# Patient Record
Sex: FEMALE | Race: WHITE | NOT HISPANIC OR LATINO | Employment: FULL TIME | URBAN - METROPOLITAN AREA
[De-identification: names, ages, dates, MRNs, and addresses within clinical notes are randomized per-mention and may not be internally consistent; named-entity substitution may affect disease eponyms.]

---

## 2017-02-22 ENCOUNTER — APPOINTMENT (OUTPATIENT)
Dept: LAB | Age: 57
End: 2017-02-22
Payer: COMMERCIAL

## 2017-02-22 ENCOUNTER — TRANSCRIBE ORDERS (OUTPATIENT)
Dept: ADMINISTRATIVE | Age: 57
End: 2017-02-22

## 2017-02-22 DIAGNOSIS — Z79.899 ENCOUNTER FOR LONG-TERM (CURRENT) USE OF OTHER MEDICATIONS: Primary | ICD-10-CM

## 2017-02-22 DIAGNOSIS — E55.9 VITAMIN D DEFICIENCY: ICD-10-CM

## 2017-02-22 DIAGNOSIS — Z79.899 ENCOUNTER FOR LONG-TERM (CURRENT) USE OF OTHER MEDICATIONS: ICD-10-CM

## 2017-02-22 DIAGNOSIS — L71.8 OTHER ROSACEA: ICD-10-CM

## 2017-02-22 DIAGNOSIS — R53.83 OTHER FATIGUE: ICD-10-CM

## 2017-02-22 LAB
ALBUMIN SERPL BCP-MCNC: 3.9 G/DL (ref 3.5–5)
ALP SERPL-CCNC: 82 U/L (ref 46–116)
ALT SERPL W P-5'-P-CCNC: 23 U/L (ref 12–78)
ANION GAP SERPL CALCULATED.3IONS-SCNC: 6 MMOL/L (ref 4–13)
AST SERPL W P-5'-P-CCNC: 10 U/L (ref 5–45)
BASOPHILS # BLD AUTO: 0.01 THOUSANDS/ΜL (ref 0–0.1)
BASOPHILS NFR BLD AUTO: 0 % (ref 0–1)
BILIRUB SERPL-MCNC: 0.38 MG/DL (ref 0.2–1)
BUN SERPL-MCNC: 12 MG/DL (ref 5–25)
CALCIUM SERPL-MCNC: 9.4 MG/DL (ref 8.3–10.1)
CHLORIDE SERPL-SCNC: 103 MMOL/L (ref 100–108)
CO2 SERPL-SCNC: 30 MMOL/L (ref 21–32)
CREAT SERPL-MCNC: 0.84 MG/DL (ref 0.6–1.3)
EOSINOPHIL # BLD AUTO: 0.08 THOUSAND/ΜL (ref 0–0.61)
EOSINOPHIL NFR BLD AUTO: 1 % (ref 0–6)
ERYTHROCYTE [DISTWIDTH] IN BLOOD BY AUTOMATED COUNT: 13.5 % (ref 11.6–15.1)
GFR SERPL CREATININE-BSD FRML MDRD: >60 ML/MIN/1.73SQ M
GLUCOSE SERPL-MCNC: 51 MG/DL (ref 65–140)
HCT VFR BLD AUTO: 41.3 % (ref 34.8–46.1)
HGB BLD-MCNC: 13.8 G/DL (ref 11.5–15.4)
LYMPHOCYTES # BLD AUTO: 3 THOUSANDS/ΜL (ref 0.6–4.47)
LYMPHOCYTES NFR BLD AUTO: 48 % (ref 14–44)
MCH RBC QN AUTO: 30.3 PG (ref 26.8–34.3)
MCHC RBC AUTO-ENTMCNC: 33.4 G/DL (ref 31.4–37.4)
MCV RBC AUTO: 91 FL (ref 82–98)
MONOCYTES # BLD AUTO: 0.58 THOUSAND/ΜL (ref 0.17–1.22)
MONOCYTES NFR BLD AUTO: 9 % (ref 4–12)
NEUTROPHILS # BLD AUTO: 2.68 THOUSANDS/ΜL (ref 1.85–7.62)
NEUTS SEG NFR BLD AUTO: 42 % (ref 43–75)
NRBC BLD AUTO-RTO: 0 /100 WBCS
PLATELET # BLD AUTO: 277 THOUSANDS/UL (ref 149–390)
PMV BLD AUTO: 11.6 FL (ref 8.9–12.7)
POTASSIUM SERPL-SCNC: 4 MMOL/L (ref 3.5–5.3)
PROT SERPL-MCNC: 8 G/DL (ref 6.4–8.2)
RBC # BLD AUTO: 4.55 MILLION/UL (ref 3.81–5.12)
SODIUM SERPL-SCNC: 139 MMOL/L (ref 136–145)
WBC # BLD AUTO: 6.36 THOUSAND/UL (ref 4.31–10.16)

## 2017-02-22 PROCEDURE — 36415 COLL VENOUS BLD VENIPUNCTURE: CPT

## 2017-02-22 PROCEDURE — 80053 COMPREHEN METABOLIC PANEL: CPT

## 2017-02-22 PROCEDURE — 85025 COMPLETE CBC W/AUTO DIFF WBC: CPT

## 2017-03-07 ENCOUNTER — ALLSCRIPTS OFFICE VISIT (OUTPATIENT)
Dept: OTHER | Facility: OTHER | Age: 57
End: 2017-03-07

## 2017-03-08 ENCOUNTER — TRANSCRIBE ORDERS (OUTPATIENT)
Dept: ADMINISTRATIVE | Age: 57
End: 2017-03-08

## 2017-03-08 ENCOUNTER — APPOINTMENT (OUTPATIENT)
Dept: LAB | Age: 57
End: 2017-03-08
Payer: COMMERCIAL

## 2017-03-08 DIAGNOSIS — R73.9 HYPERGLYCEMIA: ICD-10-CM

## 2017-03-08 LAB
BASOPHILS # BLD AUTO: 0.02 THOUSANDS/ΜL (ref 0–0.1)
BASOPHILS NFR BLD AUTO: 0 % (ref 0–1)
EOSINOPHIL # BLD AUTO: 0.09 THOUSAND/ΜL (ref 0–0.61)
EOSINOPHIL NFR BLD AUTO: 2 % (ref 0–6)
ERYTHROCYTE [DISTWIDTH] IN BLOOD BY AUTOMATED COUNT: 13.5 % (ref 11.6–15.1)
GLUCOSE P FAST SERPL-MCNC: 77 MG/DL (ref 65–99)
HCT VFR BLD AUTO: 40.5 % (ref 34.8–46.1)
HGB BLD-MCNC: 13.4 G/DL (ref 11.5–15.4)
INSULIN SERPL-ACNC: 9.5 MU/L (ref 3–25)
LYMPHOCYTES # BLD AUTO: 2.22 THOUSANDS/ΜL (ref 0.6–4.47)
LYMPHOCYTES NFR BLD AUTO: 37 % (ref 14–44)
MCH RBC QN AUTO: 30 PG (ref 26.8–34.3)
MCHC RBC AUTO-ENTMCNC: 33.1 G/DL (ref 31.4–37.4)
MCV RBC AUTO: 91 FL (ref 82–98)
MONOCYTES # BLD AUTO: 0.81 THOUSAND/ΜL (ref 0.17–1.22)
MONOCYTES NFR BLD AUTO: 13 % (ref 4–12)
NEUTROPHILS # BLD AUTO: 2.93 THOUSANDS/ΜL (ref 1.85–7.62)
NEUTS SEG NFR BLD AUTO: 48 % (ref 43–75)
NRBC BLD AUTO-RTO: 0 /100 WBCS
PLATELET # BLD AUTO: 237 THOUSANDS/UL (ref 149–390)
PMV BLD AUTO: 11.7 FL (ref 8.9–12.7)
RBC # BLD AUTO: 4.47 MILLION/UL (ref 3.81–5.12)
WBC # BLD AUTO: 6.07 THOUSAND/UL (ref 4.31–10.16)

## 2017-03-08 PROCEDURE — 85025 COMPLETE CBC W/AUTO DIFF WBC: CPT

## 2017-03-08 PROCEDURE — 82947 ASSAY GLUCOSE BLOOD QUANT: CPT

## 2017-03-08 PROCEDURE — 36415 COLL VENOUS BLD VENIPUNCTURE: CPT

## 2017-03-08 PROCEDURE — 83525 ASSAY OF INSULIN: CPT

## 2017-03-16 ENCOUNTER — HOSPITAL ENCOUNTER (OUTPATIENT)
Dept: NON INVASIVE DIAGNOSTICS | Facility: CLINIC | Age: 57
Discharge: HOME | End: 2017-03-16
Payer: COMMERCIAL

## 2017-03-16 DIAGNOSIS — R07.9 CHEST PAIN: ICD-10-CM

## 2017-03-16 LAB
CHEST PAIN STATEMENT: NORMAL
MAX DIASTOLIC BP: 68 MMHG
MAX HEART RATE: 164 BPM
MAX PREDICTED HEART RATE: 162 BPM
MAX. SYSTOLIC BP: 152 MMHG
PROTOCOL NAME: NORMAL
REASON FOR TERMINATION: NORMAL
TARGET HR FORMULA: NORMAL
TEST INDICATION: NORMAL
TIME IN EXERCISE PHASE: 570 S

## 2017-03-16 PROCEDURE — 93017 CV STRESS TEST TRACING ONLY: CPT

## 2017-05-22 ENCOUNTER — ALLSCRIPTS OFFICE VISIT (OUTPATIENT)
Dept: OTHER | Facility: OTHER | Age: 57
End: 2017-05-22

## 2017-05-23 ENCOUNTER — GENERIC CONVERSION - ENCOUNTER (OUTPATIENT)
Dept: OTHER | Facility: OTHER | Age: 57
End: 2017-05-23

## 2017-05-30 ENCOUNTER — GENERIC CONVERSION - ENCOUNTER (OUTPATIENT)
Dept: OTHER | Facility: OTHER | Age: 57
End: 2017-05-30

## 2017-06-23 ENCOUNTER — GENERIC CONVERSION - ENCOUNTER (OUTPATIENT)
Dept: OTHER | Facility: OTHER | Age: 57
End: 2017-06-23

## 2017-06-30 ENCOUNTER — GENERIC CONVERSION - ENCOUNTER (OUTPATIENT)
Dept: OTHER | Facility: OTHER | Age: 57
End: 2017-06-30

## 2017-06-30 ENCOUNTER — APPOINTMENT (OUTPATIENT)
Dept: LAB | Age: 57
End: 2017-06-30
Payer: COMMERCIAL

## 2017-06-30 ENCOUNTER — TRANSCRIBE ORDERS (OUTPATIENT)
Dept: ADMINISTRATIVE | Age: 57
End: 2017-06-30

## 2017-06-30 DIAGNOSIS — Z00.8 HEALTH EXAMINATION IN POPULATION SURVEY: ICD-10-CM

## 2017-06-30 DIAGNOSIS — Z00.8 HEALTH EXAMINATION IN POPULATION SURVEY: Primary | ICD-10-CM

## 2017-06-30 LAB
CHOLEST SERPL-MCNC: 161 MG/DL (ref 50–200)
EST. AVERAGE GLUCOSE BLD GHB EST-MCNC: 108 MG/DL
HBA1C MFR BLD: 5.4 % (ref 4.2–6.3)
HDLC SERPL-MCNC: 65 MG/DL (ref 40–60)
LDLC SERPL CALC-MCNC: 83 MG/DL (ref 0–100)
TRIGL SERPL-MCNC: 64 MG/DL

## 2017-06-30 PROCEDURE — 36415 COLL VENOUS BLD VENIPUNCTURE: CPT

## 2017-06-30 PROCEDURE — 83036 HEMOGLOBIN GLYCOSYLATED A1C: CPT

## 2017-06-30 PROCEDURE — 80061 LIPID PANEL: CPT

## 2017-07-14 RX ORDER — CHOLECALCIFEROL (VITAMIN D3) 125 MCG
CAPSULE ORAL DAILY
COMMUNITY
End: 2018-04-24 | Stop reason: SDUPTHER

## 2017-07-14 RX ORDER — SPIRONOLACTONE 50 MG/1
TABLET, FILM COATED ORAL
COMMUNITY
End: 2018-10-31 | Stop reason: SDUPTHER

## 2017-07-16 ENCOUNTER — ANESTHESIA EVENT (OUTPATIENT)
Dept: GASTROENTEROLOGY | Facility: HOSPITAL | Age: 57
End: 2017-07-16
Payer: COMMERCIAL

## 2017-07-17 ENCOUNTER — HOSPITAL ENCOUNTER (OUTPATIENT)
Facility: HOSPITAL | Age: 57
Setting detail: OUTPATIENT SURGERY
Discharge: HOME | End: 2017-07-17
Attending: COLON & RECTAL SURGERY | Admitting: COLON & RECTAL SURGERY
Payer: COMMERCIAL

## 2017-07-17 ENCOUNTER — GENERIC CONVERSION - ENCOUNTER (OUTPATIENT)
Dept: OTHER | Facility: OTHER | Age: 57
End: 2017-07-17

## 2017-07-17 ENCOUNTER — ANESTHESIA (OUTPATIENT)
Dept: GASTROENTEROLOGY | Facility: HOSPITAL | Age: 57
End: 2017-07-17
Payer: COMMERCIAL

## 2017-07-17 VITALS
TEMPERATURE: 97.1 F | WEIGHT: 145 LBS | BODY MASS INDEX: 24.75 KG/M2 | RESPIRATION RATE: 16 BRPM | DIASTOLIC BLOOD PRESSURE: 65 MMHG | HEIGHT: 64 IN | SYSTOLIC BLOOD PRESSURE: 93 MMHG | OXYGEN SATURATION: 100 % | HEART RATE: 70 BPM

## 2017-07-17 DIAGNOSIS — Z83.79 FAMILY HISTORY OF OTHER DISEASES OF THE DIGESTIVE SYSTEM: ICD-10-CM

## 2017-07-17 DIAGNOSIS — Z12.11 ENCOUNTER FOR SCREENING FOR MALIGNANT NEOPLASM OF COLON: ICD-10-CM

## 2017-07-17 PROCEDURE — 88305 TISSUE EXAM BY PATHOLOGIST: CPT | Performed by: COLON & RECTAL SURGERY

## 2017-07-17 RX ORDER — PROPOFOL 10 MG/ML
INJECTION, EMULSION INTRAVENOUS CONTINUOUS PRN
Status: DISCONTINUED | OUTPATIENT
Start: 2017-07-17 | End: 2017-07-17 | Stop reason: SURG

## 2017-07-17 RX ORDER — SODIUM CHLORIDE 9 MG/ML
100 INJECTION, SOLUTION INTRAVENOUS CONTINUOUS
Status: DISCONTINUED | OUTPATIENT
Start: 2017-07-17 | End: 2017-07-17 | Stop reason: HOSPADM

## 2017-07-17 RX ORDER — PROPOFOL 10 MG/ML
INJECTION, EMULSION INTRAVENOUS AS NEEDED
Status: DISCONTINUED | OUTPATIENT
Start: 2017-07-17 | End: 2017-07-17 | Stop reason: SURG

## 2017-07-17 RX ADMIN — PROPOFOL 110 MCG/KG/MIN: 10 INJECTION, EMULSION INTRAVENOUS at 07:44

## 2017-07-17 RX ADMIN — SODIUM CHLORIDE 100 ML/HR: 0.9 INJECTION, SOLUTION INTRAVENOUS at 07:30

## 2017-07-17 RX ADMIN — PROPOFOL 90 MG: 10 INJECTION, EMULSION INTRAVENOUS at 07:44

## 2017-07-31 ENCOUNTER — ALLSCRIPTS OFFICE VISIT (OUTPATIENT)
Dept: OTHER | Facility: OTHER | Age: 57
End: 2017-07-31

## 2017-07-31 DIAGNOSIS — Z12.31 ENCOUNTER FOR SCREENING MAMMOGRAM FOR MALIGNANT NEOPLASM OF BREAST: ICD-10-CM

## 2017-07-31 PROCEDURE — G0145 SCR C/V CYTO,THINLAYER,RESCR: HCPCS | Performed by: OBSTETRICS & GYNECOLOGY

## 2017-08-02 ENCOUNTER — TRANSCRIBE ORDERS (OUTPATIENT)
Dept: ADMINISTRATIVE | Age: 57
End: 2017-08-02

## 2017-08-02 DIAGNOSIS — E78.5 OTHER AND UNSPECIFIED HYPERLIPIDEMIA: Primary | ICD-10-CM

## 2017-08-03 ENCOUNTER — LAB REQUISITION (OUTPATIENT)
Dept: LAB | Facility: HOSPITAL | Age: 57
End: 2017-08-03
Payer: COMMERCIAL

## 2017-08-03 DIAGNOSIS — Z01.419 ENCOUNTER FOR GYNECOLOGICAL EXAMINATION WITHOUT ABNORMAL FINDING: ICD-10-CM

## 2017-08-08 LAB
LAB AP GYN PRIMARY INTERPRETATION: NORMAL
Lab: NORMAL

## 2017-09-19 ENCOUNTER — GENERIC CONVERSION - ENCOUNTER (OUTPATIENT)
Dept: OTHER | Facility: OTHER | Age: 57
End: 2017-09-19

## 2017-10-16 ENCOUNTER — TRANSCRIBE ORDERS (OUTPATIENT)
Dept: ADMINISTRATIVE | Age: 57
End: 2017-10-16

## 2017-10-16 DIAGNOSIS — Z12.31 ENCOUNTER FOR SCREENING MAMMOGRAM FOR MALIGNANT NEOPLASM OF BREAST: ICD-10-CM

## 2017-10-16 DIAGNOSIS — E78.5 HYPERLIPIDEMIA: ICD-10-CM

## 2017-10-16 DIAGNOSIS — M85.80 OTHER SPECIFIED DISORDERS OF BONE DENSITY AND STRUCTURE, UNSPECIFIED SITE (CODE): ICD-10-CM

## 2017-10-16 DIAGNOSIS — E55.9 VITAMIN D DEFICIENCY: ICD-10-CM

## 2017-10-16 DIAGNOSIS — R92.2 INCONCLUSIVE MAMMOGRAM: ICD-10-CM

## 2017-10-16 DIAGNOSIS — M89.9 DISORDER OF BONE: ICD-10-CM

## 2017-10-20 ENCOUNTER — APPOINTMENT (OUTPATIENT)
Dept: LAB | Age: 57
End: 2017-10-20
Payer: COMMERCIAL

## 2017-10-20 ENCOUNTER — TRANSCRIBE ORDERS (OUTPATIENT)
Dept: ADMINISTRATIVE | Age: 57
End: 2017-10-20

## 2017-10-20 DIAGNOSIS — E55.9 VITAMIN D DEFICIENCY: ICD-10-CM

## 2017-10-20 DIAGNOSIS — E78.5 HYPERLIPIDEMIA: ICD-10-CM

## 2017-10-20 LAB
25(OH)D3 SERPL-MCNC: 38.3 NG/ML (ref 30–100)
ALBUMIN SERPL BCP-MCNC: 3.8 G/DL (ref 3.5–5)
ALP SERPL-CCNC: 91 U/L (ref 46–116)
ALT SERPL W P-5'-P-CCNC: 19 U/L (ref 12–78)
ANION GAP SERPL CALCULATED.3IONS-SCNC: 3 MMOL/L (ref 4–13)
AST SERPL W P-5'-P-CCNC: 13 U/L (ref 5–45)
BASOPHILS # BLD AUTO: 0.03 THOUSANDS/ΜL (ref 0–0.1)
BASOPHILS NFR BLD AUTO: 1 % (ref 0–1)
BILIRUB SERPL-MCNC: 0.5 MG/DL (ref 0.2–1)
BUN SERPL-MCNC: 12 MG/DL (ref 5–25)
CALCIUM SERPL-MCNC: 9.6 MG/DL (ref 8.3–10.1)
CHLORIDE SERPL-SCNC: 102 MMOL/L (ref 100–108)
CO2 SERPL-SCNC: 29 MMOL/L (ref 21–32)
CREAT SERPL-MCNC: 0.76 MG/DL (ref 0.6–1.3)
EOSINOPHIL # BLD AUTO: 0.12 THOUSAND/ΜL (ref 0–0.61)
EOSINOPHIL NFR BLD AUTO: 2 % (ref 0–6)
ERYTHROCYTE [DISTWIDTH] IN BLOOD BY AUTOMATED COUNT: 12.7 % (ref 11.6–15.1)
GFR SERPL CREATININE-BSD FRML MDRD: 87 ML/MIN/1.73SQ M
GLUCOSE P FAST SERPL-MCNC: 76 MG/DL (ref 65–99)
HCT VFR BLD AUTO: 42.1 % (ref 34.8–46.1)
HDLC SERPL-MCNC: 66 MG/DL (ref 40–60)
HGB BLD-MCNC: 14.3 G/DL (ref 11.5–15.4)
LDLC SERPL DIRECT ASSAY-MCNC: 93 MG/DL (ref 0–100)
LYMPHOCYTES # BLD AUTO: 2.05 THOUSANDS/ΜL (ref 0.6–4.47)
LYMPHOCYTES NFR BLD AUTO: 33 % (ref 14–44)
MCH RBC QN AUTO: 31 PG (ref 26.8–34.3)
MCHC RBC AUTO-ENTMCNC: 34 G/DL (ref 31.4–37.4)
MCV RBC AUTO: 91 FL (ref 82–98)
MONOCYTES # BLD AUTO: 0.82 THOUSAND/ΜL (ref 0.17–1.22)
MONOCYTES NFR BLD AUTO: 13 % (ref 4–12)
NEUTROPHILS # BLD AUTO: 3.09 THOUSANDS/ΜL (ref 1.85–7.62)
NEUTS SEG NFR BLD AUTO: 51 % (ref 43–75)
NRBC BLD AUTO-RTO: 0 /100 WBCS
PLATELET # BLD AUTO: 298 THOUSANDS/UL (ref 149–390)
PMV BLD AUTO: 11.3 FL (ref 8.9–12.7)
POTASSIUM SERPL-SCNC: 3.9 MMOL/L (ref 3.5–5.3)
PROT SERPL-MCNC: 8 G/DL (ref 6.4–8.2)
RBC # BLD AUTO: 4.61 MILLION/UL (ref 3.81–5.12)
SODIUM SERPL-SCNC: 134 MMOL/L (ref 136–145)
TRIGL SERPL-MCNC: 96 MG/DL
WBC # BLD AUTO: 6.13 THOUSAND/UL (ref 4.31–10.16)

## 2017-10-20 PROCEDURE — 80053 COMPREHEN METABOLIC PANEL: CPT

## 2017-10-20 PROCEDURE — 83718 ASSAY OF LIPOPROTEIN: CPT

## 2017-10-20 PROCEDURE — 83721 ASSAY OF BLOOD LIPOPROTEIN: CPT

## 2017-10-20 PROCEDURE — 85025 COMPLETE CBC W/AUTO DIFF WBC: CPT

## 2017-10-20 PROCEDURE — 36415 COLL VENOUS BLD VENIPUNCTURE: CPT

## 2017-10-20 PROCEDURE — 82306 VITAMIN D 25 HYDROXY: CPT

## 2017-10-20 PROCEDURE — 84478 ASSAY OF TRIGLYCERIDES: CPT

## 2017-10-23 ENCOUNTER — GENERIC CONVERSION - ENCOUNTER (OUTPATIENT)
Dept: OTHER | Facility: OTHER | Age: 57
End: 2017-10-23

## 2017-10-23 ENCOUNTER — GENERIC CONVERSION - ENCOUNTER (OUTPATIENT)
Dept: INTERNAL MEDICINE CLINIC | Facility: CLINIC | Age: 57
End: 2017-10-23

## 2018-01-13 VITALS
RESPIRATION RATE: 14 BRPM | WEIGHT: 143 LBS | BODY MASS INDEX: 23.82 KG/M2 | SYSTOLIC BLOOD PRESSURE: 118 MMHG | HEART RATE: 68 BPM | DIASTOLIC BLOOD PRESSURE: 70 MMHG | HEIGHT: 65 IN

## 2018-01-13 VITALS
HEIGHT: 65 IN | DIASTOLIC BLOOD PRESSURE: 76 MMHG | BODY MASS INDEX: 23.41 KG/M2 | SYSTOLIC BLOOD PRESSURE: 120 MMHG | WEIGHT: 140.5 LBS

## 2018-01-14 NOTE — MISCELLANEOUS
Message  Spoke with patient regarding genetic testing  Negative for any mutations  Patient flagged for increased colorectal screening secondary to family history  Suggest colonoscopy screening every 5 years  Patient to discuss with her provider, and to ask at what age her children should start screening  Folder with results to be mailed to patient        Signatures   Electronically signed by : Raghav David, [facility]; Jun 16 2016  2:18PM EST                       (Author)

## 2018-01-15 VITALS
TEMPERATURE: 98.4 F | HEART RATE: 68 BPM | BODY MASS INDEX: 23.89 KG/M2 | WEIGHT: 143.38 LBS | HEIGHT: 65 IN | RESPIRATION RATE: 14 BRPM | SYSTOLIC BLOOD PRESSURE: 116 MMHG | DIASTOLIC BLOOD PRESSURE: 72 MMHG

## 2018-01-22 VITALS — BODY MASS INDEX: 24.18 KG/M2 | WEIGHT: 145.13 LBS | HEIGHT: 65 IN

## 2018-01-22 VITALS — RESPIRATION RATE: 14 BRPM | HEART RATE: 68 BPM

## 2018-01-30 ENCOUNTER — TRANSCRIBE ORDERS (OUTPATIENT)
Dept: ADMINISTRATIVE | Age: 58
End: 2018-01-30

## 2018-01-31 ENCOUNTER — HOSPITAL ENCOUNTER (OUTPATIENT)
Dept: RADIOLOGY | Age: 58
Discharge: HOME | End: 2018-01-31
Payer: COMMERCIAL

## 2018-01-31 DIAGNOSIS — R92.2 INCONCLUSIVE MAMMOGRAM: ICD-10-CM

## 2018-01-31 DIAGNOSIS — Z12.31 ENCOUNTER FOR SCREENING MAMMOGRAM FOR MALIGNANT NEOPLASM OF BREAST: ICD-10-CM

## 2018-01-31 PROCEDURE — 77063 BREAST TOMOSYNTHESIS BI: CPT

## 2018-01-31 PROCEDURE — 77067 SCR MAMMO BI INCL CAD: CPT

## 2018-04-01 DIAGNOSIS — E78.5 HYPERLIPIDEMIA: ICD-10-CM

## 2018-04-01 DIAGNOSIS — R07.9 CHEST PAIN: ICD-10-CM

## 2018-04-12 ENCOUNTER — APPOINTMENT (OUTPATIENT)
Dept: LAB | Facility: MEDICAL CENTER | Age: 58
End: 2018-04-12
Payer: COMMERCIAL

## 2018-04-12 DIAGNOSIS — R07.9 CHEST PAIN: ICD-10-CM

## 2018-04-12 DIAGNOSIS — E78.5 HYPERLIPIDEMIA: ICD-10-CM

## 2018-04-12 LAB
ALBUMIN SERPL BCP-MCNC: 3.9 G/DL (ref 3.5–5)
ALP SERPL-CCNC: 88 U/L (ref 46–116)
ALT SERPL W P-5'-P-CCNC: 20 U/L (ref 12–78)
ANION GAP SERPL CALCULATED.3IONS-SCNC: 5 MMOL/L (ref 4–13)
AST SERPL W P-5'-P-CCNC: 16 U/L (ref 5–45)
BASOPHILS # BLD AUTO: 0.03 THOUSANDS/ΜL (ref 0–0.1)
BASOPHILS NFR BLD AUTO: 1 % (ref 0–1)
BILIRUB SERPL-MCNC: 0.3 MG/DL (ref 0.2–1)
BUN SERPL-MCNC: 16 MG/DL (ref 5–25)
CALCIUM SERPL-MCNC: 9.6 MG/DL
CHLORIDE SERPL-SCNC: 106 MMOL/L (ref 100–108)
CO2 SERPL-SCNC: 27 MMOL/L (ref 21–32)
CREAT SERPL-MCNC: 0.83 MG/DL (ref 0.6–1.3)
EOSINOPHIL # BLD AUTO: 0.13 THOUSAND/ΜL (ref 0–0.61)
EOSINOPHIL NFR BLD AUTO: 2 % (ref 0–6)
ERYTHROCYTE [DISTWIDTH] IN BLOOD BY AUTOMATED COUNT: 12.5 % (ref 11.6–15.1)
GFR SERPL CREATININE-BSD FRML MDRD: 77 ML/MIN/1.73SQ M
GLUCOSE SERPL-MCNC: 62 MG/DL (ref 65–140)
HCT VFR BLD AUTO: 45.1 % (ref 34.8–46.1)
HCV AB SER QL: NORMAL
HGB BLD-MCNC: 14.5 G/DL (ref 11.5–15.4)
LYMPHOCYTES # BLD AUTO: 1.9 THOUSANDS/ΜL (ref 0.6–4.47)
LYMPHOCYTES NFR BLD AUTO: 35 % (ref 14–44)
MCH RBC QN AUTO: 30.3 PG (ref 26.8–34.3)
MCHC RBC AUTO-ENTMCNC: 32.2 G/DL (ref 31.4–37.4)
MCV RBC AUTO: 94 FL (ref 82–98)
MONOCYTES # BLD AUTO: 0.65 THOUSAND/ΜL (ref 0.17–1.22)
MONOCYTES NFR BLD AUTO: 12 % (ref 4–12)
NEUTROPHILS # BLD AUTO: 2.78 THOUSANDS/ΜL (ref 1.85–7.62)
NEUTS SEG NFR BLD AUTO: 50 % (ref 43–75)
NRBC BLD AUTO-RTO: 0 /100 WBCS
PLATELET # BLD AUTO: 267 THOUSANDS/UL (ref 149–390)
PMV BLD AUTO: 11.5 FL (ref 8.9–12.7)
POTASSIUM SERPL-SCNC: 4.5 MMOL/L (ref 3.5–5.3)
PROT SERPL-MCNC: 7.9 G/DL (ref 6.4–8.2)
RBC # BLD AUTO: 4.79 MILLION/UL (ref 3.81–5.12)
SODIUM SERPL-SCNC: 138 MMOL/L (ref 136–145)
WBC # BLD AUTO: 5.5 THOUSAND/UL (ref 4.31–10.16)

## 2018-04-12 PROCEDURE — 85025 COMPLETE CBC W/AUTO DIFF WBC: CPT

## 2018-04-12 PROCEDURE — 36415 COLL VENOUS BLD VENIPUNCTURE: CPT

## 2018-04-12 PROCEDURE — 80053 COMPREHEN METABOLIC PANEL: CPT

## 2018-04-12 PROCEDURE — 86803 HEPATITIS C AB TEST: CPT

## 2018-04-22 PROBLEM — M85.80 OSTEOPENIA: Status: ACTIVE | Noted: 2017-10-23

## 2018-04-22 PROBLEM — H33.20 RETINAL DETACHMENT: Status: ACTIVE | Noted: 2017-10-23

## 2018-04-22 PROBLEM — R73.9 HYPERGLYCEMIA: Status: ACTIVE | Noted: 2017-03-07

## 2018-04-22 PROBLEM — M85.80 OSTEOPENIA: Chronic | Status: ACTIVE | Noted: 2017-10-23

## 2018-04-22 PROBLEM — H33.20 RETINAL DETACHMENT: Chronic | Status: ACTIVE | Noted: 2017-10-23

## 2018-04-22 PROBLEM — K63.5 COLON POLYPS: Status: ACTIVE | Noted: 2017-10-23

## 2018-04-22 PROBLEM — K63.5 COLON POLYPS: Chronic | Status: ACTIVE | Noted: 2017-10-23

## 2018-04-23 RX ORDER — NITROFURANTOIN 25; 75 MG/1; MG/1
1 CAPSULE ORAL EVERY 12 HOURS
COMMUNITY
Start: 2017-10-14 | End: 2018-04-24 | Stop reason: SDUPTHER

## 2018-04-23 RX ORDER — FLUTICASONE PROPIONATE 50 MCG
SPRAY, SUSPENSION (ML) NASAL
COMMUNITY
End: 2018-10-31 | Stop reason: SDUPTHER

## 2018-04-23 RX ORDER — ERGOCALCIFEROL 1.25 MG/1
CAPSULE ORAL
COMMUNITY
Start: 2017-05-09 | End: 2018-10-31 | Stop reason: SDUPTHER

## 2018-04-24 ENCOUNTER — OFFICE VISIT (OUTPATIENT)
Dept: INTERNAL MEDICINE CLINIC | Facility: CLINIC | Age: 58
End: 2018-04-24
Payer: COMMERCIAL

## 2018-04-24 VITALS
DIASTOLIC BLOOD PRESSURE: 72 MMHG | SYSTOLIC BLOOD PRESSURE: 110 MMHG | WEIGHT: 144.6 LBS | HEART RATE: 78 BPM | HEIGHT: 64 IN | BODY MASS INDEX: 24.69 KG/M2 | RESPIRATION RATE: 14 BRPM

## 2018-04-24 DIAGNOSIS — F41.9 ANXIETY: ICD-10-CM

## 2018-04-24 DIAGNOSIS — R07.9 CHEST PAIN, UNSPECIFIED TYPE: ICD-10-CM

## 2018-04-24 DIAGNOSIS — E55.9 VITAMIN D DEFICIENCY: ICD-10-CM

## 2018-04-24 DIAGNOSIS — J45.20 INTERMITTENT ASTHMA WITHOUT COMPLICATION, UNSPECIFIED ASTHMA SEVERITY: Chronic | ICD-10-CM

## 2018-04-24 DIAGNOSIS — E78.5 HYPERLIPIDEMIA, UNSPECIFIED HYPERLIPIDEMIA TYPE: Primary | Chronic | ICD-10-CM

## 2018-04-24 PROCEDURE — 99214 OFFICE O/P EST MOD 30 MIN: CPT | Performed by: INTERNAL MEDICINE

## 2018-04-24 PROCEDURE — 3008F BODY MASS INDEX DOCD: CPT | Performed by: INTERNAL MEDICINE

## 2018-04-24 RX ORDER — DOXYCYCLINE HYCLATE 50 MG/1
CAPSULE ORAL AS NEEDED
COMMUNITY
Start: 2018-04-06 | End: 2018-10-31 | Stop reason: SDUPTHER

## 2018-04-24 RX ORDER — CLINDAMYCIN PHOSPHATE 10 MG/ML
SOLUTION TOPICAL
COMMUNITY
Start: 2018-03-09 | End: 2018-10-31 | Stop reason: SDUPTHER

## 2018-04-24 NOTE — PROGRESS NOTES
Assessment/Plan:  1  Rosacea-treatment as per Dermatology-currently on oral doxycycline and topical clindamycin  2  Family history cancer of the colon with personal history of polyps  Colonoscopy in July 2017 showed polyps  Recommended repeat in 5 years which is in the summer 2022  3  Retinal tear-continues to recover from this  4  Dense breast-family history of breast CA as well  As noted subsequent mammograms are being done with both 2D and 3D studies  5  Prior concerned about possible hypoglycemia  Simultaneous sugar 77 with fasting insulin normal   Previously she had identified sugar 51 without definite associated symptoms-no longer an issue  6  Vitamin-D deficiency-remains on over-the-counter as well as prescription vitamin-D  7   Epistaxis-previously attributed to Claritin-D  Not an issue at present uses saline nasal spray  8  Strong family history of breast cancer-mother premenopausal breast CA  Grandmother and had premenopausal breast CA  No family history of ovarian  Also has family history cancer of the colon  Had been previously recommended the patient she had periodic MRI of the breast as well as genetic testing  She had genetic testing was told results were negative  9  Anxiety improved-monitor  10  Atypical chest pain-may represent costochondritis  Has not recurredoto  11  Dizziness-genic-saw Dr Nuno Lawler spent in the past   At 1 point was being considered for possible Meniere's disease  12  Acne-had seen Dermatology  Remains on spironolactone  Prior androgen levels including testosterone as well as DHEA normal  13  Recurrent UTI-had seen Dr [place]  Cystoscopy showed inflammatory bladder neck polyps with squamous metaplasia and Tricor and I this  IVP the remote past normal   Previously was on Macrodantin suppression    Asymptomatic times several years    All other problems as per note of September 2011      MEDICAL REGIMEN:      Spironolactone 50 milligrams -2 p o  b i d , multivitamin, vitamin D3/2000 units a day, vitamin-D 96816 units monthly    Appointment in several months with prior chemistry profile, cholesterol profile, vitamin-D level  No problem-specific Assessment & Plan notes found for this encounter  Diagnoses and all orders for this visit:    Hyperlipidemia, unspecified hyperlipidemia type    Chest pain, unspecified type    Anxiety    Intermittent asthma without complication, unspecified asthma severity    Vitamin D deficiency    Other orders  -     DAILY MULTIPLE VITAMINS PO; Take by mouth  -     fluticasone (FLONASE) 50 mcg/act nasal spray; into each nostril  -     Discontinue: nitrofurantoin (MACROBID) 100 mg capsule; Take 1 capsule by mouth every 12 (twelve) hours  -     ergocalciferol (VITAMIN D2) 50,000 units; Take by mouth  -     doxycycline hyclate (VIBRAMYCIN) 50 mg capsule;   -     clindamycin (CLEOCIN T) 1 %; Subjective:      Patient ID: Lisa Koo is a 60 y o  female  Reviewed several issues  As noted she is an extremely strong family history of breast cancer-perhaps on identified familial breast cancer syndrome  She also has dense breasts  We reviewed the literature showing higher incidence of breast cancer in patients with dense breasts  She needs 2D as well as 3D studies when she has her mammogram   Most recent mammogram was done in January 2018 was a 2D with 3D study  Exam today shows no evidence of any masses  She had traveled to [place]  She returned and had facial rash  She saw Dermatology who told her this was a rosacea varying  She is currently on doxycycline 50 milligrams daily for month with clindamycin topical b i d  She says her face is much improved  This had not happened to her previously  This patient denies any systemic symptoms  Specifically there has been no evidence of fever, night sweats, significant weight loss or significant decrease in appetite            As noted she had a prior retinal tear   Treatment as per Ophthalmology  She is recovering from this  As a follow-up visit with Ophthalmology scheduled  Laboratory testing done prior to this visit shows normal CBC and chemistry profile  As noted she remains on spironolactone currently on 200 milligrams a day using 50 milligram dosing  She has not had any recurrence the atypical chest pain  She has an active individual             The following portions of the patient's history were reviewed and updated as appropriate: current medications, past family history, past medical history, past social history, past surgical history and problem list     Review of Systems   Constitutional: Negative  Respiratory: Negative  Cardiovascular: Negative  Gastrointestinal: Negative  Endocrine: Negative  Genitourinary: Negative  Musculoskeletal: Negative  Skin: Positive for rash  Neurological: Negative  Hematological: Negative  Psychiatric/Behavioral: Negative  Objective:      /72   Pulse 78   Resp 14   Ht 5' 4" (1 626 m)   Wt 65 6 kg (144 lb 9 6 oz)   BMI 24 82 kg/m²          Physical Exam   Constitutional: She is oriented to person, place, and time  She appears well-developed and well-nourished  No distress  HENT:   Head: Normocephalic and atraumatic  Right Ear: External ear normal    Left Ear: External ear normal    Nose: Nose normal    Mouth/Throat: Oropharynx is clear and moist  No oropharyngeal exudate  Eyes: Conjunctivae and EOM are normal  Pupils are equal, round, and reactive to light  Right eye exhibits no discharge  Left eye exhibits no discharge  No scleral icterus  Neck: Normal range of motion  Neck supple  No JVD present  No tracheal deviation present  No thyromegaly present  Cardiovascular: Normal rate, regular rhythm and intact distal pulses  Exam reveals no gallop and no friction rub  No murmur heard    Pulmonary/Chest: Effort normal and breath sounds normal  No respiratory distress  She has no wheezes  She has no rales  She exhibits no tenderness  Abdominal: Soft  Bowel sounds are normal  She exhibits no distension and no mass  There is no tenderness  There is no rebound and no guarding  Musculoskeletal: Normal range of motion  She exhibits no edema or deformity  Lymphadenopathy:     She has no cervical adenopathy  Neurological: She is alert and oriented to person, place, and time  She has normal reflexes  No cranial nerve deficit  She exhibits normal muscle tone  Coordination normal    Skin: Skin is warm and dry  No rash noted  No erythema  Psychiatric: She has a normal mood and affect  Her behavior is normal  Judgment and thought content normal    Vitals reviewed

## 2018-06-05 ENCOUNTER — OFFICE VISIT (OUTPATIENT)
Dept: URGENT CARE | Age: 58
End: 2018-06-05
Payer: COMMERCIAL

## 2018-06-05 VITALS
OXYGEN SATURATION: 98 % | BODY MASS INDEX: 24.24 KG/M2 | SYSTOLIC BLOOD PRESSURE: 98 MMHG | WEIGHT: 142 LBS | HEART RATE: 79 BPM | RESPIRATION RATE: 18 BRPM | TEMPERATURE: 97.7 F | HEIGHT: 64 IN | DIASTOLIC BLOOD PRESSURE: 56 MMHG

## 2018-06-05 DIAGNOSIS — L23.7 POISON IVY DERMATITIS: Primary | ICD-10-CM

## 2018-06-05 PROCEDURE — 99213 OFFICE O/P EST LOW 20 MIN: CPT | Performed by: FAMILY MEDICINE

## 2018-06-05 PROCEDURE — S9088 SERVICES PROVIDED IN URGENT: HCPCS | Performed by: FAMILY MEDICINE

## 2018-06-05 RX ORDER — METHYLPREDNISOLONE SODIUM SUCCINATE 125 MG/2ML
125 INJECTION, POWDER, LYOPHILIZED, FOR SOLUTION INTRAMUSCULAR; INTRAVENOUS ONCE
Status: COMPLETED | OUTPATIENT
Start: 2018-06-05 | End: 2018-06-05

## 2018-06-05 RX ORDER — PREDNISONE 10 MG/1
TABLET ORAL
Qty: 21 TABLET | Refills: 0 | Status: SHIPPED | COMMUNITY
Start: 2018-06-05 | End: 2018-10-31 | Stop reason: ALTCHOICE

## 2018-06-05 RX ADMIN — METHYLPREDNISOLONE SODIUM SUCCINATE 125 MG: 125 INJECTION, POWDER, LYOPHILIZED, FOR SOLUTION INTRAMUSCULAR; INTRAVENOUS at 08:29

## 2018-06-05 NOTE — PROGRESS NOTES
[facility]        NAME: Hema Silvestre is a 60 y o  female  : 1960    MRN: [id number]  DATE: 2018  TIME: 8:30 AM    Assessment and Plan   Poison ivy dermatitis [L23 7]  1  Poison ivy dermatitis  methylPREDNISolone sodium succinate (Solu-MEDROL) injection 125 mg    predniSONE 10 mg tablet         Patient Instructions       Take prednisone taper as directed with food  Day 1: take 6  Day 2: take 5  Day 3: take 4  Day 4: take 3  Day 5: take 2  Day 6: take 1  Continue taking benadryl and applying topical benadryl for itching  Wash poison ivy oils off of yourself after being exposed    Chief Complaint     Chief Complaint   Patient presents with    Poison Ivy     x friday         History of Present Illness       69-year-old female presents with poison ivy for 4 days  States that the poison ivy is on her neck, arms, legs  She has taken Benadryl, and apply calamine lotion without relief  Review of Systems   Review of Systems   Constitutional: Negative for chills and fever  Respiratory: Negative for shortness of breath  Skin: Positive for rash  All other systems reviewed and are negative          Current Medications       Current Outpatient Prescriptions:     clindamycin (CLEOCIN T) 1 %, , Disp: , Rfl:     DAILY MULTIPLE VITAMINS PO, Take by mouth, Disp: , Rfl:     doxycycline hyclate (VIBRAMYCIN) 50 mg capsule, , Disp: , Rfl:     ergocalciferol (VITAMIN D2) 50,000 units, Take by mouth, Disp: , Rfl:     fluticasone (FLONASE) 50 mcg/act nasal spray, into each nostril, Disp: , Rfl:     predniSONE 10 mg tablet, Day 1: take 6; day 2: take 5; day 3: take 4; day 4: take 3; day 5: take 2; Day 6: take 1 with food, Disp: 21 tablet, Rfl: 0    spironolactone (ALDACTONE) 25 mg tablet, Take by mouth Take 4 pills twice daily , Disp: , Rfl:     Current Facility-Administered Medications:     methylPREDNISolone sodium succinate (Solu-MEDROL) injection 125 mg, 125 mg, Intramuscular, Once, Dell Salas PA-C, 125 mg at 06/05/18 6798    Current Allergies     Allergies as of 06/05/2018 - Reviewed 06/05/2018   Allergen Reaction Noted    Azithromycin  03/09/2013    Other  08/29/2014    Penicillins Rash 07/14/2017            The following portions of the patient's history were reviewed and updated as appropriate: allergies, current medications, past family history, past medical history, past social history, past surgical history and problem list    Past Medical History:   Diagnosis Date    Abnormal cervical Papanicolaou smear     Acne     LAST ASSESSED: 1/17/15    Asthma     Breast cancer ([address] )     RESOLVED: 1/17/15    Change in bowel habit     Depression     Family hx of colon cancer requiring screening colonoscopy     father colon cancer/ maternal grandfather colon cancer    Long term use of drug     LAST ASSESSED: 10/3/13    Migraine     PONV (postoperative nausea and vomiting)     Retinal tear     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     LAST ASSESSED: 3/9/13    Vascular headache      Past Surgical History:   Procedure Laterality Date    APPENDECTOMY      BREAST LUMPECTOMY      LOCATION    COLONOSCOPY      DILATION AND CURETTAGE OF UTERUS      INCISIONAL BREAST BIOPSY      DE COLONOSCOPY FLX DX W/COLLJ SPEC WHEN PFRMD N/A 7/17/2017    Procedure: COLONOSCOPY;  Surgeon: Luis Armenta MD;  Location: [facility]; Service: Colorectal    RETINOPATHY SURGERY      retinal tear repair x3           Objective   BP 98/56   Pulse 79   Temp 97 7 °F (36 5 °C)   Resp 18   Ht 5' 4" (1 626 m)   Wt 64 4 kg (142 lb)   SpO2 98%   BMI 24 37 kg/m²        Physical Exam     Physical Exam   Constitutional: She is oriented to person, place, and time  She appears well-developed and well-nourished  Neurological: She is alert and oriented to person, place, and time  Skin:        Psychiatric: She has a normal mood and affect  Her behavior is normal    Nursing note and vitals reviewed

## 2018-06-05 NOTE — PATIENT INSTRUCTIONS
Take prednisone taper as directed with food  Day 1: take 6  Day 2: take 5  Day 3: take 4  Day 4: take 3  Day 5: take 2  Day 6: take 1  Continue taking benadryl and applying topical benadryl for itching  Wash poison ivy oils off of yourself after being exposed

## 2018-06-18 ENCOUNTER — OFFICE VISIT (OUTPATIENT)
Dept: INTERNAL MEDICINE CLINIC | Facility: CLINIC | Age: 58
End: 2018-06-18
Payer: COMMERCIAL

## 2018-06-18 ENCOUNTER — TELEPHONE (OUTPATIENT)
Dept: INTERNAL MEDICINE CLINIC | Facility: CLINIC | Age: 58
End: 2018-06-18

## 2018-06-18 DIAGNOSIS — R21 SKIN RASH: Primary | ICD-10-CM

## 2018-06-18 PROCEDURE — 99213 OFFICE O/P EST LOW 20 MIN: CPT | Performed by: INTERNAL MEDICINE

## 2018-06-18 NOTE — TELEPHONE ENCOUNTER
LEFT MESSAGE WITH PHARMACY FOR     betamethasone cream 0 05% APPLY TO AREA BID DISP 45 GRAM    Prednisone 10mg  I po bid x4 days then 1 po daily for 4 days disp 12; r;0    Into [employer]

## 2018-06-18 NOTE — TELEPHONE ENCOUNTER
Patient states she had poison ivy about a week ago on her neck she went to [facility] where they prescribed her prednisone for a week & also gave her the shot  She states now the rash is on her stomach, neck area her right thigh ( upper right leg above the knee to her buttock)    - bumpy  - redness  - she stated they look like welts but its not a welt  Shes been using liquid benadryl  Please advise  Patient does have list of allergies meds

## 2018-06-19 VITALS — RESPIRATION RATE: 14 BRPM | SYSTOLIC BLOOD PRESSURE: 128 MMHG | HEART RATE: 72 BPM | DIASTOLIC BLOOD PRESSURE: 80 MMHG

## 2018-06-19 NOTE — PROGRESS NOTES
Assessment/Plan:  1  Skin rash-suspect this to represents plan poison-she was counseled on this  She will apply calamine lotion b i d  as well as betamethasone cream 0 05% b i d  She will use Zyrtec in the a m  and Benadryl 25 milligrams in the p m  Lauren Hoff Also placed on prednisone 10 milligrams b i d  for 4 days then 10 milligrams daily for 4 days  Further therapy based on overall clinical course  No problem-specific Assessment & Plan notes found for this encounter  Diagnoses and all orders for this visit:    Skin rash          Subjective:      Patient ID: Donny Manning is a 60 y o  female  This patient is seen today as an emergency appointment  She recently developed a skin rash  She went to [facility] and was told that this was plan poison  She was treated with steroid therapy  She has been using antihistamines  She has recurrent rash of the abdomen in leg and is worried about other pathology  Exam shows linear areas of the lower abdomen leg  This still appears to be most consistent with plan poison  Was recommended she be treated with additional oral as well as topical steroids  She has been using Zyrtec will continue this  She will use Zyrtec in a m  as well as Benadryl in the p m     This patient denies any systemic symptoms  Specifically there has been no evidence of fever, night sweats, significant weight loss or significant decrease in appetite  She had been doing weeding in the yard and riding a bike trail   had similar exposure and does not have symptoms        The following portions of the patient's history were reviewed and updated as appropriate: current medications, past family history, past medical history, past social history, past surgical history and problem list     Review of Systems   Constitutional: Negative  Respiratory: Negative  Cardiovascular: Negative  Gastrointestinal: Negative  Endocrine: Negative  Genitourinary: Negative  Musculoskeletal: Negative  Skin: Positive for rash  Neurological: Negative  Hematological: Negative  Psychiatric/Behavioral: Negative  Objective:      /80   Pulse 72   Resp 14          Physical Exam   Constitutional: She is oriented to person, place, and time  She appears well-developed and well-nourished  No distress  HENT:   Head: Normocephalic and atraumatic  Right Ear: External ear normal    Left Ear: External ear normal    Nose: Nose normal    Mouth/Throat: Oropharynx is clear and moist  No oropharyngeal exudate  Eyes: Conjunctivae and EOM are normal  Pupils are equal, round, and reactive to light  Right eye exhibits no discharge  Left eye exhibits no discharge  No scleral icterus  Neck: Normal range of motion  Neck supple  No JVD present  No tracheal deviation present  No thyromegaly present  Cardiovascular: Normal rate, regular rhythm, normal heart sounds and intact distal pulses  Exam reveals no gallop and no friction rub  No murmur heard  Pulmonary/Chest: Effort normal and breath sounds normal  No respiratory distress  She has no wheezes  She has no rales  She exhibits no tenderness  Abdominal: Soft  Bowel sounds are normal  She exhibits no distension and no mass  There is no tenderness  There is no rebound and no guarding  Musculoskeletal: Normal range of motion  She exhibits no edema or deformity  Lymphadenopathy:     She has no cervical adenopathy  Neurological: She is alert and oriented to person, place, and time  She has normal reflexes  No cranial nerve deficit  She exhibits normal muscle tone  Coordination normal    Skin: Skin is warm and dry  No rash noted  No erythema  Linear areas of erythema of the lower abdomen and leg   Psychiatric: She has a normal mood and affect   Her behavior is normal  Judgment and thought content normal

## 2018-06-27 ENCOUNTER — TRANSCRIBE ORDERS (OUTPATIENT)
Dept: ADMINISTRATIVE | Facility: HOSPITAL | Age: 58
End: 2018-06-27

## 2018-06-27 ENCOUNTER — APPOINTMENT (OUTPATIENT)
Dept: LAB | Facility: MEDICAL CENTER | Age: 58
End: 2018-06-27

## 2018-06-27 DIAGNOSIS — Z00.8 HEALTH EXAMINATION IN POPULATION SURVEYS: Primary | ICD-10-CM

## 2018-06-27 DIAGNOSIS — Z00.8 HEALTH EXAMINATION IN POPULATION SURVEYS: ICD-10-CM

## 2018-06-27 LAB
CHOLEST SERPL-MCNC: 151 MG/DL (ref 50–200)
EST. AVERAGE GLUCOSE BLD GHB EST-MCNC: 114 MG/DL
HBA1C MFR BLD: 5.6 % (ref 4.2–6.3)
HDLC SERPL-MCNC: 67 MG/DL (ref 40–60)
LDLC SERPL CALC-MCNC: 66 MG/DL (ref 0–100)
NONHDLC SERPL-MCNC: 84 MG/DL
TRIGL SERPL-MCNC: 89 MG/DL

## 2018-06-27 PROCEDURE — 36415 COLL VENOUS BLD VENIPUNCTURE: CPT | Performed by: PREVENTIVE MEDICINE

## 2018-06-27 PROCEDURE — 83036 HEMOGLOBIN GLYCOSYLATED A1C: CPT | Performed by: PREVENTIVE MEDICINE

## 2018-06-27 PROCEDURE — 80061 LIPID PANEL: CPT

## 2018-10-30 ENCOUNTER — TELEPHONE (OUTPATIENT)
Dept: INTERNAL MEDICINE CLINIC | Facility: CLINIC | Age: 58
End: 2018-10-30

## 2018-10-30 NOTE — TELEPHONE ENCOUNTER
PT SAID THAT SHE DIDN'T GET HER BLOODWORK DONE YET FOR TOMORROW'S APPT  SHE'D LIKE TO KNOW IF SHE SHOULD COME IN STILL OR RESCHEDULE  PLEASE ADVISE    IF RESCHEDULING, WHERE ELSE CAN I PUT HER ON THE SCHEDULE?  SHE WON'T COME IN EARLIER THAN 7:30AM

## 2018-10-30 NOTE — TELEPHONE ENCOUNTER
Have her come in for the appointment - she can then go for blood work and we will call her with results

## 2018-10-31 ENCOUNTER — OFFICE VISIT (OUTPATIENT)
Dept: INTERNAL MEDICINE CLINIC | Facility: CLINIC | Age: 58
End: 2018-10-31
Payer: COMMERCIAL

## 2018-10-31 ENCOUNTER — HOSPITAL ENCOUNTER (OUTPATIENT)
Dept: RADIOLOGY | Facility: HOSPITAL | Age: 58
Discharge: HOME | End: 2018-10-31

## 2018-10-31 ENCOUNTER — APPOINTMENT (OUTPATIENT)
Dept: LAB | Facility: MEDICAL CENTER | Age: 58
End: 2018-10-31
Payer: COMMERCIAL

## 2018-10-31 ENCOUNTER — APPOINTMENT (OUTPATIENT)
Dept: RADIOLOGY | Age: 58
End: 2018-10-31
Payer: COMMERCIAL

## 2018-10-31 VITALS
DIASTOLIC BLOOD PRESSURE: 70 MMHG | SYSTOLIC BLOOD PRESSURE: 120 MMHG | HEART RATE: 68 BPM | BODY MASS INDEX: 24.37 KG/M2 | HEIGHT: 64 IN | RESPIRATION RATE: 14 BRPM

## 2018-10-31 DIAGNOSIS — E78.5 HYPERLIPIDEMIA, UNSPECIFIED HYPERLIPIDEMIA TYPE: Chronic | ICD-10-CM

## 2018-10-31 DIAGNOSIS — E78.5 HYPERLIPIDEMIA, UNSPECIFIED HYPERLIPIDEMIA TYPE: Primary | Chronic | ICD-10-CM

## 2018-10-31 DIAGNOSIS — E55.9 VITAMIN D DEFICIENCY: ICD-10-CM

## 2018-10-31 DIAGNOSIS — Z91.81 STATUS POST FALL: ICD-10-CM

## 2018-10-31 DIAGNOSIS — R92.2 DENSE BREASTS: Chronic | ICD-10-CM

## 2018-10-31 DIAGNOSIS — R73.9 HYPERGLYCEMIA: Primary | ICD-10-CM

## 2018-10-31 LAB
25(OH)D3 SERPL-MCNC: 35.2 NG/ML (ref 30–100)
ALBUMIN SERPL BCP-MCNC: 3.8 G/DL (ref 3.5–5)
ALP SERPL-CCNC: 87 U/L (ref 46–116)
ALT SERPL W P-5'-P-CCNC: 28 U/L (ref 12–78)
ANION GAP SERPL CALCULATED.3IONS-SCNC: 7 MMOL/L (ref 4–13)
AST SERPL W P-5'-P-CCNC: 20 U/L (ref 5–45)
BASOPHILS # BLD AUTO: 0.03 THOUSANDS/ΜL (ref 0–0.1)
BASOPHILS NFR BLD AUTO: 1 % (ref 0–1)
BILIRUB SERPL-MCNC: 0.7 MG/DL (ref 0.2–1)
BUN SERPL-MCNC: 13 MG/DL (ref 5–25)
CALCIUM SERPL-MCNC: 9.1 MG/DL (ref 8.3–10.1)
CHLORIDE SERPL-SCNC: 100 MMOL/L (ref 100–108)
CHOLEST SERPL-MCNC: 152 MG/DL (ref 50–200)
CO2 SERPL-SCNC: 26 MMOL/L (ref 21–32)
CREAT SERPL-MCNC: 0.87 MG/DL (ref 0.6–1.3)
EOSINOPHIL # BLD AUTO: 0.12 THOUSAND/ΜL (ref 0–0.61)
EOSINOPHIL NFR BLD AUTO: 2 % (ref 0–6)
ERYTHROCYTE [DISTWIDTH] IN BLOOD BY AUTOMATED COUNT: 12.4 % (ref 11.6–15.1)
GFR SERPL CREATININE-BSD FRML MDRD: 73 ML/MIN/1.73SQ M
GLUCOSE P FAST SERPL-MCNC: 69 MG/DL (ref 65–99)
HCT VFR BLD AUTO: 45 % (ref 34.8–46.1)
HDLC SERPL-MCNC: 61 MG/DL (ref 40–60)
HGB BLD-MCNC: 14.3 G/DL (ref 11.5–15.4)
IMM GRANULOCYTES # BLD AUTO: 0.02 THOUSAND/UL (ref 0–0.2)
IMM GRANULOCYTES NFR BLD AUTO: 0 % (ref 0–2)
LDLC SERPL DIRECT ASSAY-MCNC: 85 MG/DL (ref 0–100)
LYMPHOCYTES # BLD AUTO: 2.11 THOUSANDS/ΜL (ref 0.6–4.47)
LYMPHOCYTES NFR BLD AUTO: 34 % (ref 14–44)
MCH RBC QN AUTO: 29.6 PG (ref 26.8–34.3)
MCHC RBC AUTO-ENTMCNC: 31.8 G/DL (ref 31.4–37.4)
MCV RBC AUTO: 93 FL (ref 82–98)
MONOCYTES # BLD AUTO: 0.64 THOUSAND/ΜL (ref 0.17–1.22)
MONOCYTES NFR BLD AUTO: 10 % (ref 4–12)
NEUTROPHILS # BLD AUTO: 3.38 THOUSANDS/ΜL (ref 1.85–7.62)
NEUTS SEG NFR BLD AUTO: 53 % (ref 43–75)
NRBC BLD AUTO-RTO: 0 /100 WBCS
PLATELET # BLD AUTO: 276 THOUSANDS/UL (ref 149–390)
PMV BLD AUTO: 11.7 FL (ref 8.9–12.7)
POTASSIUM SERPL-SCNC: 3.9 MMOL/L (ref 3.5–5.3)
PROT SERPL-MCNC: 7.7 G/DL (ref 6.4–8.2)
RBC # BLD AUTO: 4.83 MILLION/UL (ref 3.81–5.12)
SODIUM SERPL-SCNC: 133 MMOL/L (ref 136–145)
TRIGL SERPL-MCNC: 96 MG/DL
WBC # BLD AUTO: 6.3 THOUSAND/UL (ref 4.31–10.16)

## 2018-10-31 PROCEDURE — 36415 COLL VENOUS BLD VENIPUNCTURE: CPT

## 2018-10-31 PROCEDURE — 99214 OFFICE O/P EST MOD 30 MIN: CPT | Performed by: INTERNAL MEDICINE

## 2018-10-31 PROCEDURE — 85025 COMPLETE CBC W/AUTO DIFF WBC: CPT

## 2018-10-31 PROCEDURE — 83721 ASSAY OF BLOOD LIPOPROTEIN: CPT

## 2018-10-31 PROCEDURE — 80061 LIPID PANEL: CPT

## 2018-10-31 PROCEDURE — 80053 COMPREHEN METABOLIC PANEL: CPT

## 2018-10-31 PROCEDURE — 82306 VITAMIN D 25 HYDROXY: CPT

## 2018-10-31 PROCEDURE — 73564 X-RAY EXAM KNEE 4 OR MORE: CPT

## 2018-10-31 RX ORDER — VITAMIN B COMPLEX
1 CAPSULE ORAL DAILY
COMMUNITY
End: 2018-10-31 | Stop reason: SDUPTHER

## 2018-10-31 NOTE — PROGRESS NOTES
Assessment/Plan:  1  Health maintenance-candidate for Shingrix vacciene-she wants to consider this in 1 year which will be 4 years after her initial zoster vaccine  2  Neck fullness-no obvious abnormality on exam-she will call if this is more of an issue  3  Incontinence-gyn had recommended she consider gyn Urology-she wants to consider regular Urology will be thinking about that -as noted has a history recurrent UTI the past and seen   [facility]  Cystoscopy in the past showed inflammatory bladder neck polyps with squamous metaplasia  In the remote past had been on Macrodantin suppression  4  Rosacea-treatment as per Dermatology-currently on suppressive doxycycline-50 milligrams-has a history of acne in the past   Remains on spironolactone  Prior androgen levels all normal  5  Status post mechanical fall with ongoing right knee pain improved but I did recommend x-ray of the right knee to rule out subtle fracture  6  Family history cancer of the colon with personal history of polyps  Colonoscopy in July 2017 showed polyps  Recommended repeat in 5 years which is in the summer 2022  7  Retinal tear-continues to recover from this  8  Dense breast-family history of breast CA as well  As noted subsequent mammograms are being done with both 2D and 3D studies  9  Prior concerned about possible hypoglycemia  Simultaneous sugar 77 with fasting insulin normal   Previously she had identified sugar 51 without definite associated symptoms-no longer an issue  10  Vitamin-D deficiency-remains on over-the-counter as well as prescription vitamin-D  11   Epistaxis-previously attributed to Claritin-D  Not an issue at present uses saline nasal spray  12  Strong family history of breast cancer-mother premenopausal breast CA  Grandmother and had premenopausal breast CA  No family history of ovarian  Also has family history cancer of the colon    Had been previously recommended the patient she had periodic MRI of the breast as well as genetic testing  She had genetic testing was told results were negative  13  Anxiety improved-monitor  14  Atypical chest pain-may represent costochondritis  Has not recurredoto  15  Dizziness-otogenic-saw Dr Gayatri Garcia spent in the past   At 1 point was being considered for possible Meniere's disease  16 general care-encouraged to proceed with DEXA scan      All other problems as per note of September 2011        MEDICAL REGIMEN:                                                  Spironolactone 50 milligrams -2 p o  b i d , multivitamin, vitamin D3/2000 units a day, vitamin-D 42648 units monthly, doxycycline 50 milligrams daily    Going for x-ray of the knee  Gyn also recommended she obtain DEXA scan  Appointment in 6 months with prior chemistry profile, cholesterol profile, CBC with diff, hemoglobin A1c    Addendum- knee x ray shows minimal djd but no fracture     No problem-specific Assessment & Plan notes found for this encounter  Diagnoses and all orders for this visit:    Hyperlipidemia, unspecified hyperlipidemia type    Dense breasts    Status post fall  -     XR knee 4+ vw right injury; Future    Other orders  -     Discontinue: b complex vitamins capsule; Take 1 capsule by mouth daily          Subjective:      Patient ID: Keyanna Mirza is a 60 y o  female  She is overall relatively stable  She talked about some recent neck fullness  She denies pain starting in the neck and shooting down the arm  She denies any numbness or tingling of the arm  She has not noticed any neck lumps etc    She had a fall in July  Was a mechanical fall  She landed on her knees and since then has significant right knee pain  She feels as though the pain is better over the last couple weeks  She had some initial knee swelling      Laboratory testing done prior to this visit shows therapeutic vitamin-D 35, LDL 85, HDL 61, triglycerides 96, cholesterol 152, chemistry profile normal with a creatinine of 0 87 CBC normal    She has some issues with incontinence  Review and recommended she consider [facility] Urology  She wants to consider regular Urology we discussed this  She has yearly mammogram with her strong family history of breast C A  She has dense breasts  She knows she needs both 2D and 3D study  She had a colonoscopy in July 2017 with her history of polyps  Repeat recommended in 5 years which would be summer 2022  Bowels have been stable  Her spirits relatively well  No when I reviewed records she would see Dr Miranda in the past   Cystoscopy showed inflammatory bladder neck polyps with squamous metaplasia in the past had been on Macrodantin suppression  She is asymptomatic times several years  This patient denies any systemic symptoms  Specifically there has been no evidence of fever, night sweats, significant weight loss or significant decrease in appetite  The following portions of the patient's history were reviewed and updated as appropriate: allergies, current medications, past family history, past medical history, past social history, past surgical history and problem list     Review of Systems   Constitutional: Negative  Respiratory: Negative  Cardiovascular: Negative  Gastrointestinal: Negative  Endocrine: Negative  Genitourinary: Negative  Musculoskeletal: Negative  Right knee pain   Neurological: Negative  Hematological: Negative  Psychiatric/Behavioral: Negative  Objective:      /70   Pulse 68   Resp 14   Ht 5' 4" (1 626 m)   BMI 24 37 kg/m²          Physical Exam   Constitutional: She is oriented to person, place, and time  She appears well-developed and well-nourished  No distress  HENT:   Head: Normocephalic and atraumatic  Right Ear: External ear normal    Left Ear: External ear normal    Nose: Nose normal    Mouth/Throat: Oropharynx is clear and moist  No oropharyngeal exudate     Eyes: Pupils are equal, round, and reactive to light  Conjunctivae and EOM are normal  Right eye exhibits no discharge  Left eye exhibits no discharge  No scleral icterus  Neck: Normal range of motion  Neck supple  No JVD present  No tracheal deviation present  No thyromegaly present  Cardiovascular: Normal rate, regular rhythm and intact distal pulses  Exam reveals no gallop and no friction rub  No murmur heard  Pulmonary/Chest: Effort normal and breath sounds normal  No respiratory distress  She has no wheezes  She has no rales  She exhibits no tenderness  Abdominal: Soft  Bowel sounds are normal  She exhibits no distension and no mass  There is no tenderness  There is no rebound and no guarding  Musculoskeletal: Normal range of motion  She exhibits no edema or deformity  Lymphadenopathy:     She has no cervical adenopathy  Neurological: She is alert and oriented to person, place, and time  She has normal reflexes  No cranial nerve deficit  She exhibits normal muscle tone  Coordination normal    Slight crepitance on range of motion of the right knee   Skin: Skin is warm and dry  No rash noted  No erythema  Psychiatric: She has a normal mood and affect   Her behavior is normal  Judgment and thought content normal

## 2018-11-02 ENCOUNTER — TELEPHONE (OUTPATIENT)
Dept: INTERNAL MEDICINE CLINIC | Facility: CLINIC | Age: 58
End: 2018-11-02

## 2018-11-02 NOTE — TELEPHONE ENCOUNTER
----- Message from Isela Hawthorne MD sent at 11/2/2018  5:52 AM EDT -----  Please call the patient regarding her knee xray- shows minimal arthritis but no evidence of fracture- good news

## 2018-11-02 NOTE — PROGRESS NOTES
Please call the patient regarding her knee xray- shows minimal arthritis but no evidence of fracture- good news

## 2018-11-19 ENCOUNTER — ANNUAL EXAM (OUTPATIENT)
Dept: OBGYN CLINIC | Facility: CLINIC | Age: 58
End: 2018-11-19
Payer: COMMERCIAL

## 2018-11-19 VITALS — SYSTOLIC BLOOD PRESSURE: 110 MMHG | DIASTOLIC BLOOD PRESSURE: 62 MMHG

## 2018-11-19 DIAGNOSIS — Z01.419 PAP SMEAR, AS PART OF ROUTINE GYNECOLOGICAL EXAMINATION: ICD-10-CM

## 2018-11-19 DIAGNOSIS — Z12.31 VISIT FOR SCREENING MAMMOGRAM: Primary | ICD-10-CM

## 2018-11-19 DIAGNOSIS — Z01.419 ENCOUNTER FOR GYNECOLOGICAL EXAMINATION: ICD-10-CM

## 2018-11-19 PROCEDURE — 99396 PREV VISIT EST AGE 40-64: CPT | Performed by: OBSTETRICS & GYNECOLOGY

## 2018-11-19 PROCEDURE — G0145 SCR C/V CYTO,THINLAYER,RESCR: HCPCS | Performed by: OBSTETRICS & GYNECOLOGY

## 2018-11-19 RX ORDER — ERGOCALCIFEROL 1.25 MG/1
CAPSULE ORAL
COMMUNITY
Start: 2018-11-07 | End: 2021-02-05 | Stop reason: ALTCHOICE

## 2018-11-19 NOTE — PROGRESS NOTES
Assessment/Plan:    No problem-specific Assessment & Plan notes found for this encounter  Normal gynecological physical examination  Self-breast examination stressed  Mammogram ordered  Discussed regular exercise, healthy diet, importance of vitamin D and calcium supplements  Discussed importance of sun block use during periods of prolonged sun exposure  Patient will be seen in 1 year for routine gynecologic and medical examination  Patient will call office for any problems, concerns, or issues which may arise during the interim  Diagnoses and all orders for this visit:    Visit for screening mammogram  -     Liquid-based pap, screening  -     Mammo screening bilateral w cad; Future    Encounter for gynecological examination  -     Liquid-based pap, screening    Pap smear, as part of routine gynecological examination    Other orders  -     ergocalciferol (VITAMIN D2) 50,000 units; Subjective:      Patient ID: Keyanna Mirza is a 60 y o  female  Patient is a 54-year-old female who presents today for her annual gynecologic and medical examination    Patient denies any vaginal bleeding    She also denies any significant vasomotor symptoms    She reports worsening urinary stress incontinence and I recommended that she follow-up with urogynecology for complete evaluation with urodynamics    She denies any change in bowel habits    She denies any significant pelvic or abdominal pain    She also denies any significant medical problems    She is up-to-date with colonoscopy screening    She is also current with mammography screening as well            The following portions of the patient's history were reviewed and updated as appropriate: allergies, current medications, past family history, past medical history, past social history, past surgical history and problem list     Review of Systems   Constitutional: Negative  HENT: Negative  Eyes: Negative  Respiratory: Negative  Cardiovascular: Negative  Gastrointestinal: Negative  Endocrine: Negative  Genitourinary: Negative  Musculoskeletal: Negative  Skin: Negative  Neurological: Negative  Psychiatric/Behavioral: Negative  Objective:      /62          Physical Exam   Constitutional: She appears well-developed  HENT:   Head: Normocephalic  Eyes: Pupils are equal, round, and reactive to light  Neck: Normal range of motion  Neck supple  Cardiovascular: Normal rate and regular rhythm  Pulmonary/Chest: Effort normal and breath sounds normal  Right breast exhibits no inverted nipple, no mass, no nipple discharge, no skin change and no tenderness  Left breast exhibits no inverted nipple, no mass, no nipple discharge, no skin change and no tenderness  Breasts are symmetrical    Abdominal: Soft  Normal appearance and bowel sounds are normal    Genitourinary: Rectum normal, vagina normal and uterus normal  Rectal exam shows guaiac negative stool  Pelvic exam was performed with patient supine  Right adnexum displays no mass, no tenderness and no fullness  Left adnexum displays no mass, no tenderness and no fullness  No vaginal discharge found  Lymphadenopathy:     She has no cervical adenopathy  She has no axillary adenopathy  Right: No inguinal and no supraclavicular adenopathy present  Left: No inguinal and no supraclavicular adenopathy present  Neurological: She is alert  Skin: Skin is intact  No rash noted  Psychiatric: She has a normal mood and affect   Her speech is normal and behavior is normal  Judgment and thought content normal  Cognition and memory are normal

## 2018-11-28 LAB
LAB AP GYN PRIMARY INTERPRETATION: NORMAL
Lab: NORMAL

## 2019-02-08 DIAGNOSIS — E55.9 VITAMIN D DEFICIENCY: Primary | ICD-10-CM

## 2019-02-08 RX ORDER — ERGOCALCIFEROL 1.25 MG/1
CAPSULE ORAL
Qty: 3 CAPSULE | Refills: 0 | Status: SHIPPED | OUTPATIENT
Start: 2019-02-08 | End: 2019-04-08 | Stop reason: SDUPTHER

## 2019-02-25 ENCOUNTER — HOSPITAL ENCOUNTER (OUTPATIENT)
Dept: RADIOLOGY | Age: 59
Discharge: HOME | End: 2019-02-25
Payer: COMMERCIAL

## 2019-02-25 VITALS — WEIGHT: 142 LBS | BODY MASS INDEX: 24.24 KG/M2 | HEIGHT: 64 IN

## 2019-02-25 DIAGNOSIS — Z12.31 ENCOUNTER FOR SCREENING MAMMOGRAM FOR MALIGNANT NEOPLASM OF BREAST: ICD-10-CM

## 2019-02-25 PROCEDURE — 77063 BREAST TOMOSYNTHESIS BI: CPT

## 2019-02-25 PROCEDURE — 77067 SCR MAMMO BI INCL CAD: CPT

## 2019-04-08 DIAGNOSIS — E55.9 VITAMIN D DEFICIENCY: ICD-10-CM

## 2019-04-09 RX ORDER — ERGOCALCIFEROL 1.25 MG/1
CAPSULE ORAL
Qty: 3 CAPSULE | Refills: 0 | Status: SHIPPED | OUTPATIENT
Start: 2019-04-09 | End: 2019-08-08 | Stop reason: SDUPTHER

## 2019-04-27 ENCOUNTER — APPOINTMENT (OUTPATIENT)
Dept: LAB | Facility: MEDICAL CENTER | Age: 59
End: 2019-04-27
Payer: COMMERCIAL

## 2019-04-27 DIAGNOSIS — E78.5 HYPERLIPIDEMIA, UNSPECIFIED HYPERLIPIDEMIA TYPE: Chronic | ICD-10-CM

## 2019-04-27 DIAGNOSIS — R73.9 HYPERGLYCEMIA: ICD-10-CM

## 2019-04-27 LAB
ALBUMIN SERPL BCP-MCNC: 3.8 G/DL (ref 3.5–5)
ALP SERPL-CCNC: 77 U/L (ref 46–116)
ALT SERPL W P-5'-P-CCNC: 23 U/L (ref 12–78)
ANION GAP SERPL CALCULATED.3IONS-SCNC: 3 MMOL/L (ref 4–13)
AST SERPL W P-5'-P-CCNC: 15 U/L (ref 5–45)
BASOPHILS # BLD AUTO: 0.03 THOUSANDS/ΜL (ref 0–0.1)
BASOPHILS NFR BLD AUTO: 1 % (ref 0–1)
BILIRUB SERPL-MCNC: 0.59 MG/DL (ref 0.2–1)
BUN SERPL-MCNC: 16 MG/DL (ref 5–25)
CALCIUM SERPL-MCNC: 8.9 MG/DL (ref 8.3–10.1)
CHLORIDE SERPL-SCNC: 105 MMOL/L (ref 100–108)
CHOLEST SERPL-MCNC: 146 MG/DL (ref 50–200)
CO2 SERPL-SCNC: 28 MMOL/L (ref 21–32)
CREAT SERPL-MCNC: 0.84 MG/DL (ref 0.6–1.3)
EOSINOPHIL # BLD AUTO: 0.11 THOUSAND/ΜL (ref 0–0.61)
EOSINOPHIL NFR BLD AUTO: 2 % (ref 0–6)
ERYTHROCYTE [DISTWIDTH] IN BLOOD BY AUTOMATED COUNT: 12.8 % (ref 11.6–15.1)
EST. AVERAGE GLUCOSE BLD GHB EST-MCNC: 111 MG/DL
GFR SERPL CREATININE-BSD FRML MDRD: 76 ML/MIN/1.73SQ M
GLUCOSE P FAST SERPL-MCNC: 75 MG/DL (ref 65–99)
HBA1C MFR BLD: 5.5 % (ref 4.2–6.3)
HCT VFR BLD AUTO: 45.5 % (ref 34.8–46.1)
HDLC SERPL-MCNC: 62 MG/DL (ref 40–60)
HGB BLD-MCNC: 14.5 G/DL (ref 11.5–15.4)
IMM GRANULOCYTES # BLD AUTO: 0.01 THOUSAND/UL (ref 0–0.2)
IMM GRANULOCYTES NFR BLD AUTO: 0 % (ref 0–2)
LDLC SERPL DIRECT ASSAY-MCNC: 78 MG/DL (ref 0–100)
LYMPHOCYTES # BLD AUTO: 1.98 THOUSANDS/ΜL (ref 0.6–4.47)
LYMPHOCYTES NFR BLD AUTO: 35 % (ref 14–44)
MCH RBC QN AUTO: 30 PG (ref 26.8–34.3)
MCHC RBC AUTO-ENTMCNC: 31.9 G/DL (ref 31.4–37.4)
MCV RBC AUTO: 94 FL (ref 82–98)
MONOCYTES # BLD AUTO: 0.56 THOUSAND/ΜL (ref 0.17–1.22)
MONOCYTES NFR BLD AUTO: 10 % (ref 4–12)
NEUTROPHILS # BLD AUTO: 2.91 THOUSANDS/ΜL (ref 1.85–7.62)
NEUTS SEG NFR BLD AUTO: 52 % (ref 43–75)
NRBC BLD AUTO-RTO: 0 /100 WBCS
PLATELET # BLD AUTO: 245 THOUSANDS/UL (ref 149–390)
PMV BLD AUTO: 11.8 FL (ref 8.9–12.7)
POTASSIUM SERPL-SCNC: 4.4 MMOL/L (ref 3.5–5.3)
PROT SERPL-MCNC: 7.5 G/DL (ref 6.4–8.2)
RBC # BLD AUTO: 4.83 MILLION/UL (ref 3.81–5.12)
SODIUM SERPL-SCNC: 136 MMOL/L (ref 136–145)
TRIGL SERPL-MCNC: 57 MG/DL
WBC # BLD AUTO: 5.6 THOUSAND/UL (ref 4.31–10.16)

## 2019-04-27 PROCEDURE — 36415 COLL VENOUS BLD VENIPUNCTURE: CPT

## 2019-04-27 PROCEDURE — 80053 COMPREHEN METABOLIC PANEL: CPT

## 2019-04-27 PROCEDURE — 83036 HEMOGLOBIN GLYCOSYLATED A1C: CPT

## 2019-04-27 PROCEDURE — 85025 COMPLETE CBC W/AUTO DIFF WBC: CPT

## 2019-04-27 PROCEDURE — 83721 ASSAY OF BLOOD LIPOPROTEIN: CPT

## 2019-04-27 PROCEDURE — 80061 LIPID PANEL: CPT

## 2019-05-06 ENCOUNTER — OFFICE VISIT (OUTPATIENT)
Dept: INTERNAL MEDICINE CLINIC | Facility: CLINIC | Age: 59
End: 2019-05-06
Payer: COMMERCIAL

## 2019-05-06 VITALS
WEIGHT: 147.2 LBS | DIASTOLIC BLOOD PRESSURE: 78 MMHG | BODY MASS INDEX: 25.13 KG/M2 | RESPIRATION RATE: 14 BRPM | HEIGHT: 64 IN | HEART RATE: 72 BPM | SYSTOLIC BLOOD PRESSURE: 118 MMHG

## 2019-05-06 DIAGNOSIS — E78.2 MIXED HYPERLIPIDEMIA: Chronic | ICD-10-CM

## 2019-05-06 DIAGNOSIS — E53.8 B12 DEFICIENCY: ICD-10-CM

## 2019-05-06 DIAGNOSIS — Z91.81 STATUS POST FALL: Primary | ICD-10-CM

## 2019-05-06 DIAGNOSIS — R53.83 FATIGUE, UNSPECIFIED TYPE: ICD-10-CM

## 2019-05-06 DIAGNOSIS — M85.89 OSTEOPENIA OF MULTIPLE SITES: ICD-10-CM

## 2019-05-06 DIAGNOSIS — E55.9 VITAMIN D DEFICIENCY: Chronic | ICD-10-CM

## 2019-05-06 DIAGNOSIS — M89.9 ABNORMAL MOVEMENT IN BONE: ICD-10-CM

## 2019-05-06 PROCEDURE — 1036F TOBACCO NON-USER: CPT | Performed by: INTERNAL MEDICINE

## 2019-05-06 PROCEDURE — 3008F BODY MASS INDEX DOCD: CPT | Performed by: INTERNAL MEDICINE

## 2019-05-06 PROCEDURE — 99214 OFFICE O/P EST MOD 30 MIN: CPT | Performed by: INTERNAL MEDICINE

## 2019-05-06 RX ORDER — CLINDAMYCIN PHOSPHATE 10 MG/ML
1 SOLUTION TOPICAL 2 TIMES DAILY
COMMUNITY
Start: 2019-02-01

## 2019-05-06 RX ORDER — SPIRONOLACTONE 50 MG/1
TABLET, FILM COATED ORAL
COMMUNITY
Start: 2019-04-23

## 2019-05-06 RX ORDER — DOXYCYCLINE HYCLATE 50 MG/1
50 CAPSULE ORAL AS NEEDED
COMMUNITY
Start: 2019-02-03

## 2019-05-08 ENCOUNTER — LAB (OUTPATIENT)
Dept: LAB | Facility: MEDICAL CENTER | Age: 59
End: 2019-05-08
Payer: COMMERCIAL

## 2019-05-08 DIAGNOSIS — R53.83 FATIGUE, UNSPECIFIED TYPE: ICD-10-CM

## 2019-05-08 LAB
CORTIS SERPL-MCNC: 16.2 UG/DL
CRP SERPL QL: <3 MG/L
ERYTHROCYTE [SEDIMENTATION RATE] IN BLOOD: 6 MM/HOUR (ref 0–20)
T4 FREE SERPL-MCNC: 0.86 NG/DL (ref 0.76–1.46)
TSH SERPL DL<=0.05 MIU/L-ACNC: 1.78 UIU/ML (ref 0.36–3.74)
VIT B12 SERPL-MCNC: 1236 PG/ML (ref 100–900)

## 2019-05-08 PROCEDURE — 82533 TOTAL CORTISOL: CPT

## 2019-05-08 PROCEDURE — 36415 COLL VENOUS BLD VENIPUNCTURE: CPT

## 2019-05-08 PROCEDURE — 84443 ASSAY THYROID STIM HORMONE: CPT

## 2019-05-08 PROCEDURE — 86140 C-REACTIVE PROTEIN: CPT

## 2019-05-08 PROCEDURE — 86618 LYME DISEASE ANTIBODY: CPT

## 2019-05-08 PROCEDURE — 85652 RBC SED RATE AUTOMATED: CPT

## 2019-05-08 PROCEDURE — 82607 VITAMIN B-12: CPT

## 2019-05-08 PROCEDURE — 84439 ASSAY OF FREE THYROXINE: CPT

## 2019-05-09 ENCOUNTER — TELEPHONE (OUTPATIENT)
Dept: INTERNAL MEDICINE CLINIC | Facility: CLINIC | Age: 59
End: 2019-05-09

## 2019-05-09 ENCOUNTER — TRANSCRIBE ORDERS (OUTPATIENT)
Dept: ADMINISTRATIVE | Age: 59
End: 2019-05-09

## 2019-05-09 ENCOUNTER — APPOINTMENT (OUTPATIENT)
Dept: RADIOLOGY | Age: 59
End: 2019-05-09
Payer: COMMERCIAL

## 2019-05-09 DIAGNOSIS — Z91.81 STATUS POST FALL: ICD-10-CM

## 2019-05-09 LAB
B BURGDOR IGG SER IA-ACNC: 0.13
B BURGDOR IGM SER IA-ACNC: 0.14

## 2019-05-09 PROCEDURE — 73080 X-RAY EXAM OF ELBOW: CPT

## 2019-05-10 ENCOUNTER — TELEPHONE (OUTPATIENT)
Dept: INTERNAL MEDICINE CLINIC | Facility: CLINIC | Age: 59
End: 2019-05-10

## 2019-08-08 DIAGNOSIS — E55.9 VITAMIN D DEFICIENCY: ICD-10-CM

## 2019-08-08 RX ORDER — ERGOCALCIFEROL 1.25 MG/1
CAPSULE ORAL
Qty: 3 CAPSULE | Refills: 0 | Status: SHIPPED | OUTPATIENT
Start: 2019-08-08 | End: 2019-10-02 | Stop reason: SDUPTHER

## 2019-08-29 ENCOUNTER — TELEPHONE (OUTPATIENT)
Dept: INTERNAL MEDICINE CLINIC | Facility: CLINIC | Age: 59
End: 2019-08-29

## 2019-08-29 NOTE — TELEPHONE ENCOUNTER
Pt having pelvic surgery on Oct 21st for incontinence so she's needs a pre-op appt and EKG before then  Please advise on when you'd like to see her

## 2019-08-30 ENCOUNTER — APPOINTMENT (EMERGENCY)
Dept: RADIOLOGY | Facility: HOSPITAL | Age: 59
DRG: 880 | End: 2019-08-30
Payer: COMMERCIAL

## 2019-08-30 ENCOUNTER — APPOINTMENT (EMERGENCY)
Dept: CT IMAGING | Facility: HOSPITAL | Age: 59
DRG: 880 | End: 2019-08-30
Payer: COMMERCIAL

## 2019-08-30 ENCOUNTER — HOSPITAL ENCOUNTER (INPATIENT)
Facility: HOSPITAL | Age: 59
LOS: 1 days | Discharge: HOME | DRG: 880 | End: 2019-08-31
Attending: EMERGENCY MEDICINE | Admitting: INTERNAL MEDICINE
Payer: COMMERCIAL

## 2019-08-30 ENCOUNTER — OFFICE VISIT (OUTPATIENT)
Dept: URGENT CARE | Age: 59
End: 2019-08-30

## 2019-08-30 VITALS
HEIGHT: 64 IN | WEIGHT: 142 LBS | SYSTOLIC BLOOD PRESSURE: 141 MMHG | DIASTOLIC BLOOD PRESSURE: 74 MMHG | TEMPERATURE: 98.5 F | HEART RATE: 114 BPM | OXYGEN SATURATION: 98 % | BODY MASS INDEX: 24.24 KG/M2

## 2019-08-30 DIAGNOSIS — R29.818 NEUROLOGICAL ABNORMALITY: Primary | ICD-10-CM

## 2019-08-30 DIAGNOSIS — R20.2 NUMBNESS AND TINGLING OF LEFT SIDE OF FACE: ICD-10-CM

## 2019-08-30 DIAGNOSIS — R20.0 NUMBNESS AND TINGLING OF LEFT SIDE OF FACE: ICD-10-CM

## 2019-08-30 DIAGNOSIS — G45.9 TIA (TRANSIENT ISCHEMIC ATTACK): Primary | ICD-10-CM

## 2019-08-30 DIAGNOSIS — R20.2 NUMBNESS AND TINGLING IN LEFT HAND: Primary | ICD-10-CM

## 2019-08-30 DIAGNOSIS — R20.0 NUMBNESS AND TINGLING IN LEFT HAND: Primary | ICD-10-CM

## 2019-08-30 LAB
ABO GROUP BLD: NORMAL
ANION GAP BLD CALC-SCNC: 14 MMOL/L (ref 4–13)
ANION GAP SERPL CALCULATED.3IONS-SCNC: 10 MMOL/L (ref 4–13)
APTT PPP: 26 SECONDS (ref 23–37)
BLD GP AB SCN SERPL QL: NEGATIVE
BUN BLD-MCNC: 14 MG/DL (ref 5–25)
BUN SERPL-MCNC: 14 MG/DL (ref 5–25)
CA-I BLD-SCNC: 1.2 MMOL/L (ref 1.12–1.32)
CALCIUM SERPL-MCNC: 8.9 MG/DL (ref 8.3–10.1)
CHLORIDE BLD-SCNC: 103 MMOL/L (ref 100–108)
CHLORIDE SERPL-SCNC: 101 MMOL/L (ref 100–108)
CO2 SERPL-SCNC: 26 MMOL/L (ref 21–32)
CREAT BLD-MCNC: 1.2 MG/DL (ref 0.6–1.3)
CREAT SERPL-MCNC: 1.16 MG/DL (ref 0.6–1.3)
ERYTHROCYTE [DISTWIDTH] IN BLOOD BY AUTOMATED COUNT: 12.5 % (ref 11.6–15.1)
GFR SERPL CREATININE-BSD FRML MDRD: 49 ML/MIN/1.73SQ M
GFR SERPL CREATININE-BSD FRML MDRD: 51 ML/MIN/1.73SQ M
GLUCOSE SERPL-MCNC: 102 MG/DL (ref 65–140)
GLUCOSE SERPL-MCNC: 112 MG/DL (ref 65–140)
GLUCOSE SERPL-MCNC: 94 MG/DL (ref 65–140)
HCT VFR BLD AUTO: 44.1 % (ref 34.8–46.1)
HCT VFR BLD CALC: 43 % (ref 34.8–46.1)
HGB BLD-MCNC: 14.5 G/DL (ref 11.5–15.4)
HGB BLDA-MCNC: 14.6 G/DL (ref 11.5–15.4)
INR PPP: 1.08 (ref 0.84–1.19)
MCH RBC QN AUTO: 30.3 PG (ref 26.8–34.3)
MCHC RBC AUTO-ENTMCNC: 32.9 G/DL (ref 31.4–37.4)
MCV RBC AUTO: 92 FL (ref 82–98)
PCO2 BLD: 26 MMOL/L (ref 21–32)
PLATELET # BLD AUTO: 258 THOUSANDS/UL (ref 149–390)
PMV BLD AUTO: 10.7 FL (ref 8.9–12.7)
POTASSIUM BLD-SCNC: 3.8 MMOL/L (ref 3.5–5.3)
POTASSIUM SERPL-SCNC: 3.9 MMOL/L (ref 3.5–5.3)
PROTHROMBIN TIME: 13.4 SECONDS (ref 11.6–14.5)
RBC # BLD AUTO: 4.78 MILLION/UL (ref 3.81–5.12)
RH BLD: NEGATIVE
SODIUM BLD-SCNC: 137 MMOL/L (ref 136–145)
SODIUM SERPL-SCNC: 137 MMOL/L (ref 136–145)
SPECIMEN EXPIRATION DATE: NORMAL
SPECIMEN SOURCE: ABNORMAL
SPECIMEN SOURCE: NORMAL
TROPONIN I BLD-MCNC: 0 NG/ML (ref 0–0.08)
WBC # BLD AUTO: 7.56 THOUSAND/UL (ref 4.31–10.16)

## 2019-08-30 PROCEDURE — 99255 IP/OBS CONSLTJ NEW/EST HI 80: CPT | Performed by: PHYSICIAN ASSISTANT

## 2019-08-30 PROCEDURE — 85610 PROTHROMBIN TIME: CPT | Performed by: EMERGENCY MEDICINE

## 2019-08-30 PROCEDURE — 86900 BLOOD TYPING SEROLOGIC ABO: CPT | Performed by: EMERGENCY MEDICINE

## 2019-08-30 PROCEDURE — 84484 ASSAY OF TROPONIN QUANT: CPT

## 2019-08-30 PROCEDURE — 82948 REAGENT STRIP/BLOOD GLUCOSE: CPT

## 2019-08-30 PROCEDURE — 99291 CRITICAL CARE FIRST HOUR: CPT

## 2019-08-30 PROCEDURE — 99223 1ST HOSP IP/OBS HIGH 75: CPT | Performed by: INTERNAL MEDICINE

## 2019-08-30 PROCEDURE — 85730 THROMBOPLASTIN TIME PARTIAL: CPT | Performed by: EMERGENCY MEDICINE

## 2019-08-30 PROCEDURE — 71045 X-RAY EXAM CHEST 1 VIEW: CPT

## 2019-08-30 PROCEDURE — 85014 HEMATOCRIT: CPT

## 2019-08-30 PROCEDURE — 80047 BASIC METABLC PNL IONIZED CA: CPT

## 2019-08-30 PROCEDURE — 70498 CT ANGIOGRAPHY NECK: CPT

## 2019-08-30 PROCEDURE — 85027 COMPLETE CBC AUTOMATED: CPT | Performed by: EMERGENCY MEDICINE

## 2019-08-30 PROCEDURE — 86901 BLOOD TYPING SEROLOGIC RH(D): CPT | Performed by: EMERGENCY MEDICINE

## 2019-08-30 PROCEDURE — 93005 ELECTROCARDIOGRAM TRACING: CPT

## 2019-08-30 PROCEDURE — 99291 CRITICAL CARE FIRST HOUR: CPT | Performed by: EMERGENCY MEDICINE

## 2019-08-30 PROCEDURE — 70496 CT ANGIOGRAPHY HEAD: CPT

## 2019-08-30 PROCEDURE — 86850 RBC ANTIBODY SCREEN: CPT | Performed by: EMERGENCY MEDICINE

## 2019-08-30 PROCEDURE — 36415 COLL VENOUS BLD VENIPUNCTURE: CPT | Performed by: EMERGENCY MEDICINE

## 2019-08-30 PROCEDURE — 80048 BASIC METABOLIC PNL TOTAL CA: CPT | Performed by: EMERGENCY MEDICINE

## 2019-08-30 RX ORDER — MELATONIN
1000 DAILY
COMMUNITY

## 2019-08-30 RX ORDER — ATORVASTATIN CALCIUM 40 MG/1
40 TABLET, FILM COATED ORAL EVERY EVENING
Status: DISCONTINUED | OUTPATIENT
Start: 2019-08-30 | End: 2019-08-31 | Stop reason: HOSPADM

## 2019-08-30 RX ORDER — SPIRONOLACTONE 25 MG/1
50 TABLET ORAL 2 TIMES DAILY
Status: DISCONTINUED | OUTPATIENT
Start: 2019-08-30 | End: 2019-08-30

## 2019-08-30 RX ORDER — SODIUM CHLORIDE 9 MG/ML
100 INJECTION, SOLUTION INTRAVENOUS CONTINUOUS
Status: DISCONTINUED | OUTPATIENT
Start: 2019-08-30 | End: 2019-08-31

## 2019-08-30 RX ORDER — MELATONIN
1000 DAILY
Status: DISCONTINUED | OUTPATIENT
Start: 2019-08-31 | End: 2019-08-31 | Stop reason: HOSPADM

## 2019-08-30 RX ORDER — ASPIRIN 81 MG/1
81 TABLET, CHEWABLE ORAL DAILY
Status: DISCONTINUED | OUTPATIENT
Start: 2019-08-31 | End: 2019-08-31 | Stop reason: HOSPADM

## 2019-08-30 RX ADMIN — ATORVASTATIN CALCIUM 40 MG: 40 TABLET, FILM COATED ORAL at 19:00

## 2019-08-30 RX ADMIN — IOHEXOL 100 ML: 350 INJECTION, SOLUTION INTRAVENOUS at 16:02

## 2019-08-30 RX ADMIN — SODIUM CHLORIDE 100 ML/HR: 0.9 INJECTION, SOLUTION INTRAVENOUS at 18:34

## 2019-08-30 NOTE — QUICK NOTE
Progress Note  Triage Asssessment   Annabel Alicea 60 y o  female MRN: [id number]    Time Called ( Time): 5879  Date Called: 08/30/19  Room#: ED14  [facility]    Situation:  Possible stroke/TIA    Critical care was consulted regarding patient evaluation of possible TIA/ Stroke, upon assessment hemodynamically stable  No significant lab results seen aside from elevated Anion gap 14  Patient said she did not want TPA due to concerns of intracranial bleed  Neurologic exam:   Physical Exam   Constitutional: She is oriented to person, place, and time  Neurological: She is alert and oriented to person, place, and time  She has normal strength  She displays no tremor  A sensory deficit (slight numbness of left arm) is present  No cranial nerve deficit  She exhibits normal muscle tone  She displays no seizure activity  GCS eye subscore is 4  GCS verbal subscore is 5  GCS motor subscore is 6  Finger tapping intact  Motor strength equal 5/5   Psychiatric: She has a normal mood and affect        CC talked to Dr Sanjana Tejada for admission under SLIM step down    Interventions:   N/A         Triage Assessment:     Patient to be admitted to step down level of care    Recommendations discussed with Dr Sanjana Tejada

## 2019-08-30 NOTE — PROGRESS NOTES
[facility]        NAME: Ksenia Schultz is a 60 y o  female  : 1960    MRN: [id number]  DATE: 2019  TIME: 4:06 PM    Assessment and Plan   Neurological abnormality [R29 818]  1  Neurological abnormality  Transfer to other facility         Patient Instructions       Follow up with PCP in 3-5 days  Proceed to  ER if symptoms worsen  Chief Complaint   No chief complaint on file  History of Present Illness       HPI    Review of Systems   Review of Systems      Current Medications     No current facility-administered medications for this visit       Current Outpatient Medications:     clindamycin (CLEOCIN T) 1 %, , Disp: , Rfl:     doxycycline hyclate (VIBRAMYCIN) 50 mg capsule, , Disp: , Rfl:     ergocalciferol (VITAMIN D2) 50,000 units, , Disp: , Rfl:     ergocalciferol (VITAMIN D2) 50,000 units, TAKE ONE CAPSULE BY MOUTH MONTHLY (Patient not taking: Reported on 2019), Disp: 3 capsule, Rfl: 0    spironolactone (ALDACTONE) 50 mg tablet, , Disp: , Rfl:     Current Allergies     Allergies as of 2019 - Reviewed 2019   Allergen Reaction Noted    Azithromycin Other (See Comments) 2013    Other  2014    Penicillins Rash 2017            The following portions of the patient's history were reviewed and updated as appropriate: allergies, current medications, past family history, past medical history, past social history, past surgical history and problem list      Past Medical History:   Diagnosis Date    Abnormal cervical Papanicolaou smear     Acne     LAST ASSESSED: 1/17/15    Asthma     Change in bowel habit     Depression     Family hx of colon cancer requiring screening colonoscopy     father colon cancer/ maternal grandfather colon cancer    Long term use of drug     LAST ASSESSED: 10/3/13    Migraine     PONV (postoperative nausea and vomiting)     Retinal tear     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     LAST ASSESSED: 3/9/13    Vascular headache        Past Surgical History:   Procedure Laterality Date    APPENDECTOMY      BREAST EXCISIONAL BIOPSY Left     BREAST LUMPECTOMY      LOCATION    COLONOSCOPY      DILATION AND CURETTAGE OF UTERUS      INCISIONAL BREAST BIOPSY      [place] COLONOSCOPY FLX DX W/COLLJ SPEC WHEN PFRMD N/A 7/17/2017    Procedure: COLONOSCOPY;  Surgeon: Erwin John MD;  [facility]: [facility]; Service: Colorectal    RETINOPATHY SURGERY      retinal tear repair x3       Family History   Problem Relation Age of Onset   Nirali Jean Breast cancer Mother 73    Colon cancer Father 61    Brain cancer Sister     Breast cancer Maternal Grandmother 52    Coronary artery disease Family     Breast cancer Family     Colon cancer Family     Hyperlipidemia Family     Emphysema Family         INTERSTITIAL    Osteoarthritis Family     Breast cancer Maternal Aunt 43    Colon cancer Maternal Grandfather 58    Breast cancer Cousin 40         Medications have been verified  Objective   There were no vitals taken for this visit         Physical Exam     Physical Exam

## 2019-08-30 NOTE — CONSULTS
Consultation - Stroke   Roman Meyer 60 y o  female MRN: [id number]  Unit/Bed#: ED 14 Encounter: [id number]      Assessment/Plan   Assessment:   Roman Meyer is a 60 y o   female with a past medical history of HLD who presents to [facility] ED on 08/30/2019 with acute onset left facial droop, left upper extremity numbness/tingling, and slurred speech  NIHSS of 1  CT head and CTA head and neck unremarkable for acute intracranial abnormalities or large vessel occlusions  Patient was not an IV tPA candidate due to resolving symptoms and low NIH  Strong suspicion for TIA, cannot completely rule out stroke at this time  Will admit on stroke pathway and obtain MRI brain  TPA Decision: Patient not a TPA candidate  Symptoms resolved/clearly non disabling  Plan:   -Admit stroke pathway  -MRI brain pending  -Echo pending   -Pending: Hemoglobin A1c, Lipid panel  -Recommend initiating ASA 81 mg daily  -Recommend initiating atorvastatin 40 mg daily  -Telemetry  -PT/OT/speech  -Allow permissive hypertension, labetalol if SBP >200  -Frequent neuro checks  -Medical management per primary team  -Continue supportive care per primary team, notify with changes    Imaging/labs:  -CT head unremarkable for acute intracranial abnormalities  -CTA head and neck unremarkable for intracranial branching large vessel occlusion/critical stenosis, unremarkable CT angiogram of the brain and neck    History of Present Illness     Reason for Consult / Principal Problem:  Left facial weakness, left upper extremity numbness/tingling, slurred speech  Hx and PE limited by: N/A  Patient last known well: 1500  Stroke alert called: 1735  Neurology time of arrival: 1546  HPI: Roman Meyer is a 60 y o   female with a past medical history of HLD who presents to [facility] ED on 08/30/2019 with acute onset left facial droop, left upper extremity numbness/tingling, and slurred speech      Patient states she was at work when she acutely developed left upper extremity numbness/tingling and word-finding difficulties which began at approximately 1500  Patient stated she could not get anything that she was thinking out  She states that her coworkers noticed a left facial droop and called EMS  Patient states the symptoms lasted approximately half an hour, and began to resolve  Upon presentation to the ED, BP was noted to be WNL at 130/62, along with her other vitals  Patient states by the time she got to the ED her slurred speech and left facial weakness had resolved  Patient states she continued to have the left upper extremity numbness/tingling, however this improved stating initially it was her entire left arm, and now is only in her fingers  Patient had an NIHSS of 1  She had a CT head and CTA head and neck which was unremarkable for acute intracranial abnormalities or large vessel occlusions  Patient was not an IV tPA candidate due to resolving symptoms and low NIHSS  Patient was admitted under the stroke pathway  Patient was thoroughly assessed after imaging completed  Patient states she continued to have the numbness/tingling in her fingertips of her left hand, stating it was improving from when she initially came into the ED  Patient states she no longer feels as if she is slurring her speech  Patient's spouse states he does not appreciate a facial weakness  No focal weakness, sensory deficits, or speech difficulty noted on neurological exam  Denies CP, SOB, headache, dizziness, vision changes, N/V, abdominal pain, weakness or worsening numbness/tingling  Consults    Review of Systems  12 point ROS performed, as stated above, all others negative      Historical Information   Past Medical History:   Diagnosis Date    Abnormal cervical Papanicolaou smear     Acne     LAST ASSESSED: 1/17/15    Asthma     Change in bowel habit     Depression     Family hx of colon cancer requiring screening colonoscopy     father colon cancer/ maternal grandfather colon cancer    Long term use of drug     LAST ASSESSED: 10/3/13    Migraine     PONV (postoperative nausea and vomiting)     Retinal tear     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     LAST ASSESSED: 3/9/13    Vascular headache      Past Surgical History:   Procedure Laterality Date    APPENDECTOMY      BREAST EXCISIONAL BIOPSY Left     BREAST LUMPECTOMY      LOCATION    COLONOSCOPY      DILATION AND CURETTAGE OF UTERUS      INCISIONAL BREAST BIOPSY      [place] COLONOSCOPY FLX DX W/COLLJ SPEC WHEN PFRMD N/A 7/17/2017    Procedure: COLONOSCOPY;  Surgeon: Nahed Rogers MD;  [facility]; Service: Colorectal    RETINOPATHY SURGERY      retinal tear repair x3     Social History   Social History     Substance and Sexual Activity   Alcohol Use Yes    Comment: social     Social History     Substance and Sexual Activity   Drug Use No     Social History     Tobacco Use   Smoking Status Never Smoker   Smokeless Tobacco Never Used     Family History:   Family History   Problem Relation Age of Onset    Breast cancer Mother 68    Colon cancer Father 61    Brain cancer Sister     Breast cancer Maternal Grandmother 52    Coronary artery disease Family     Breast cancer Family     Colon cancer Family     Hyperlipidemia Family     Emphysema Family         INTERSTITIAL    Osteoarthritis Family     Breast cancer Maternal Aunt 43    Colon cancer Maternal Grandfather 58    Breast cancer Cousin 36       Review of previous medical records was completed  Meds/Allergies   all current active meds have been reviewed, current meds:   No current facility-administered medications for this encounter     , PTA meds:   Prior to Admission Medications   Prescriptions Last Dose Informant Patient Reported? Taking?    cholecalciferol (VITAMIN D3) 1,000 units tablet 8/29/2019 at Unknown time  Yes Yes   Sig: Take 1,000 Units by mouth daily   clindamycin (CLEOCIN T) 1 % 2019 at Unknown time  Yes Yes   Si pad 2 (two) times a day    doxycycline hyclate (VIBRAMYCIN) 50 mg capsule 2019 at Unknown time  Yes Yes   Si mg daily    ergocalciferol (VITAMIN D2) 50,000 units Not Taking at Unknown time  Yes No   ergocalciferol (VITAMIN D2) 50,000 units Past Month at Unknown time  No Yes   Sig: TAKE ONE CAPSULE BY MOUTH MONTHLY   spironolactone (ALDACTONE) 50 mg tablet 2019 at Unknown time  Yes Yes   Si (two) times a day       Facility-Administered Medications: None    and     Allergies   Allergen Reactions    Azithromycin Other (See Comments)     Pt doesn't remember     Other      anesthesia    Penicillins Rash       Objective   Vitals:Blood pressure 146/63, pulse 90, temperature 98 3 °F (36 8 °C), temperature source Oral, resp  rate 12, weight 63 kg (138 lb 14 2 oz), SpO2 96 %, not currently breastfeeding  ,Body mass index is 23 84 kg/m²  No intake or output data in the 24 hours ending 19 1730    Invasive Devices: Invasive Devices     Peripheral Intravenous Line            Peripheral IV 19 Left Antecubital less than 1 day    Peripheral IV 19 Right Antecubital less than 1 day                Physical Exam   Constitutional: She appears well-developed and well-nourished  No distress  HENT:   Head: Normocephalic and atraumatic  Eyes: Pupils are equal, round, and reactive to light  Conjunctivae and EOM are normal  Right eye exhibits no discharge  Left eye exhibits no discharge  Neck: Normal range of motion  Neck supple  Cardiovascular: Normal rate, regular rhythm and normal heart sounds  Pulmonary/Chest: Effort normal  No respiratory distress  Abdominal: Soft  Bowel sounds are normal  She exhibits no distension  There is no tenderness  Musculoskeletal: Normal range of motion  Neurological: She is alert  She has a normal Finger-Nose-Finger Test    Skin: Skin is warm and dry  No rash noted  She is not diaphoretic  No erythema   No pallor  Psychiatric: She has a normal mood and affect  Her behavior is normal  Judgment and thought content normal    Nursing note and vitals reviewed  Neurologic Exam     Mental Status   Patient is alert, accompanied by spouse  Oriented to person, place  Able to state her birthday  Able to name all objects on NIHSS booklet  No dysarthria or aphasia noted  Able to follow multistep commands and answers all questions appropriately  Cranial Nerves     CN II   Visual fields full to confrontation  CN III, IV, VI   Pupils are equal, round, and reactive to light  Extraocular motions are normal    Nystagmus: none   Upgaze: normal  Downgaze: normal  Conjugate gaze: present    CN V   Facial sensation intact  CN VII   Facial expression full, symmetric  CN VIII   Hearing: intact    CN XI   CN XI normal      CN XII   Tongue deviation: none    Motor Exam   Overall muscle tone: normal  Right arm pronator drift: absent  Left arm pronator drift: absent   strength symmetric, 5/5 bilaterally  Bilateral upper extremity strength testing 5/5 throughout  Bilateral lower extremity strength testing 5/5 throughout     Sensory Exam   Light touch normal      Gait, Coordination, and Reflexes     Coordination   Finger to nose coordination: normal    Tremor   Resting tremor: absent    Reflexes   Right plantar: normal  Left plantar: normal  Right ankle clonus: present  Left ankle clonus: absent  No dysmetria noted bilateral finger-nose testing  Bilateral upper extremity biceps, triceps, brachioradialis reflexes 3+ throughout  Bilateral patellar reflexes 2+  Bilateral Achilles reflexes 2+  Right ankle exhibited sustained clonus, however upon re-evaluation only exhibited 4-5 beat ankle clonus     NIHSS:  1a Level of Consciousness: 0 = Alert   1b  LOC Questions: 0 = Answers both correctly   1c  LOC Commands: 0 = Obeys both correctly   2  Best Gaze: 0 = Normal   3  Visual: 0 = No visual field loss   4   Facial Palsy: 0=Normal symmetric movement   5a  Motor Right Arm: 0=No drift, limb holds 90 (or 45) degrees for full 10 seconds   5b  Motor Left Arm: 0=No drift, limb holds 90 (or 45) degrees for full 10 seconds   6a  Motor Right Le=No drift, limb holds 90 (or 45) degrees for full 10 seconds   6b  Motor Left Le=No drift, limb holds 90 (or 45) degrees for full 10 seconds   7  Limb Ataxia:  0=Absent   8  Sensory: 1=Mild to moderate sensory loss; patient feels pinprick is less sharp or is dull on the affected side; there is a loss of superficial pain with pinprick but patient is aware She is being touched   9  Best Language:  0=No aphasia, normal   10  Dysarthria: 0=Normal articulation   11  Extinction and Inattention (formerly Neglect): 0=No abnormality   Total Score: 1     Time NIHSS was completed: 1550    Lab Results: I have personally reviewed pertinent reports  Recent Results (from the past 24 hour(s))   POCT troponin    Collection Time: 19  3:58 PM   Result Value Ref Range    POC Troponin I 0 00 0 00 - 0 08 ng/ml    Specimen Type VENOUS    POCT Chem 8+    Collection Time: 19  3:59 PM   Result Value Ref Range    SODIUM, I-STAT 137 136 - 145 mmol/l    Potassium, i-STAT 3 8 3 5 - 5 3 mmol/L    Chloride, istat 103 100 - 108 mmol/L    CO2, i-STAT 26 21 - 32 mmol/L    Anion Gap, i-STAT 14 (H) 4 - 13 mmol/L    Calcium, Ionized i-STAT 1 20 1  12 - 1 32 mmol/L    BUN, I-STAT 14 5 - 25 mg/dl    Creatinine, i-STAT 1 2 0 6 - 1 3 mg/dl    eGFR 49 ml/min/1 73sq m    Glucose, i-STAT 94 65 - 140 mg/dl    Hct, i-STAT 43 34 8 - 46 1 %    Hgb, i-STAT 14 6 11 5 - 15 4 g/dl    Specimen Type VENOUS    Fingerstick Glucose (POCT)    Collection Time: 19  4:00 PM   Result Value Ref Range    POC Glucose 112 65 - 140 mg/dl   Basic metabolic panel    Collection Time: 19  4:04 PM   Result Value Ref Range    Sodium 137 136 - 145 mmol/L    Potassium 3 9 3 5 - 5 3 mmol/L    Chloride 101 100 - 108 mmol/L    CO2 26 21 - 32 mmol/L ANION GAP 10 4 - 13 mmol/L    BUN 14 5 - 25 mg/dL    Creatinine 1 16 0 60 - 1 30 mg/dL    Glucose 102 65 - 140 mg/dL    Calcium 8 9 8 3 - 10 1 mg/dL    eGFR 51 ml/min/1 73sq m   CBC and Platelet    Collection Time: 08/30/19  4:04 PM   Result Value Ref Range    WBC 7 56 4 31 - 10 16 Thousand/uL    RBC 4 78 3 81 - 5 12 Million/uL    Hemoglobin 14 5 11 5 - 15 4 g/dL    Hematocrit 44 1 34 8 - 46 1 %    MCV 92 82 - 98 fL    MCH 30 3 26 8 - 34 3 pg    MCHC 32 9 31 4 - 37 4 g/dL    RDW 12 5 11 6 - 15 1 %    Platelets 004 247 - 585 Thousands/uL    MPV 10 7 8 9 - 12 7 fL   Protime-INR    Collection Time: 08/30/19  4:04 PM   Result Value Ref Range    Protime 13 4 11 6 - 14 5 seconds    INR 1 08 0 84 - 1 19   APTT    Collection Time: 08/30/19  4:04 PM   Result Value Ref Range    PTT 26 23 - 37 seconds   Type and screen    Collection Time: 08/30/19  4:04 PM   Result Value Ref Range    ABO Grouping O     Rh Factor Negative     Antibody Screen Negative     Specimen Expiration Date [id number]    ]  Imaging Studies: I have personally reviewed pertinent reports and I have personally reviewed pertinent films in PACS  EKG, Pathology, and Other Studies: I have personally reviewed pertinent reports  VTE Prophylaxis:  Will be placed once initiated on stroke pathway    Counseling / Coordination of Care  Total time spent today 45 minutes critical care time  Greater than 50% of total time was spent with the patient and/or family counseling and/or coordination of care  A description of the counseling/coordination of care:  Patient was seen and evaluated  Discussed with attending  Chart reviewed thoroughly including laboratory and imaging studies    Plan of care discussed with patient and primary team

## 2019-08-30 NOTE — ED PROVIDER NOTES
History  Chief Complaint   Patient presents with    STROKE Alert     Left facial and arm numbness with difficulty speaking at 130      50-year-old previously healthy female, starting at 3:00 p m  (half an hour prior to arrival), patient developed difficulty speaking, describes it now as I could not find the right words and coworkers noticed slurred speech, and a left-sided facial droop  Patient reported subjective left arm numbness but no weakness  The symptoms lasted about half an hour  911 was called due to fear of a stroke  Upon paramedic arrival slurred speech and facial droop have completely resolved the patient is still reporting left arm numbness and tingling, upon arrival to ER and my examination, patient only complaining of numbness of her finger tips  History provided by:  Patient, EMS personnel and spouse  CVA/TIA-like Symptoms   Presenting symptoms: language symptoms and sensory loss    Presenting symptoms: no headaches    Language symptoms:     Difficulty in expression: yes      Difficulty understanding: no      Slurring of speech: yes      Severity:  Moderate  Onset quality:  Sudden  Duration:  30 minutes  Timing:  Constant  Progression:  Resolved  Similar to previous episodes: no    Associated symptoms: no chest pain, no dizziness, no fever, no nausea, no neck pain, no seizures and no vomiting        Prior to Admission Medications   Prescriptions Last Dose Informant Patient Reported? Taking?    cholecalciferol (VITAMIN D3) 1,000 units tablet 2019 at Unknown time  Yes Yes   Sig: Take 1,000 Units by mouth daily   clindamycin (CLEOCIN T) 1 % 2019 at Unknown time  Yes Yes   Si pad 2 (two) times a day    doxycycline hyclate (VIBRAMYCIN) 50 mg capsule 2019 at Unknown time  Yes Yes   Si mg daily    ergocalciferol (VITAMIN D2) 50,000 units Not Taking at Unknown time  Yes No   ergocalciferol (VITAMIN D2) 50,000 units Past Month at Unknown time  No Yes   Sig: TAKE ONE CAPSULE BY MOUTH MONTHLY   spironolactone (ALDACTONE) 50 mg tablet 2019 at Unknown time  Yes Yes   Si (two) times a day       Facility-Administered Medications: None       Past Medical History:   Diagnosis Date    Abnormal cervical Papanicolaou smear     Acne     LAST ASSESSED: 1/17/15    Asthma     Change in bowel habit     Depression     Family hx of colon cancer requiring screening colonoscopy     father colon cancer/ maternal grandfather colon cancer    Long term use of drug     LAST ASSESSED: 10/3/13    Migraine     PONV (postoperative nausea and vomiting)     Retinal tear     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     LAST ASSESSED: 3/9/13    Vascular headache        Past Surgical History:   Procedure Laterality Date    APPENDECTOMY      BREAST EXCISIONAL BIOPSY Left     BREAST LUMPECTOMY      LOCATION    COLONOSCOPY      DILATION AND CURETTAGE OF UTERUS      INCISIONAL BREAST BIOPSY      [facility] COLONOSCOPY FLX DX W/COLLJ SPEC WHEN PFRMD N/A 2017    Procedure: COLONOSCOPY;  Surgeon: Faisal Atkins MD;  Location:  GI LAB; Service: Colorectal    RETINOPATHY SURGERY      retinal tear repair x3       Family History   Problem Relation Age of Onset   Nancy Alcaraz Breast cancer Mother 73    Colon cancer Father 61    Brain cancer Sister     Breast cancer Maternal Grandmother 52    Coronary artery disease Family     Breast cancer Family     Colon cancer Family     Hyperlipidemia Family     Emphysema Family         INTERSTITIAL    Osteoarthritis Family     Breast cancer Maternal Aunt 43    Colon cancer Maternal Grandfather 58    Breast cancer Cousin 36     I have reviewed and agree with the history as documented  Social History     Tobacco Use    Smoking status: Never Smoker    Smokeless tobacco: Never Used   Substance Use Topics    Alcohol use: Yes     Comment: social    Drug use: No        Review of Systems   Constitutional: Negative for activity change, chills, diaphoresis and fever  HENT: Negative for congestion, sinus pressure and sore throat  Eyes: Negative for pain and visual disturbance  Respiratory: Negative for cough, chest tightness, shortness of breath, wheezing and stridor  Cardiovascular: Negative for chest pain and palpitations  Gastrointestinal: Negative for abdominal distention, abdominal pain, constipation, diarrhea, nausea and vomiting  Genitourinary: Negative for dysuria and frequency  Musculoskeletal: Negative for neck pain and neck stiffness  Skin: Negative for rash  Neurological: Positive for facial asymmetry, speech difficulty and numbness  Negative for dizziness, seizures, light-headedness and headaches  Physical Exam  Physical Exam   Constitutional: She is oriented to person, place, and time  She appears well-developed  No distress  HENT:   Head: Normocephalic and atraumatic  Eyes: Pupils are equal, round, and reactive to light  Neck: Normal range of motion  Neck supple  No tracheal deviation present  Cardiovascular: Normal rate, regular rhythm, normal heart sounds and intact distal pulses  No murmur heard  Pulmonary/Chest: Effort normal and breath sounds normal  No stridor  No respiratory distress  Abdominal: Soft  She exhibits no distension  There is no tenderness  There is no rebound and no guarding  Musculoskeletal: Normal range of motion  Neurological: She is alert and oriented to person, place, and time  Current NIH stroke scale of 0   Skin: Skin is warm and dry  She is not diaphoretic  No erythema  No pallor  Psychiatric: She has a normal mood and affect  Vitals reviewed        Vital Signs  ED Triage Vitals   Temperature Pulse Respirations Blood Pressure SpO2   08/30/19 1630 08/30/19 1619 08/30/19 1619 08/30/19 1619 08/30/19 1619   98 3 °F (36 8 °C) 105 16 130/62 100 %      Temp Source Heart Rate Source Patient Position - Orthostatic VS BP Location FiO2 (%)   08/30/19 1630 08/30/19 1630 -- -- --   Oral Monitor Pain Score       --                  Vitals:    08/30/19 1619 08/30/19 1630   BP: 130/62 134/61   Pulse: 105 98         Visual Acuity  Visual Acuity      Most Recent Value   L Pupil Size (mm)  4   R Pupil Size (mm)  4          ED Medications  Medications   iohexol (OMNIPAQUE) 350 MG/ML injection (MULTI-DOSE) 100 mL (100 mL Intravenous Given 8/30/19 1602)       Diagnostic Studies  Results Reviewed     Procedure Component Value Units Date/Time    Basic metabolic panel [[id number]] Collected:  08/30/19 1604    Lab Status:  Final result Specimen:  Blood from Arm, Right Updated:  08/30/19 1619     Sodium 137 mmol/L      Potassium 3 9 mmol/L      Chloride 101 mmol/L      CO2 26 mmol/L      ANION GAP 10 mmol/L      BUN 14 mg/dL      Creatinine 1 16 mg/dL      Glucose 102 mg/dL      Calcium 8 9 mg/dL      eGFR 51 ml/min/1 73sq m     Narrative:       [place] guidelines for Chronic Kidney Disease (CKD):     Stage 1 with normal or high GFR (GFR > 90 mL/min/1 73 square meters)    Stage 2 Mild CKD (GFR = 60-89 mL/min/1 73 square meters)    Stage 3A Moderate CKD (GFR = 45-59 mL/min/1 73 square meters)    Stage 3B Moderate CKD (GFR = 30-44 mL/min/1 73 square meters)    Stage 4 Severe CKD (GFR = 15-29 mL/min/1 73 square meters)    Stage 5 End Stage CKD (GFR <15 mL/min/1 73 square meters)  Note: GFR calculation is accurate only with a steady state creatinine    Protime-INR [[id number]]  (Normal) Collected:  08/30/19 1604    Lab Status:  Final result Specimen:  Blood from Arm, Right Updated:  08/30/19 1619     Protime 13 4 seconds      INR 1 08    APTT [[id number]]  (Normal) Collected:  08/30/19 1604    Lab Status:  Final result Specimen:  Blood from Arm, Right Updated:  08/30/19 1619     PTT 26 seconds     POCT troponin [[id number]] Collected:  08/30/19 1558    Lab Status:  Final result Specimen:  Venous Updated:  08/30/19 1612     POC Troponin I 0 00 ng/ml      Specimen Type VENOUS    Narrative: Abbott i-Stat handheld analyzer 99% cutoff is > 0 08ng/mL in [facility] Emergency Departments    o cTnI 99% cutoff is useful only when applied to patients in the clinical setting of myocardial ischemia  o cTnI 99% cutoff should be interpreted in the context of clinical history, ECG findings and possibly cardiac imaging to establish correct diagnosis  o cTnI 99% cutoff may be suggestive but clearly not indicative of a coronary event without the clinical setting of myocardial ischemia        CBC and Platelet [[id number]]  (Normal) Collected:  08/30/19 1604    Lab Status:  Final result Specimen:  Blood from Arm, Right Updated:  08/30/19 1610     WBC 7 56 Thousand/uL      RBC 4 78 Million/uL      Hemoglobin 14 5 g/dL      Hematocrit 44 1 %      MCV 92 fL      MCH 30 3 pg      MCHC 32 9 g/dL      RDW 12 5 %      Platelets 121 Thousands/uL      MPV 10 7 fL     POCT Chem 8+ [[id number]]  (Abnormal) Collected:  08/30/19 1559    Lab Status:  Final result Specimen:  Venous Updated:  08/30/19 1603     SODIUM, I-STAT 137 mmol/l      Potassium, i-STAT 3 8 mmol/L      Chloride, istat 103 mmol/L      CO2, i-STAT 26 mmol/L      Anion Gap, i-STAT 14 mmol/L      Calcium, Ionized i-STAT 1 20 mmol/L      BUN, I-STAT 14 mg/dl      Creatinine, i-STAT 1 2 mg/dl      eGFR 49 ml/min/1 73sq m      Glucose, i-STAT 94 mg/dl      Hct, i-STAT 43 %      Hgb, i-STAT 14 6 g/dl      Specimen Type VENOUS    Narrative:       [employer] guidelines for Chronic Kidney Disease (CKD):     Stage 1 with normal or high GFR (GFR > 90 mL/min/1 73 square meters)    Stage 2 Mild CKD (GFR = 60-89 mL/min/1 73 square meters)    Stage 3A Moderate CKD (GFR = 45-59 mL/min/1 73 square meters)    Stage 3B Moderate CKD (GFR = 30-44 mL/min/1 73 square meters)    Stage 4 Severe CKD (GFR = 15-29 mL/min/1 73 square meters)    Stage 5 End Stage CKD (GFR <15 mL/min/1 73 square meters)  Note: GFR calculation is accurate only with a steady state creatinine    Fingerstick Glucose (POCT) [[id number]]  (Normal) Collected:  08/30/19 1600    Lab Status:  Final result Updated:  08/30/19 1602     POC Glucose 112 mg/dl                  XR stroke alert portable chest   Final Result by Anca Penn DO (08/30 1648)      No acute cardiopulmonary disease  Workstation performed: [id number]         CTA stroke alert (head/neck)   Final Result by Rina Hardy MD (08/30 1621)      1  No intracranial branching large vessel occlusion/critical stenosis  Unremarkable CT angiogram of the brain  2   Unremarkable CT angiogram of the neck  Findings were directly discussed with Pallavi Sullivan on 8/30/2019 4:12 PM                      Workstation performed: [id number]         CT stroke alert brain   Final Result by Rina Hardy MD (08/30 1622)      No acute intracranial CT abnormality              Findings were directly discussed with Pallavi Sullivan on 8/30/2019 4:12 PM          Workstation performed: [id number]                    Procedures  ECG 12 Lead Documentation Only  Date/Time: 8/30/2019 4:22 PM  Performed by: No Holland DO  Authorized by: No Holland DO     ECG reviewed by me, the ED Provider: yes    Patient location:  ED  Previous ECG:     Previous ECG:  Compared to current    Comparison ECG info:  1993    Similarity:  No change  Interpretation:     Interpretation: normal    Rate:     ECG rate:  98    ECG rate assessment: normal    Rhythm:     Rhythm: sinus rhythm    Ectopy:     Ectopy: none    QRS:     QRS axis:  Normal    QRS intervals:  Normal  Conduction:     Conduction: normal    ST segments:     ST segments:  Normal  T waves:     T waves: normal    CriticalCare Time  Performed by: No Holland DO  Authorized by: No Holland DO     Critical care provider statement:     Critical care time (minutes):  35    Critical care time was exclusive of:  Separately billable procedures and treating other patients    Critical care was necessary to treat or prevent imminent or life-threatening deterioration of the following conditions: Stroke alert  Critical care was time spent personally by me on the following activities:  Obtaining history from patient or surrogate, development of treatment plan with patient or surrogate, discussions with consultants, evaluation of patient's response to treatment, examination of patient, ordering and review of laboratory studies, ordering and review of radiographic studies, re-evaluation of patient's condition and review of old charts           ED Course  ED Course as of Aug 30 1654   Fri Aug 30, 2019   1607 Discussed case with Neurology, no tPA at this time due to current NIH stroke scale of 0      1653 Multiple repeat evaluations throughout ER stay, NIH stroke scale remained 0 , remainder workup unremarkable  Patient disposed to step-down ICU for continued close monitoring                  Stroke Assessment     Row Name 08/30/19 1550             NIH Stroke Scale    Interval  Baseline      Level of Consciousness (1a )  0      LOC Questions (1b )  0      LOC Commands (1c )  0      Best Gaze (2 )  0      Visual (3 )  0      Facial Palsy (4 )  0      Motor Arm, Left (5a )  0      Motor Arm, Right (5b )  0      Motor Leg, Left (6a )  0      Motor Leg, Right (6b )  0      Limb Ataxia (7 )  0      Sensory (8 )  0      Best Language (9 )  0      Dysarthria (10 )  0      Extinction and Inattention (11 ) (Formerly Neglect)  0      Total  0          First Filed Value   TPA Decision  Patient not a TPA candidate  Patient is not a candidate options  Symptoms resolved/clearly non disabling  MDM  Number of Diagnoses or Management Options  TIA (transient ischemic attack): new and requires workup  Diagnosis management comments:       Initial ED assessment:  57-year-old female presents with symptoms concerning for a TIA    She had sudden onset left-sided facial droop, slurred speech and numbness of the left arm, symptoms have currently resolved and current NIH stroke scale is 0    Initial DDx includes but is not limited to:   TIA, CVA, intracranial neoplasm    Initial ED plan:   Pre-hospital stroke alert was initiated from paramedic command call, patient was brought directly to CT  Blood work drawn  , case discussed with Neurology will discussed case with Radiology upon reading of images    Patient not given tPA due to current NIH stroke scale of 0  Final ED summary/disposition:   After evaluation and workup in the emergency department, symptoms still remained completely resolved  NIH stroke scale 0 throughout ED stay  Patient to be admitted to step-down unit for very frequent neuro checks due to patient still within tPA window if symptoms recur  Will contact critical care team for step-down placement        Amount and/or Complexity of Data Reviewed  Clinical lab tests: ordered and reviewed  Tests in the radiology section of CPT®: ordered and reviewed  Decide to obtain previous medical records or to obtain history from someone other than the patient: yes  Obtain history from someone other than the patient: yes  Review and summarize past medical records: yes  Discuss the patient with other providers: yes  Independent visualization of images, tracings, or specimens: yes        Disposition  Final diagnoses:   TIA (transient ischemic attack)     Time reflects when diagnosis was documented in both MDM as applicable and the Disposition within this note     Time User Action Codes Description Comment    8/30/2019  4:51 PM Leticia Sprague Add [G45 9] TIA (transient ischemic attack)       ED Disposition     ED Disposition Condition Date/Time Comment    Admit Stable Fri Aug 30, 2019  4:51 PM Case was discussed with [id number] and the patient's admission status was agreed to be Admission Status: inpatient status to the service of Dr Sachin Almanzar           Follow-up Information    None         Patient's Medications   Discharge Prescriptions    No medications on file     No discharge procedures on file      ED Provider  Electronically Signed by           Marlee Carver, DO  08/30/19 1325 [facility], DO  08/30/19 [id number]

## 2019-08-30 NOTE — ASSESSMENT & PLAN NOTE
· Patient presented to the ED for sudden onset left arm numbness, slurred speech, left-sided facial droop lasting for approximately 30 minutes  · By the time she arrived to the ED she was noted to have numbness remaining in her fingers but according to family was still presenting with slurred speech  · This patient has past medical history of hyperlipidemia but is not on a statin  · She denied nausea, dizziness, headache, visual changes, chest pain or shortness of breath  · At time of admission she has an NIH stroke scale score of 0 and appears to be at her baseline according to her and her family, slurred speech, left-sided weakness, and facial droop have resolved at this time  · CT head done in the ED showed no acute intracranial processes or abnormalities, CTA head and neck was unremarkable, chest x-ray showed no acute cardiopulmonary processes, and EKG showed normal sinus rhythm of 90 beats per minute    · Patient has been admitted to the ICU under stroke protocol, MRI head and neck ordered  · Started aspirin and statin  · Continued neuro checks  · Neurology on consult, appreciate the recommendations

## 2019-08-30 NOTE — H&P
H&P- Clemon Situ 1960, 60 y o  female MRN: [id number]    Unit/Bed#:  Encounter: [id number]    Primary Care Provider: Maryan Ramon MD   Date and time admitted to hospital: 8/30/2019  3:54 PM        TIA (transient ischemic attack)  Assessment & Plan  · Patient presented to the ED for sudden onset left arm numbness, slurred speech, left-sided facial droop lasting for approximately 30 minutes  · By the time she arrived to the ED she was noted to have numbness remaining in her fingers but according to family was still presenting with slurred speech  · This patient has past medical history of hyperlipidemia but is not on a statin  · She denied nausea, dizziness, headache, visual changes, chest pain or shortness of breath  · At time of admission she has an NIH stroke scale score of 0 and appears to be at her baseline according to her and her family, slurred speech, left-sided weakness, and facial droop have resolved at this time  · CT head done in the ED showed no acute intracranial processes or abnormalities, CTA head and neck was unremarkable, chest x-ray showed no acute cardiopulmonary processes, and EKG showed normal sinus rhythm of 90 beats per minute  · Patient has been admitted to the ICU under stroke protocol, MRI head and neck ordered  · Started aspirin and statin  · Continued neuro checks  · Neurology on consult, appreciate the recommendations        VTE Prophylaxis: Not needed due to low VTE score  / reason for no mechanical VTE prophylaxis Not needed due to low the VTE score   Code Status:  Full  POLST: POLST form is not discussed and not completed at this time  Anticipated Length of Stay:  Patient will be admitted on an Inpatient basis with an anticipated length of stay of 2 midnights     Justification for Hospital Stay:  Workup for stroke/TIA    Chief Complaint:   30 minutes of stroke-like symptoms including left arm numbness, slurred speech, and left facial droop    History of Present Illness:    Aba Dubon is a 60 y o  female with past medical history of hyperlipidemia (not on a statin) and acne (on spironolactone, clindamycin, doxycycline) who presents with 30 minutes of stroke-like symptoms including left arm numbness, slurred speech, left-sided facial droop  The patient was at work at [employer] when she suddenly had difficulty getting words out, with left arm numbness that started at the same time  She states that the worst of the symptoms lasted for 5 minutes, after which time she was able to speak again but her speech was slurred  By the time she presented to the ED via EMS most of her symptoms had resolved, although she was noted to still have some left-sided facial drooping and some slurred speech on presentation and was complaining that her left fingers were numb  By the time of admission all symptoms had resolved facial droop is no longer present, speech was back to normal, left arm numbness had resolved with exception to possible numbness remaining in 1 or 2 fingers, and the patient states she feels almost back to baseline with exception to being anxious  Vital signs in the ED were stable, blood pressure of 148/56 and no cranial nerve abnormalities were found  CT head showed no acute intracranial process, CTA head and neck was unremarkable, chest x-ray showed no acute cardiopulmonary process, EKG showed normal sinus rhythm  Symptoms appear related to TIA at this time, patient has been admitted under stroke protocol, and will receive MRI, and neurology is on board  Review of Systems:    Review of Systems   Constitutional: Negative for fever  Respiratory: Negative for shortness of breath  Cardiovascular: Negative for chest pain  Gastrointestinal: Negative for nausea  Neurological: Positive for facial asymmetry and speech difficulty  Negative for dizziness, weakness, numbness and headaches  All other systems reviewed and are negative        Past Medical and Surgical History:     Past Medical History:   Diagnosis Date    Abnormal cervical Papanicolaou smear     Acne     LAST ASSESSED: 1/17/15    Asthma     Change in bowel habit     Depression     Family hx of colon cancer requiring screening colonoscopy     father colon cancer/ maternal grandfather colon cancer    Long term use of drug     LAST ASSESSED: 10/3/13    Migraine     PONV (postoperative nausea and vomiting)     Retinal tear     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 Authorizing Provider   cholecalciferol (VITAMIN D3) 1,000 units tablet Take 1,000 Units by mouth daily   Yes Historical Provider, MD   clindamycin (CLEOCIN T) 1 % 1 pad 2 (two) times a day  2/1/19  Yes Historical Provider, MD   doxycycline hyclate (VIBRAMYCIN) 50 mg capsule 50 mg daily  2/3/19  Yes Historical Provider, MD   ergocalciferol (VITAMIN D2) 50,000 units TAKE ONE CAPSULE BY MOUTH MONTHLY 8/8/19  Yes Guicho Queen MD   spironolactone (ALDACTONE) 50 mg tablet 2 (two) times a day  4/23/19  Yes Historical Provider, MD   ergocalciferol (VITAMIN D2) 50,000 units  11/7/18   Historical Provider, MD     I have reviewed home medications with patient personally  Allergies:    Allergies   Allergen Reactions    Azithromycin Other (See Comments)     Pt doesn't remember     Other      anesthesia    Penicillins Rash       Social History:     Marital Status: /Civil Union   Occupation:  Nurse  Patient Pre-hospital Living Situation:  Home  Patient Pre-hospital Level of Mobility:  No mobility issues  Patient Pre-hospital Diet Restrictions:  None  Substance Use History:   Social History     Substance and Sexual Activity   Alcohol Use Yes    Comment: social     Social History     Tobacco Use   Smoking Status Never Smoker   Smokeless Tobacco Never Used     Social History     Substance and Sexual Activity   Drug Use No       Family History:    Family History   Problem Relation Age of Onset    Breast cancer Mother 68    Colon cancer Father 61    Brain cancer Sister     Breast cancer Maternal Grandmother 52    Coronary artery disease Family     Breast cancer Family     Colon cancer Family     Hyperlipidemia Family     Emphysema Family         INTERSTITIAL    Osteoarthritis Family     Breast cancer Maternal Aunt 43    Colon cancer Maternal Grandfather 58    Breast cancer Cousin 40       Physical Exam:     Vitals:   Blood Pressure: 110/57 (08/30/19 1830)  Pulse: 95 (08/30/19 1830)  Temperature: 98 3 °F (36 8 °C) (08/30/19 1821)  Temp Source: Oral (08/30/19 1821)  Respirations: 20 (08/30/19 1830)  Height: 5' 4" (162 6 cm) (08/30/19 1821)  Weight - Scale: 63 7 kg (140 lb 6 9 oz) (08/30/19 1821)  SpO2: 98 % (08/30/19 1830)    Physical Exam   Constitutional: She is oriented to person, place, and time  She appears well-developed and well-nourished  HENT:   Head: Normocephalic and atraumatic  Eyes: Pupils are equal, round, and reactive to light  Conjunctivae and EOM are normal    Neck: Normal range of motion  Neck supple  Cardiovascular: Normal rate, regular rhythm, normal heart sounds and intact distal pulses  Pulmonary/Chest: Effort normal and breath sounds normal    Abdominal: Soft  Bowel sounds are normal    Musculoskeletal: Normal range of motion  Neurological: She is alert and oriented to person, place, and time   She has normal strength  No cranial nerve deficit or sensory deficit  She exhibits normal muscle tone  GCS eye subscore is 4  GCS verbal subscore is 5  GCS motor subscore is 6  Finger-to-nose test was within normal limits, heel to shin test was within normal limits  NIH stroke scale of 0   Skin: Skin is warm and dry  Capillary refill takes less than 2 seconds  Psychiatric: She has a normal mood and affect  Nursing note and vitals reviewed  Additional Data:     Lab Results: I have personally reviewed pertinent reports  Results from last 7 days   Lab Units 08/30/19  1604   WBC Thousand/uL 7 56   HEMOGLOBIN g/dL 14 5   HEMATOCRIT % 44 1   PLATELETS Thousands/uL 258     Results from last 7 days   Lab Units 08/30/19  1604 08/30/19  1559   POTASSIUM mmol/L 3 9  --    CHLORIDE mmol/L 101  --    CO2 mmol/L 26  --    CO2, I-STAT mmol/L  --  26   BUN mg/dL 14  --    CREATININE mg/dL 1 16  --    CALCIUM mg/dL 8 9  --    GLUCOSE, ISTAT mg/dl  --  94     Results from last 7 days   Lab Units 08/30/19  1604   INR  1 08       Imaging: I have personally reviewed pertinent reports  Xr Stroke Alert Portable Chest    Result Date: 8/30/2019  Narrative: CHEST INDICATION:  Stroke alert  COMPARISON:  12/31/2011 EXAM PERFORMED/VIEWS:  XR STROKE ALERT PORTABLE CHEST FINDINGS: Cardiomediastinal silhouette appears unremarkable  The lungs are clear  No pneumothorax or pleural effusion  Osseous structures appear within normal limits for patient age  Impression: No acute cardiopulmonary disease  Workstation performed: [id number]     Ct Stroke Alert Brain    Result Date: 8/30/2019  Narrative: CT BRAIN - STROKE ALERT PROTOCOL INDICATION:   slurred speech, left arm numbness  COMPARISON:  None  TECHNIQUE:  CT examination of the brain was performed  In addition to axial images, coronal reformatted images were created and submitted for interpretation  Radiation dose length product (DLP) for this visit:  936 mGy-cm     This examination, like all CT scans performed in the [facility], was performed utilizing techniques to minimize radiation dose exposure, including the use of iterative reconstruction and automated exposure control  IMAGE QUALITY:  Diagnostic  FINDINGS:  PARENCHYMA:  No intracranial masslike lesion, mass effect or midline shift  No acute intracranial hemorrhage  No CT signs of acute infarction  Normal intracranial vasculature  VENTRICLES AND EXTRA-AXIAL SPACES:  Normal for patient's age  VISUALIZED ORBITS AND PARANASAL SINUSES:  Unremarkable  CALVARIUM AND EXTRACRANIAL SOFT TISSUES:   Normal      Impression: No acute intracranial CT abnormality  Findings were directly discussed with Linda Call on 8/30/2019 4:12 PM  Workstation performed: [id number]     Cta Stroke Alert (head/neck)    Result Date: 8/30/2019  Narrative: CTA NECK AND BRAIN WITH CONTRAST INDICATION: slurred speech, left arm numbness COMPARISON:   Same date head CT TECHNIQUE:   Post contrast imaging was performed after administration of iodinated contrast through the neck and brain  Post contrast axial 0 625 mm images timed to opacify the arterial system  3D rendering was performed on an independent workstation  MIP reconstructions performed  Coronal reconstructions were performed of the noncontrast portion of the brain  Radiation dose length product (DLP) for this visit:  336 mGy-cm   This examination, like all CT scans performed in the [facility], was performed utilizing techniques to minimize radiation dose exposure, including the use of iterative reconstruction and automated exposure control  IV Contrast:  100 mL of iohexol (OMNIPAQUE)  IMAGE QUALITY:   Diagnostic FINDINGS: CERVICAL VASCULATURE AORTIC ARCH AND GREAT VESSELS:   Mild atherosclerotic calcification of the aortic arch  Great vessel origins and visualized subclavian arteries are unremarkable   RIGHT VERTEBRAL ARTERY CERVICAL SEGMENT:The vessel is patent at the origin and throughout the neck  No significant stenosis  LEFT VERTEBRAL ARTERY CERVICAL SEGMENT: The vessel is patent at the origin and throughout the neck  No significant stenosis  RIGHT EXTRACRANIAL CAROTID SEGMENT:The common carotid artery is unremarkable  The carotid bifurcation and cervical internal carotid artery are unremarkable  No stenosis or dissection  LEFT EXTRACRANIAL CAROTID SEGMENT: The common carotid artery is unremarkable  The carotid bifurcation and cervical internal carotid artery are unremarkable  No stenosis or dissection  NASCET criteria was used to determine the degree of internal carotid artery diameter stenosis  INTRACRANIAL VASCULATURE INTERNAL CAROTID ARTERIES: Minimal atherosclerotic disease of cavernous and supraclinoid internal carotid arteries without significant stenosis  Normal ophthalmic artery origins  ANTERIOR CIRCULATION:  Normal right dominant A1 segments  Bilateral anterior cerebral arteries are unremarkable   Normal anterior comminuting artery  MIDDLE CEREBRAL ARTERY CIRCULATION:  Bilateral M1 segments and major M2 branches are patent without critical stenosis  DISTAL VERTEBRAL ARTERIES:  Distal vertebral arteries are unremarkable  Posterior inferior cerebellar artery origins are patent  BASILAR ARTERY:  Basilar artery is normal in caliber  Normal superior cerebellar arteries  POSTERIOR CEREBRAL ARTERIES: Bilateral posterior cerebral arteries are unremarkable without critical stenosis  Hypoplastic left posterior communicating artery DURAL VENOUS SINUSES:  Unremarkable  NON VASCULAR ANATOMY BONY STRUCTURES: No acute or aggressive appearing osseous abnormality  SOFT TISSUES OF THE NECK: Left thyroid nodule measuring less than 1 5 cm   According to guidelines published in the February 2015 white paper on incidental thyroid nodules in the Journal of the [employer] of Radiology [facility]), because the nodule is less than 1 5 cm in size and without suspicious feature, no further evaluation is recommended     THORACIC INLET:  Unremarkable  Impression: 1  No intracranial branching large vessel occlusion/critical stenosis  Unremarkable CT angiogram of the brain  2   Unremarkable CT angiogram of the neck  Findings were directly discussed with Glenys Hernandez on 8/30/2019 4:12 PM  Workstation performed: [id number]       EKG, Pathology, and Other Studies Reviewed on Admission:   · EKG:  Normal sinus    Epic / Care Everywhere Records Reviewed: Yes     ** Please Note: This note has been constructed using a voice recognition system   **

## 2019-08-30 NOTE — PLAN OF CARE
Problem: PAIN - ADULT  Goal: Verbalizes/displays adequate comfort level or baseline comfort level  Description  Interventions:  - Encourage patient to monitor pain and request assistance  - Assess pain using appropriate pain scale  - Administer analgesics based on type and severity of pain and evaluate response  - Implement non-pharmacological measures as appropriate and evaluate response  - Consider cultural and social influences on pain and pain management  - Notify physician/advanced practitioner if interventions unsuccessful or patient reports new pain  Outcome: Progressing     Problem: INFECTION - ADULT  Goal: Absence or prevention of progression during hospitalization  Description  INTERVENTIONS:  - Assess and monitor for signs and symptoms of infection  - Monitor lab/diagnostic results  - Monitor all insertion sites, i e  indwelling lines, tubes, and drains  - Monitor endotracheal if appropriate and nasal secretions for changes in amount and color  - [place] appropriate cooling/warming therapies per order  - Administer medications as ordered  - Instruct and encourage patient and family to use good hand hygiene technique  - Identify and instruct in appropriate isolation precautions for identified infection/condition  Outcome: Progressing  Goal: Absence of fever/infection during neutropenic period  Description  INTERVENTIONS:  - Monitor WBC    Outcome: Progressing     Problem: SAFETY ADULT  Goal: Patient will remain free of falls  Description  INTERVENTIONS:  - Assess patient frequently for physical needs  -  Identify cognitive and physical deficits and behaviors that affect risk of falls    -  [place] fall precautions as indicated by assessment   - Educate patient/family on patient safety including physical limitations  - Instruct patient to call for assistance with activity based on assessment  - Modify environment to reduce risk of injury  - Consider OT/PT consult to assist with strengthening/mobility  Outcome: Progressing  Goal: Maintain or return to baseline ADL function  Description  INTERVENTIONS:  -  Assess patient's ability to carry out ADLs; assess patient's baseline for ADL function and identify physical deficits which impact ability to perform ADLs (bathing, care of mouth/teeth, toileting, grooming, dressing, etc )  - Assess/evaluate cause of self-care deficits   - Assess range of motion  - Assess patient's mobility; develop plan if impaired  - Assess patient's need for assistive devices and provide as appropriate  - Encourage maximum independence but intervene and supervise when necessary  - Involve family in performance of ADLs  - Assess for home care needs following discharge   - Consider OT consult to assist with ADL evaluation and planning for discharge  - Provide patient education as appropriate  Outcome: Progressing  Goal: Maintain or return mobility status to optimal level  Description  INTERVENTIONS:  - Assess patient's baseline mobility status (ambulation, transfers, stairs, etc )    - Identify cognitive and physical deficits and behaviors that affect mobility  - Identify mobility aids required to assist with transfers and/or ambulation (gait belt, sit-to-stand, lift, walker, cane, etc )  - [place] fall precautions as indicated by assessment  - Record patient progress and toleration of activity level on Mobility SBAR; progress patient to next Phase/Stage  - Instruct patient to call for assistance with activity based on assessment  - Consider rehabilitation consult to assist with strengthening/weightbearing, etc   Outcome: Progressing     Problem: DISCHARGE PLANNING  Goal: Discharge to home or other facility with appropriate resources  Description  INTERVENTIONS:  - Identify barriers to discharge w/patient and caregiver  - Arrange for needed discharge resources and transportation as appropriate  - Identify discharge learning needs (meds, wound care, etc )  - Arrange for interpretive services to assist at discharge as needed  - Refer to Case Management Department for coordinating discharge planning if the patient needs post-hospital services based on physician/advanced practitioner order or complex needs related to functional status, cognitive ability, or social support system  Outcome: Progressing     Problem: Knowledge Deficit  Goal: Patient/family/caregiver demonstrates understanding of disease process, treatment plan, medications, and discharge instructions  Description  Complete learning assessment and assess knowledge base  Interventions:  - Provide teaching at level of understanding  - Provide teaching via preferred learning methods  Outcome: Progressing     Problem: Neurological Deficit  Goal: Neurological status is stable or improving  Description  Interventions:  - Monitor and assess patient's level of consciousness, motor function, sensory function, and level of assistance needed for ADLs  - Monitor and report changes from baseline  Collaborate with interdisciplinary team to initiate plan and implement interventions as ordered  - Provide and maintain a safe environment  - Consider seizure precautions  - Consider fall precautions  - Consider aspiration precautions  - Consider bleeding precautions  Outcome: Progressing     Problem: Activity Intolerance/Impaired Mobility  Goal: Mobility/activity is maintained at optimum level for patient  Description  Interventions:  - Assess and monitor patient  barriers to mobility and need for assistive/adaptive devices  - Assess patient's emotional response to limitations  - Collaborate with interdisciplinary team and initiate plans and interventions as ordered  - Encourage independent activity per ability   - Maintain proper body alignment  - Perform active/passive rom as tolerated/ordered    - Plan activities to conserve energy   - Turn patient as appropriate  Outcome: Progressing     Problem: Communication Impairment  Goal: Ability to express needs and understand communication  Description  Assess patient's communication skills and ability to understand information  Patient will demonstrate use of effective communication techniques, alternative methods of communication and understanding even if not able to speak  - Encourage communication and provide alternate methods of communication as needed  - Collaborate with case management/ for discharge needs  - Include patient/family/caregiver in decisions related to communication  Outcome: Progressing     Problem: Potential for Aspiration  Goal: Non-ventilated patient's risk of aspiration is minimized  Description  Assess and monitor vital signs, respiratory status, and labs (WBC)  Monitor for signs of aspiration (tachypnea, cough, rales, wheezing, cyanosis, fever)  - Assess and monitor patient's ability to swallow  - Place patient up in chair to eat if possible  - HOB up at 90 degrees to eat if unable to get patient up into chair   - Supervise patient during oral intake  - Instruct patient/ family to take small bites  - Instruct patient/ family to take small single sips when taking liquids  - Follow patient-specific strategies generated by speech pathologist   Outcome: Progressing  Goal: Ventilated patient's risk of aspiration is minimized  Description  Assess and monitor vital signs, respiratory status, airway cuff pressure, and labs (WBC)  Monitor for signs of aspiration (tachypnea, cough, rales, wheezing, cyanosis, fever)  - Elevate head of bed 30 degrees if patient has tube feeding   - Monitor tube feeding  Outcome: Progressing     Problem: Nutrition  Goal: Nutrition/Hydration status is improving  Description  Monitor and assess patient's nutrition/hydration status for malnutrition (ex- brittle hair, bruises, dry skin, pale skin and conjunctiva, muscle wasting, smooth red tongue, and disorientation)   Collaborate with interdisciplinary team and initiate plan and interventions as ordered  Monitor patient's weight and dietary intake as ordered or per policy  Utilize nutrition screening tool and intervene per policy  Determine patient's food preferences and provide high-protein, high-caloric foods as appropriate  - Assist patient with eating   - Allow adequate time for meals   - Encourage patient to take dietary supplement as ordered  - Collaborate with clinical nutritionist   - Include patient/family/caregiver in decisions related to nutrition    Outcome: Progressing

## 2019-08-31 ENCOUNTER — APPOINTMENT (INPATIENT)
Dept: MRI IMAGING | Facility: HOSPITAL | Age: 59
DRG: 880 | End: 2019-08-31
Payer: COMMERCIAL

## 2019-08-31 VITALS
SYSTOLIC BLOOD PRESSURE: 100 MMHG | WEIGHT: 140.43 LBS | RESPIRATION RATE: 18 BRPM | OXYGEN SATURATION: 96 % | TEMPERATURE: 99 F | HEIGHT: 64 IN | BODY MASS INDEX: 23.98 KG/M2 | DIASTOLIC BLOOD PRESSURE: 55 MMHG | HEART RATE: 74 BPM

## 2019-08-31 PROBLEM — G45.9 TIA (TRANSIENT ISCHEMIC ATTACK): Status: RESOLVED | Noted: 2019-08-30 | Resolved: 2019-08-31

## 2019-08-31 LAB
ANION GAP SERPL CALCULATED.3IONS-SCNC: 8 MMOL/L (ref 4–13)
BUN SERPL-MCNC: 11 MG/DL (ref 5–25)
CALCIUM SERPL-MCNC: 9 MG/DL (ref 8.3–10.1)
CHLORIDE SERPL-SCNC: 107 MMOL/L (ref 100–108)
CHOLEST SERPL-MCNC: 143 MG/DL (ref 50–200)
CO2 SERPL-SCNC: 26 MMOL/L (ref 21–32)
CREAT SERPL-MCNC: 0.74 MG/DL (ref 0.6–1.3)
ERYTHROCYTE [DISTWIDTH] IN BLOOD BY AUTOMATED COUNT: 12.7 % (ref 11.6–15.1)
EST. AVERAGE GLUCOSE BLD GHB EST-MCNC: 105 MG/DL
GFR SERPL CREATININE-BSD FRML MDRD: 88 ML/MIN/1.73SQ M
GLUCOSE SERPL-MCNC: 87 MG/DL (ref 65–140)
HBA1C MFR BLD: 5.3 % (ref 4.2–6.3)
HCT VFR BLD AUTO: 40.3 % (ref 34.8–46.1)
HDLC SERPL-MCNC: 68 MG/DL (ref 40–60)
HGB BLD-MCNC: 13.3 G/DL (ref 11.5–15.4)
LDLC SERPL CALC-MCNC: 66 MG/DL (ref 0–100)
MCH RBC QN AUTO: 30.4 PG (ref 26.8–34.3)
MCHC RBC AUTO-ENTMCNC: 33 G/DL (ref 31.4–37.4)
MCV RBC AUTO: 92 FL (ref 82–98)
PLATELET # BLD AUTO: 230 THOUSANDS/UL (ref 149–390)
PMV BLD AUTO: 10.9 FL (ref 8.9–12.7)
POTASSIUM SERPL-SCNC: 3.9 MMOL/L (ref 3.5–5.3)
RBC # BLD AUTO: 4.38 MILLION/UL (ref 3.81–5.12)
SODIUM SERPL-SCNC: 141 MMOL/L (ref 136–145)
TRIGL SERPL-MCNC: 45 MG/DL
WBC # BLD AUTO: 5.45 THOUSAND/UL (ref 4.31–10.16)

## 2019-08-31 PROCEDURE — RECHECK: Performed by: PHYSICIAN ASSISTANT

## 2019-08-31 PROCEDURE — 99232 SBSQ HOSP IP/OBS MODERATE 35: CPT | Performed by: PHYSICIAN ASSISTANT

## 2019-08-31 PROCEDURE — 70551 MRI BRAIN STEM W/O DYE: CPT

## 2019-08-31 PROCEDURE — 83036 HEMOGLOBIN GLYCOSYLATED A1C: CPT | Performed by: PSYCHIATRY & NEUROLOGY

## 2019-08-31 PROCEDURE — 99239 HOSP IP/OBS DSCHRG MGMT >30: CPT | Performed by: INTERNAL MEDICINE

## 2019-08-31 PROCEDURE — NC001 PR NO CHARGE: Performed by: PHYSICIAN ASSISTANT

## 2019-08-31 PROCEDURE — G8978 MOBILITY CURRENT STATUS: HCPCS

## 2019-08-31 PROCEDURE — 80061 LIPID PANEL: CPT | Performed by: PSYCHIATRY & NEUROLOGY

## 2019-08-31 PROCEDURE — G8980 MOBILITY D/C STATUS: HCPCS

## 2019-08-31 PROCEDURE — 80048 BASIC METABOLIC PNL TOTAL CA: CPT | Performed by: PSYCHIATRY & NEUROLOGY

## 2019-08-31 PROCEDURE — 97163 PT EVAL HIGH COMPLEX 45 MIN: CPT

## 2019-08-31 PROCEDURE — 85027 COMPLETE CBC AUTOMATED: CPT | Performed by: NURSE PRACTITIONER

## 2019-08-31 RX ORDER — ATORVASTATIN CALCIUM 40 MG/1
40 TABLET, FILM COATED ORAL EVERY EVENING
Qty: 30 TABLET | Refills: 0 | Status: SHIPPED | OUTPATIENT
Start: 2019-08-31 | End: 2019-09-04 | Stop reason: SDUPTHER

## 2019-08-31 RX ORDER — ASPIRIN 81 MG/1
81 TABLET, CHEWABLE ORAL DAILY
Refills: 0 | Status: SHIPPED | OUTPATIENT
Start: 2019-09-01

## 2019-08-31 RX ADMIN — VITAMIN D, TAB 1000IU (100/BT) 1000 UNITS: 25 TAB at 08:35

## 2019-08-31 RX ADMIN — ASPIRIN 81 MG 81 MG: 81 TABLET ORAL at 08:35

## 2019-08-31 RX ADMIN — SODIUM CHLORIDE 100 ML/HR: 0.9 INJECTION, SOLUTION INTRAVENOUS at 05:00

## 2019-08-31 NOTE — PHYSICAL THERAPY NOTE
PHYSICAL THERAPY Evaluation NOTE    Patient Name: Kristi Yadav  [id number] Date: 8/31/2019     AGE:   60 y o  Mrn:   [id number]  ADMIT DX:  TIA (transient ischemic attack) [G45 9]  Stroke-like symptom [R29 90]    Past Medical History:   Diagnosis Date    Abnormal cervical Papanicolaou smear     Acne     LAST ASSESSED: 1/17/15    Asthma     Change in bowel habit     Depression     Family hx of colon cancer requiring screening colonoscopy     father colon cancer/ maternal grandfather colon cancer    Long term use of drug     LAST ASSESSED: 10/3/13    Migraine     PONV (postoperative nausea and vomiting)     Retinal tear     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     LAST ASSESSED: 3/9/13    Vascular headache      Length Of Stay: 1  PHYSICAL THERAPY EVALUATION :    08/31/19 7243   Note Type   Note type Eval only   Pain Assessment   Pain Assessment No/denies pain   Pain Score No Pain   Home Living   Type of Home House  (1 SH, 0 [place])   Home Layout One level   Bathroom Shower/Tub Tub/shower unit   Bathroom Toilet Standard   Bathroom Accessibility Accessible   Home Equipment   (NO AD )   Additional Comments Pt  lives with spouse in a 1 [place], 0 [place]   Prior Function   Level of [place] Independent with ADLs and functional mobility   Lives With Spouse   ADL Assistance Independent   IADLs Independent   Falls in the last 6 months 0   Vocational Full time employment   Comments PTA, Pt  reports INDEP with ADLs, IADLs and functional mobility without AD  (+)    Restrictions/Precautions   Other Precautions Fall Risk   General   Additional Pertinent History Pt is a 61 yo F who presents with 30 mins of Stroke-like Symptoms  Dx: TIA, comorbidities include Acne, Anxiety, Asthma, Chest pain, HLD, Osteopenia, Stress incontinence      Family/Caregiver Present Yes   Cognition   Overall Cognitive Status [facility]   Arousal/Participation Cooperative Orientation Level Oriented X4   Memory Within functional limits   Following Commands Follows all commands and directions without difficulty   Comments Pt  was identified with full name and birthdate   RLE Assessment   RLE Assessment   (grossly 4-/5)   LLE Assessment   LLE Assessment   (grossly 4-/5)   Coordination   Movements are Fluid and Coordinated 1   Sensation WFL   Light Touch   RLE Light Touch Grossly intact   LLE Light Touch Grossly intact   Bed Mobility   Supine to Sit 6  Modified independent   Additional items HOB elevated   Sit to Supine 6  Modified independent   Additional items HOB elevated   Transfers   Sit to Stand 6  Modified independent   Additional items Increased time required   Stand to Sit 6  Modified independent   Additional items Increased time required   Ambulation/Elevation   Gait pattern Narrow JAYLIN; Forward Flexion   Gait Assistance 6  Modified independent   Assistive Device None   Distance 400'x1   Balance   Static Sitting Good   Dynamic Sitting Fair +   Static Standing Fair +   Dynamic Standing Fair +   Ambulatory Fair +   Endurance Deficit   Endurance Deficit No   Activity Tolerance   Activity Tolerance Patient tolerated treatment well   Medical Staff Made Aware Spoke to [employer] CrNP   Nurse Made Aware Spoke to Rebecca Cesar, [address] [place] [address]   Assessment   Prognosis Good   Problem List Decreased strength;Decreased mobility   Assessment Pt is a 61 yo F who presents with 30 mins of Stroke-like Symptoms  Dx: TIA, order placed for PT eval and tx, w/ activity order of up and OOB as tolerated and ambulate patient  pt presents w/ comorbidities of Acne, Anxiety, Asthma, Chest pain, HLD, Osteopenia, Stress incontinence and personal factors of unable to perform dynamic tasks in community, inability to perform current job functions, inability to perform IADLs and inability to live alone  pt presents w/ weakness, impaired balance and gait deviations   these impairments are evident in findings from physical examination (weakness), mobility assessment (need for Mod I  assist w/ all phases of mobility when usually mobilizing independently, tolerance to only 400 feet of ambulation and need for cueing for mobility technique), and Barthel Index: 95/100  pt needed input for task focus and mobility technique  pt is at risk for falls due to physical and safety awareness deficits  pt's clinical presentation is unstable/unpredictable (evident in need for assist w/ all phases of mobility when usually mobilizing independently, tolerance to only 400 feet of ambulation, need for input for mobility technique, need for input for task focus and mobility technique and need for input for mobility technique/safety)  D/C PT at this time  discharge recommendation is for home w/ family support to reduce fall risk and maximize level of functional independence  Goals   Patient Goals to get home   Recommendation   Recommendation D/C PT;Home with family support   PT - OK to Discharge Yes   Additional Comments when medically appropriate   Barthel Index   Feeding 10   Bathing 5   Grooming Score 5   Dressing Score 10   Bladder Score 10   Bowels Score 10   Toilet Use Score 10   Transfers (Bed/Chair) Score 15   Mobility (Level Surface) Score 15   Stairs Score 5   Barthel Index Score 95     D/C PT  Recommend home with family support      Anila Duran, PT, DPT 8/31/2019

## 2019-08-31 NOTE — PROGRESS NOTES
Progress Note - Albertina Magaña 1960, 60 y o  female MRN: [id number]    Unit/Bed#:  Encounter: [id number]    Primary Care Provider: Alexi Jauregui MD   Date and time admitted to hospital: 8/30/2019  3:54 PM        * TIA (transient ischemic attack)  Assessment & Plan  · Patient presented to the ED for sudden onset left arm numbness, slurred speech, left-sided facial droop lasting for approximately 30 minutes  · By the time she arrived to the ED she was noted to have numbness remaining in her fingers but according to family was still presenting with slurred speech  · This patient has past medical history of hyperlipidemia but is not on a statin  · She denied nausea, dizziness, headache, visual changes, chest pain or shortness of breath  · At time of admission she has an NIH stroke scale score of 0 and appears to be at her baseline according to her and her family, slurred speech, left-sided weakness, and facial droop have resolved prior to admission on 8/30  No new presentation of symptoms overnight  · CT head done in the ED showed no acute intracranial processes or abnormalities, CTA head and neck was unremarkable, chest x-ray showed no acute cardiopulmonary processes, and EKG showed normal sinus rhythm of 90 beats per minute  · Patient has been admitted to the ICU under stroke protocol, MRI head/neck and echocardiogram pending  · Started aspirin and statin  · Continued neuro checks  · Neurology on consult, appreciate the recommendations      Disposition: Transfer to telemetry      ______________________________________________________________________  Chief Complaint: "My head feels foggy"        HPI/24hr events: Patient complained that her head felt "foggy" but stated that all of her previous symptoms were gone  She had no return of her previous symptoms overnight  MRI head/neck and echocardiogram pending  No significant events overnight          Review of Systems   Constitutional: Negative  HENT: Negative  Eyes: Negative  Respiratory: Negative  Cardiovascular: Negative  Gastrointestinal: Negative  Endocrine: Negative  Genitourinary: Negative  Musculoskeletal: Negative  Allergic/Immunologic: Negative  Neurological: Negative  "Head feels foggy"    Hematological: Negative  Psychiatric/Behavioral: Negative for agitation, behavioral problems and confusion  The patient is nervous/anxious  ______________________________________________________________________  Temperature:   Temp (24hrs), Av 2 °F (36 8 °C), Min:97 8 °F (36 6 °C), Max:98 6 °F (37 °C)    Current Temperature: 97 9 °F (36 6 °C)    Vitals:    19 0000 19 0100 19 0200 19 0300   BP: 94/53 94/58 100/53 109/52   BP Location:    Right arm   Pulse: 69 69 66 63   Resp: 17 (!) 31 18 16   Temp:    97 9 °F (36 6 °C)   TempSrc:    Oral   SpO2: 95% 98% 95% 96%   Weight:       Height:                  Weights:   IBW: 54 7 kg    Body mass index is 24 11 kg/m²  Weight (last 2 days)     Date/Time   Weight    19 1821   63 7 (140 43)    19 16:17:18   63 (138 89)            Height: 5' 4" (162 6 cm)     SpO2: SpO2: 96 %  ______________________________________________________________________  Physical Exam:     Physical Exam   Constitutional: She is oriented to person, place, and time  She appears well-developed and well-nourished  No distress  HENT:   Head: Normocephalic and atraumatic  Right Ear: External ear normal    Left Ear: External ear normal    Nose: Nose normal    Eyes: Pupils are equal, round, and reactive to light  Conjunctivae and EOM are normal  Right eye exhibits no discharge  Left eye exhibits no discharge  No scleral icterus  Neck: Normal range of motion  Neck supple  No JVD present  No tracheal deviation present  Cardiovascular: Normal rate, regular rhythm, normal heart sounds and intact distal pulses  Exam reveals no gallop and no friction rub  Pulmonary/Chest: Effort normal and breath sounds normal  No stridor  No respiratory distress  She has no wheezes  She has no rales  She exhibits no tenderness  Abdominal: Soft  Bowel sounds are normal  She exhibits no distension and no mass  There is no tenderness  There is no rebound and no guarding  Musculoskeletal: Normal range of motion  She exhibits no edema, tenderness or deformity  Neurological: She is alert and oriented to person, place, and time  No cranial nerve deficit or sensory deficit  She exhibits normal muscle tone  Coordination normal    Skin: Skin is dry  Capillary refill takes less than 2 seconds  No rash noted  She is not diaphoretic  No erythema  No pallor  Psychiatric: Her mood appears anxious  Her affect is not angry and not inappropriate  Her speech is not delayed, not tangential and not slurred  She is not agitated, not aggressive, not slowed and not withdrawn  Cognition and memory are not impaired  She does not express impulsivity or inappropriate judgment  She is communicative  She exhibits normal recent memory  ______________________________________________________________________  Intake and Outputs:  I/O       08/29 0701 - 08/30 0700 08/30 0701 - 08/31 0700    I V  (mL/kg)  543 3 (8 5)    Total Intake(mL/kg)  543 3 (8 5)    Net  +543 3              Nutrition:        Diet Orders   (From admission, onward)             Start     Ordered    08/30/19 1901  Room Service  Once     Question:  Type of Service  Answer:  Room Service - Appropriate with Assistance    08/30/19 1900 08/30/19 1822  Diet Regular; Regular House  Diet effective now     Question Answer Comment   Diet Type Regular    Regular Regular House    RD to adjust diet per protocol?  Yes        08/30/19 1822                Labs:   Results from last 7 days   Lab Units 08/31/19  0517 08/30/19  1604 08/30/19  1559   WBC Thousand/uL 5 45 7 56  --    HEMOGLOBIN g/dL 13 3 14 5  --    I STAT HEMOGLOBIN g/dl  --   --  14 6 HEMATOCRIT % 40 3 44 1  --    HEMATOCRIT, ISTAT %  --   --  43   PLATELETS Thousands/uL 230 258  --       Results from last 7 days   Lab Units 08/31/19  0517 08/30/19  1604 08/30/19  1559   SODIUM mmol/L 141 137  --    POTASSIUM mmol/L 3 9 3 9  --    CHLORIDE mmol/L 107 101  --    CO2 mmol/L 26 26  --    CO2, I-STAT mmol/L  --   --  26   BUN mg/dL 11 14  --    CREATININE mg/dL 0 74 1 16  --    CALCIUM mg/dL 9 0 8 9  --    GLUCOSE RANDOM mg/dL 87 102  --               Results from last 7 days   Lab Units 08/30/19  1604   INR  1 08   PTT seconds 26     Imaging:  I have personally reviewed pertinent films in PACS    Micro:  No results found for: Rambo Salazar, WOUNDCULT, SPUTUMCULTUR  Allergies: Allergies   Allergen Reactions    Azithromycin Other (See Comments)     Pt doesn't remember     Other      anesthesia    Penicillins Rash     Medications:   Scheduled Meds:    Current Facility-Administered Medications:  aspirin 81 mg Oral Daily Ras Danielle MD    atorvastatin 40 mg Oral QPM Ras Danielle MD    cholecalciferol 1,000 Units Oral Daily Ras Danielle MD    sodium chloride 100 mL/hr Intravenous Continuous Ras Danielle MD Last Rate: 100 mL/hr (08/30/19 1834)     Continuous Infusions:    sodium chloride 100 mL/hr Last Rate: 100 mL/hr (08/30/19 1834)     PRN Meds:       Invasive lines and devices: Invasive Devices     Peripheral Intravenous Line            Peripheral IV 08/30/19 Left Antecubital 1 day    Peripheral IV 08/30/19 Right Antecubital less than 1 day                   Counseling / Coordination of Care  Total time spent today 30 minutes  Greater than 50% of total time was spent with the patient and / or family counseling and / or coordination of care  Code Status: Level 1 - Full Code    Portions of the record may have been created with voice recognition software    Occasional wrong word or "sound a like" substitutions may have occurred due to the inherent limitations of voice recognition software  Read the chart carefully and recognize, using context, where substitutions have occurred      Aroldo Thomason PA-C

## 2019-08-31 NOTE — SPEECH THERAPY NOTE
Speech Language/Pathology    Consult received for speech/swallow eval on stroke pathway  Pt is a 59 y/o female who was admitted for L arm numbness, facial numbness and difficulty finding words  CXR, CTA, and CT-Head were all negative for acute abnormality  Since admission her symptoms have completely resolved as per pt report, nursing report and documentation from physicians  Pt passed nsg swallow screen; tolerating regular diet w/o s/s dysphagia or aspiration  No speech/language deficits reported at this time as they have completely resolved  NIH score is 0  No need for formal speech/swallow eval at this time according to conversation with nurse this morning in pt's room  Reconsult if needed      Jana Armijo, SLP

## 2019-08-31 NOTE — PLAN OF CARE
Problem: PHYSICAL THERAPY ADULT  Goal: Performs mobility at highest level of function for planned discharge setting  See evaluation for individualized goals  Description        D/C PT  See flowsheet documentation for full assessment, interventions and recommendations  Outcome: Completed  Note:   Prognosis: Good  Problem List: Decreased strength, Decreased mobility  Assessment: Pt is a 61 yo F who presents with 30 mins of Stroke-like Symptoms  Dx: TIA, order placed for PT eval and tx, w/ activity order of up and OOB as tolerated and ambulate patient  pt presents w/ comorbidities of Acne, Anxiety, Asthma, Chest pain, HLD, Osteopenia, Stress incontinence and personal factors of unable to perform dynamic tasks in community, inability to perform current job functions, inability to perform IADLs and inability to live alone  pt presents w/ weakness, impaired balance and gait deviations  these impairments are evident in findings from physical examination (weakness), mobility assessment (need for Mod I  assist w/ all phases of mobility when usually mobilizing independently, tolerance to only 400 feet of ambulation and need for cueing for mobility technique), and Barthel Index: 95/100  pt needed input for task focus and mobility technique  pt is at risk for falls due to physical and safety awareness deficits  pt's clinical presentation is unstable/unpredictable (evident in need for assist w/ all phases of mobility when usually mobilizing independently, tolerance to only 400 feet of ambulation, need for input for mobility technique, need for input for task focus and mobility technique and need for input for mobility technique/safety)  D/C PT at this time  discharge recommendation is for home w/ family support to reduce fall risk and maximize level of functional independence  Recommendation: D/C PT, Home with family support     PT - OK to Discharge: Yes    See flowsheet documentation for full assessment

## 2019-08-31 NOTE — DISCHARGE INSTR - AVS FIRST PAGE
Follow-up with your primary care physician to schedule an outpatient echocardiogram in the next 1-2 weeks

## 2019-08-31 NOTE — ASSESSMENT & PLAN NOTE
· Patient presented to the ED for sudden onset left arm numbness, slurred speech, left-sided facial droop lasting for approximately 30 minutes  · By the time she arrived to the ED she was noted to have numbness remaining in her fingers but according to family was still presenting with slurred speech  · This patient has past medical history of hyperlipidemia but is not on a statin  · She denied nausea, dizziness, headache, visual changes, chest pain or shortness of breath  · At time of admission she has an NIH stroke scale score of 0 and appears to be at her baseline according to her and her family, slurred speech, left-sided weakness, and facial droop have resolved prior to admission on 8/30  No new presentation of symptoms overnight  · CT head done in the ED showed no acute intracranial processes or abnormalities, CTA head and neck was unremarkable, chest x-ray showed no acute cardiopulmonary processes, and EKG showed normal sinus rhythm of 90 beats per minute  · Patient has been admitted to the ICU under stroke protocol, MRI head/neck and echocardiogram pending     · Started aspirin and statin  · Continued neuro checks  · Neurology on consult, appreciate the recommendations

## 2019-08-31 NOTE — NURSING NOTE
AVS reviewed with patient and spouse  Both verbalized understanding of material  D/C via ambulation to home with spouse present

## 2019-08-31 NOTE — PLAN OF CARE
Problem: PAIN - ADULT  Goal: Verbalizes/displays adequate comfort level or baseline comfort level  Description  Interventions:  - Encourage patient to monitor pain and request assistance  - Assess pain using appropriate pain scale  - Administer analgesics based on type and severity of pain and evaluate response  - Implement non-pharmacological measures as appropriate and evaluate response  - Consider cultural and social influences on pain and pain management  - Notify physician/advanced practitioner if interventions unsuccessful or patient reports new pain  Outcome: Progressing     Problem: INFECTION - ADULT  Goal: Absence or prevention of progression during hospitalization  Description  INTERVENTIONS:  - Assess and monitor for signs and symptoms of infection  - Monitor lab/diagnostic results  - Monitor all insertion sites, i e  indwelling lines, tubes, and drains  - Monitor endotracheal if appropriate and nasal secretions for changes in amount and color  - [place] appropriate cooling/warming therapies per order  - Administer medications as ordered  - Instruct and encourage patient and family to use good hand hygiene technique  - Identify and instruct in appropriate isolation precautions for identified infection/condition  Outcome: Progressing  Goal: Absence of fever/infection during neutropenic period  Description  INTERVENTIONS:  - Monitor WBC    Outcome: Progressing     Problem: SAFETY ADULT  Goal: Patient will remain free of falls  Description  INTERVENTIONS:  - Assess patient frequently for physical needs  -  Identify cognitive and physical deficits and behaviors that affect risk of falls    -  [place] fall precautions as indicated by assessment   - Educate patient/family on patient safety including physical limitations  - Instruct patient to call for assistance with activity based on assessment  - Modify environment to reduce risk of injury  - Consider OT/PT consult to assist with strengthening/mobility  Outcome: Progressing  Goal: Maintain or return to baseline ADL function  Description  INTERVENTIONS:  -  Assess patient's ability to carry out ADLs; assess patient's baseline for ADL function and identify physical deficits which impact ability to perform ADLs (bathing, care of mouth/teeth, toileting, grooming, dressing, etc )  - Assess/evaluate cause of self-care deficits   - Assess range of motion  - Assess patient's mobility; develop plan if impaired  - Assess patient's need for assistive devices and provide as appropriate  - Encourage maximum independence but intervene and supervise when necessary  - Involve family in performance of ADLs  - Assess for home care needs following discharge   - Consider OT consult to assist with ADL evaluation and planning for discharge  - Provide patient education as appropriate  Outcome: Progressing  Goal: Maintain or return mobility status to optimal level  Description  INTERVENTIONS:  - Assess patient's baseline mobility status (ambulation, transfers, stairs, etc )    - Identify cognitive and physical deficits and behaviors that affect mobility  - Identify mobility aids required to assist with transfers and/or ambulation (gait belt, sit-to-stand, lift, walker, cane, etc )  - [place] fall precautions as indicated by assessment  - Record patient progress and toleration of activity level on Mobility SBAR; progress patient to next Phase/Stage  - Instruct patient to call for assistance with activity based on assessment  - Consider rehabilitation consult to assist with strengthening/weightbearing, etc   Outcome: Progressing     Problem: DISCHARGE PLANNING  Goal: Discharge to home or other facility with appropriate resources  Description  INTERVENTIONS:  - Identify barriers to discharge w/patient and caregiver  - Arrange for needed discharge resources and transportation as appropriate  - Identify discharge learning needs (meds, wound care, etc )  - Arrange for interpretive services to assist at discharge as needed  - Refer to Case Management Department for coordinating discharge planning if the patient needs post-hospital services based on physician/advanced practitioner order or complex needs related to functional status, cognitive ability, or social support system  Outcome: Progressing     Problem: Knowledge Deficit  Goal: Patient/family/caregiver demonstrates understanding of disease process, treatment plan, medications, and discharge instructions  Description  Complete learning assessment and assess knowledge base  Interventions:  - Provide teaching at level of understanding  - Provide teaching via preferred learning methods  Outcome: Progressing     Problem: Neurological Deficit  Goal: Neurological status is stable or improving  Description  Interventions:  - Monitor and assess patient's level of consciousness, motor function, sensory function, and level of assistance needed for ADLs  - Monitor and report changes from baseline  Collaborate with interdisciplinary team to initiate plan and implement interventions as ordered  - Provide and maintain a safe environment  - Consider seizure precautions  - Consider fall precautions  - Consider aspiration precautions  - Consider bleeding precautions  Outcome: Progressing     Problem: Activity Intolerance/Impaired Mobility  Goal: Mobility/activity is maintained at optimum level for patient  Description  Interventions:  - Assess and monitor patient  barriers to mobility and need for assistive/adaptive devices  - Assess patient's emotional response to limitations  - Collaborate with interdisciplinary team and initiate plans and interventions as ordered  - Encourage independent activity per ability   - Maintain proper body alignment  - Perform active/passive rom as tolerated/ordered    - Plan activities to conserve energy   - Turn patient as appropriate  Outcome: Progressing     Problem: Communication Impairment  Goal: Ability to express needs and understand communication  Description  Assess patient's communication skills and ability to understand information  Patient will demonstrate use of effective communication techniques, alternative methods of communication and understanding even if not able to speak  - Encourage communication and provide alternate methods of communication as needed  - Collaborate with case management/ for discharge needs  - Include patient/family/caregiver in decisions related to communication  Outcome: Progressing     Problem: Potential for Aspiration  Goal: Non-ventilated patient's risk of aspiration is minimized  Description  Assess and monitor vital signs, respiratory status, and labs (WBC)  Monitor for signs of aspiration (tachypnea, cough, rales, wheezing, cyanosis, fever)  - Assess and monitor patient's ability to swallow  - Place patient up in chair to eat if possible  - HOB up at 90 degrees to eat if unable to get patient up into chair   - Supervise patient during oral intake  - Instruct patient/ family to take small bites  - Instruct patient/ family to take small single sips when taking liquids  - Follow patient-specific strategies generated by speech pathologist   Outcome: Progressing  Goal: Ventilated patient's risk of aspiration is minimized  Description  Assess and monitor vital signs, respiratory status, airway cuff pressure, and labs (WBC)  Monitor for signs of aspiration (tachypnea, cough, rales, wheezing, cyanosis, fever)  - Elevate head of bed 30 degrees if patient has tube feeding   - Monitor tube feeding  Outcome: Progressing     Problem: Nutrition  Goal: Nutrition/Hydration status is improving  Description  Monitor and assess patient's nutrition/hydration status for malnutrition (ex- brittle hair, bruises, dry skin, pale skin and conjunctiva, muscle wasting, smooth red tongue, and disorientation)   Collaborate with interdisciplinary team and initiate plan and interventions as ordered  Monitor patient's weight and dietary intake as ordered or per policy  Utilize nutrition screening tool and intervene per policy  Determine patient's food preferences and provide high-protein, high-caloric foods as appropriate  - Assist patient with eating   - Allow adequate time for meals   - Encourage patient to take dietary supplement as ordered  - Collaborate with clinical nutritionist   - Include patient/family/caregiver in decisions related to nutrition    Outcome: Progressing

## 2019-08-31 NOTE — PROGRESS NOTES
Neurology - Progress Note  Annabel Alicea 60 y o  female MRN: [id number]  Unit/Bed#:  Encounter: [id number]    Assessment:  No evidence of acute infarct  Still strong suspicion for a nonorganic stress reaction as there is no clear athero on intra/extracranial vasculature  Event very brief about 5 min, no headache or palpitations with this, no leg involvment  Also no involuntary activity  Plan:  No further inpt recs  Stable for discharge from neuro standpoint  ROS:  Per 12 point review, negative currently  Vitals: Blood pressure 100/55, pulse 74, temperature 99 °F (37 2 °C), temperature source Oral, resp  rate 18, height 5' 4" (1 626 m), weight 63 7 kg (140 lb 6 9 oz), SpO2 96 %, not currently breastfeeding  ,Body mass index is 24 11 kg/m²  Physical Exam:    General appearance: alert, appears stated age and cooperative  Head: Normocephalic, without obvious abnormality, atraumatic    Neurologic: Mental status: Alert, orientedX3, thought content appropriate  Cn 2-12 intact  Motor: 5 power ue/le bilat  Sensory: light touch intact throughout              Lab, Imaging and other studies: I have personally reviewed pertinent reports  Counseling / Coordination of Care  Total 25 min spent evaluating patient and counseling her and  on differential and the workup

## 2019-08-31 NOTE — DISCHARGE SUMMARY
Discharge Summary - [facility] Internal Medicine    Patient Information: Dhiraj Taylor 60 y o  female MRN: [id number]  Unit/Bed#:  Encounter: [id number]    Discharging Physician / Practitioner: Bipin Sánchez MD  PCP: Essence Blakely MD  Admission Date: 8/30/2019  Discharge Date: 08/31/19    Reason for Admission:  TIA    Discharge Diagnoses:     Principal Problem:    TIA (transient ischemic attack)    Consultations During Hospital Stay:  · Neurology - discussed with Dr Alexis Cannon    Procedures Performed:   · None    Significant findings:  · None    Hospital Course:   Dhiraj Taylor is a 60 y o  female patient who originally presented to the hospital on 8/30/2019 due to stroke-like symptoms  Patient presented with what was reported to be left arm numbness with slurred speech and left facial droop  Symptoms said to have lasted approximately < 30 minutes and subsequently resolved  By the time of admission, her neurologic evaluation was unremarkable  She had a stroke workup done which included CT of the head, CTA of the head and neck, MRI of the brain all of which were negative for acute neurologic disease  She was evaluated by Neurology who suspected non organic stress reaction as there was no clear atherosclerotic disease noted on intra and extracranial vasculature  The patient had no further neurologic symptoms, she was placed on telemetry overnight which was unremarkable  Patient was initially anticipated to require greater than 2 midnights for workup and management of stroke-like symptoms, however with resolution and non recurrence of symptoms as well as negative workup, she was cleared from the neurologic standpoint for discharge  She is recommended to follow up outpatient for an echocardiogram   No further inpatient needs were identified      Condition at Discharge: good     Discharge Day Visit / Exam:   See same day progress note for details     Discharge instructions/Information to patient and family:   See after visit summary for information provided to patient and family  Provisions for Follow-Up Care:  See after visit summary for information related to follow-up care and any pertinent home health orders  Disposition: Home    Patient and spouse updated at the bedside  All questions answered    Discharge Statement:  I spent >30 minutes discharging the patient  This time was spent on the day of discharge  I had direct contact with the patient on the day of discharge  Greater than 50% of the total time was spent examining patient, answering all patient questions, arranging and discussing plan of care with patient as well as directly providing post-discharge instructions  Additional time then spent on discharge activities  Discharge Medications:  See after visit summary for reconciled discharge medications provided to patient and family  ** Please Note: Dragon 360 Dictation voice to text software may have been used in the creation of this document   **

## 2019-08-31 NOTE — UTILIZATION REVIEW
Initial Clinical Review    Admission: Date/Time/Statement: Inpatient Admission Orders (From admission, onward)     Ordered        08/30/19 1652  Inpatient Admission (expected length of stay for this patient Order details is greater than two midnights)  Once                   Orders Placed This Encounter   Procedures    Inpatient Admission (expected length of stay for this patient Order details is greater than two midnights)     Standing Status:   Standing     Number of Occurrences:   1     Order Specific Question:   Admitting Physician     Answer:   Soco Muñoz [[id number]]     Order Specific Question:   Level of Care     Answer:   Level 2 Stepdown / HOT [14]     Order Specific Question:   Estimated length of stay     Answer:   More than 2 Midnights     Order Specific Question:   Certification     Answer:   I certify that inpatient services are medically necessary for this patient for a duration of greater than two midnights  See H&P and MD Progress Notes for additional information about the patient's course of treatment  ED Arrival Information     Expected Arrival Acuity Means of Arrival Escorted By Service Admission Type    8/30/2019 8/30/2019 15:54 Immediate Walk-In Self General Medicine Emergency    Arrival Complaint    Neurological abnormality        Chief Complaint   Patient presents with    STROKE Alert     Left facial and arm numbness with difficulty speaking at 1500  Assessment/Plan: 60 y o  Female presents to ED from home admitted as Inpatient due to TIA with hypotension  Patient was att work when spontaneously she experienced difficulty getting words out with left arm numbness that started at the same time  Symptoms persisted for 5 minutes at which time she spoke with slurred speech  She presented to ED with most symptoms resolved, however she was noted for left-sided facial drooping with slurred speech  She was complaining that her left fingers were numb   As admission progressed, most symptoms resolved except for numbness in 1 or 2 fingers  Admitted with continued Neuro checks, Neurology consulted  Attending documents: SBP noted to be in 90s-hold Aldactone & begin IV fluids  8/30 Neurology note: acute onset left facial droop, left upper extremity numbness/tingling, and slurred speech  NIHSS of 1  CT head and CTA head and neck unremarkable for acute intracranial abnormalities or large vessel occlusions  Patient was not an IV tPA candidate due to resolving symptoms and low NIH  Strong suspicion for TIA, cannot completely rule out stroke at this time  Will admit on stroke pathway and obtain MRI brain      TPA Decision: Patient not a TPA candidate  Symptoms resolved/clearly non disabling    Plan:   -Admit stroke pathway  -MRI brain pending  -Echo pending   -Pending: Hemoglobin A1c, Lipid panel  -Recommend initiating ASA 81 mg daily  -Recommend initiating atorvastatin 40 mg daily  -Telemetry  -PT/OT/speech  -Allow permissive hypertension, labetalol if SBP >200  -Frequent neuro checks  -Medical management per primary team  -Continue supportive care per primary team, notify with changes    ED Triage Vitals   Temperature Pulse Respirations Blood Pressure SpO2   08/30/19 1630 08/30/19 1619 08/30/19 1619 08/30/19 1619 08/30/19 1619   98 3 °F (36 8 °C) 105 16 130/62 100 %      Temp Source Heart Rate Source Patient Position - Orthostatic VS BP Location FiO2 (%)   08/30/19 1630 08/30/19 1630 08/30/19 2300 08/30/19 2300 --   Oral Monitor Lying Right arm       Pain Score       08/30/19 1645       No Pain        Wt Readings from Last 1 Encounters:   08/30/19 63 7 kg (140 lb 6 9 oz)     Additional Vital Signs:   08/31/19 1058  99 °F (37 2 °C)  74  18  100/55  75  96 %  None (Room air)  Sitting   08/31/19 0659  98 7 °F (37 1 °C)  73  19  91/53  68  100 %       08/31/19 0500    63  16  97/57  72  97 %       08/31/19 0400    64  16  93/50  65  96 %       08/31/19 0300  97 9 °F (36 6 °C)  63  16  109/52  75 96 %  None (Room air)  Lying   08/31/19 0200    66  18  100/53  73  95 %       08/31/19 0100    69  31Abnormal   94/58  71  98 %       08/31/19 0000    69  17  94/53  68  95 %       08/30/19 2300  98 6 °F (37 °C)  76  20  94/57  71  98 %  None (Room air)  Lying   08/30/19 2100    79  21  94/62  73  97 %       08/30/19 2000    82  23Abnormal   123/73  91         08/30/19 1900  97 8 °F (36 6 °C)  89  14  96/69  78  97 %       08/30/19 1830    95  20  110/57  76  98 %       08/30/19 1821  98 3 °F (36 8 °C)  87  22  102/63  78  98 %  None (Room air)     08/30/19 1800    93  14  128/61    97 %       08/30/19 1730    93  14  142/65    96 %       08/30/19 1700    90  12  146/63    96 %       08/30/19 1645    90  14  148/56    96 %       08/30/19 1630  98 3 °F (36 8 °C)  98  14  134/61    98 %       08/30/19 16:19:13    105  16  130/62    100 %          Weights (last 14 days)     Date/Time  Weight  Height   08/30/19 1821  63 7 kg (140 lb 6 9 oz)  5' 4" (1 626 m)   08/30/19 16:17:18  63 kg (138 lb 14 2 oz)         Pertinent Labs/Diagnostic Test Results:   8/30 CXR: no acute disease  8/30Ct Stroke Alert Brain: Impression: No acute intracranial CT abnormality  8/30 Cta Stroke Alert (head/neck): Impression: 1  No intracranial branching large vessel occlusion/critical stenosis  Unremarkable CT angiogram of the brain  2   Unremarkable CT angiogram of the neck      8/30 EKG:  Normal sinus    Results from last 7 days   Lab Units 08/31/19  0517 08/30/19  1604 08/30/19  1559   WBC Thousand/uL 5 45 7 56  --    HEMOGLOBIN g/dL 13 3 14 5  --    I STAT HEMOGLOBIN g/dl  --   --  14 6   HEMATOCRIT % 40 3 44 1  --    HEMATOCRIT, ISTAT %  --   --  43   PLATELETS Thousands/uL 230 258  --          Results from last 7 days   Lab Units 08/31/19  0517 08/30/19  1604 08/30/19  1559   SODIUM mmol/L 141 137  --    POTASSIUM mmol/L 3 9 3 9  --    CHLORIDE mmol/L 107 101  --    CO2 mmol/L 26 26  -- CO2, I-STAT mmol/L  --   --  26   AGAP mmol/L  --   --  14*   ANION GAP mmol/L 8 10  --    BUN mg/dL 11 14  --    CREATININE mg/dL 0 74 1 16  --    EGFR ml/min/1 73sq m 88 51 49   CALCIUM mg/dL 9 0 8 9  --    CALCIUM, IONIZED, ISTAT mmol/L  --   --  1 20         Results from last 7 days   Lab Units 08/30/19  1600   POC GLUCOSE mg/dl 112     Results from last 7 days   Lab Units 08/31/19  0517 08/30/19  1604   GLUCOSE RANDOM mg/dL 87 102         Results from last 7 days   Lab Units 08/31/19  0517   HEMOGLOBIN A1C % 5 3   EAG mg/dl 105          Results from last 7 days   Lab Units 08/30/19  1604   PROTIME seconds 13 4   INR  1 08   PTT seconds 26           Past Medical History:   Diagnosis Date    Abnormal cervical Papanicolaou smear     Acne     LAST ASSESSED: 1/17/15    Asthma     Change in bowel habit     Depression     Family hx of colon cancer requiring screening colonoscopy     father colon cancer/ maternal grandfather colon cancer    Long term use of drug     LAST ASSESSED: 10/3/13    Migraine     PONV (postoperative nausea and vomiting)     Retinal tear     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     LAST ASSESSED: 3/9/13    Vascular headache      Present on Admission:   TIA (transient ischemic attack)      Admitting Diagnosis: TIA (transient ischemic attack) [G45 9]  Stroke-like symptom [R29 90]  Age/Sex: 60 y o  female  Admission Orders:    Current Facility-Administered Medications:  aspirin 81 mg Oral Daily   atorvastatin 40 mg Oral QPM   cholecalciferol 1,000 Units Oral Daily   Telemetry-48 hr  Peripheral IV  Neuro checks Q1hr x4hr; Q2hr x8hr; Q4hr x72hr;  Vitals Q1hr x4hr; Q2hr x4hrs; Q4hr x72hr; Q8hr if stable  Nursing dysphagia assessment prior to diet  NPO  Assess NIH stroke scale on admission & DC-every 24 hr for 2 days  PT/OT/SPEECH assess & treat  MRI  echo  IP CONSULT TO NEUROLOGY  IP CONSULT TO CASE MANAGEMENT  IP CONSULT TO [email or web address] Utilization Review Department  Phone: [phone]; Fax [phone]  [email or web address]  ATTENTION: Please call with any questions or concerns to [phone]  and carefully listen to the prompts so that you are directed to the right person  Send all requests for admission clinical reviews, approved or denied determinations and any other requests to fax [phone]   All voicemails are confidential

## 2019-09-01 ENCOUNTER — TELEPHONE (OUTPATIENT)
Dept: OTHER | Facility: OTHER | Age: 59
End: 2019-09-01

## 2019-09-01 NOTE — TELEPHONE ENCOUNTER
Karl Smith 1960  CONFIDENTIALTY NOTICE: This fax transmission is intended only for the addressee  It contains information that is legally privileged,  confidential or otherwise protected from use or disclosure  If you are not the intended recipient, you are strictly prohibited from reviewing,  disclosing, copying using or disseminating any of this information or taking any action in reliance on or regarding this information  If you have  received this fax in error, please notify us immediately by telephone so that we can arrange for its return to us  Page:   Call Id: [id number]  Health Call  Standard Call Report  Health Call  Patient Name: Karl Smith  Gender: Female  : 1960  Age: 60 Y 6 M 5 D  Return Phone  Number: [phone] (Home)  Address: [facility]/Zip: [facility] [address]  Practice Name: [address] [place] [address]  Practice Charged:  Physician:  [address] [place] [address] Name:  Relationship To  Patient: Self  Return Phone Number: [phone] (Home)  Presenting Problem: "I was discharged from the hospital  and told to follow up with my PCP on  Tuesday, however I am still not feeling  great  When I came home last night I  slept from 2000 to 0900 this morning I  just don't feel right "  Service Type: Triage  Charged Service 1: Dannielle PATEL  38  Name and  Number:  Nurse Assessment  Protocols  Protocol Title Nurse Date/Time  Disp  Time Disposition Final User  2019 12:44:45 PM RN Triaged Yes Hollice Collet  Comments  User: Ruiz Fuchs RN Date/Time: 2019 12:44:39 PM  This patient got discharge yesterday after being admitted Friday to ICU  She was Dx with a TIA to follow up with her PCP  She  is calling now to get an appt  on Tuesday morning before she returns to work  She has no new Sxs of a TIA or Stroke  If they  do return, she needs to head right to the ER  Patient understands this  I gave her an appt  for Tuesday at 1000 to see Dr Karl Altamirano  before she returns to work   I encouraged her to rest today since she just got out of the hospital yesterday

## 2019-09-01 NOTE — PROGRESS NOTES
[facility]        NAME: Roman Meyer is a 60 y o  female  : 1960    MRN: [id number]  DATE: 2019  TIME: 12:24 PM    Assessment and Plan   Numbness and tingling in left hand [R20 0, R20 2]  1  Numbness and tingling in left hand     2  Numbness and tingling of left side of face           Patient Instructions       Follow up with PCP in 3-5 days  Proceed to  ER if symptoms worsen  Chief Complaint     Chief Complaint   Patient presents with    Numbness     left hand numbness, Difficulty getting words out  History of Present Illness       60years old female with no significant past medical history had a sudden onset numbness tingling sensation in left hand and left face 30 minutes ago  She reports when stand up from a chair, she suddenly felt tingling numbness sensation in her whole left hand  She also reports left half face numbness, difficult to speak  She denies vision change, symptom in lower extremities  She also reports anxiety and fatigue  She denies taking medications such as blood thinner or birth control medicine  She denies previous similar episode  Review of Systems   Review of Systems   Constitutional: Positive for fatigue  HENT:        Numbness sensation in the left side of the face   Eyes: Negative  Respiratory: Negative  Cardiovascular: Positive for palpitations  Gastrointestinal: Negative  Endocrine: Negative  Musculoskeletal: Negative  Neurological: Positive for numbness  Psychiatric/Behavioral: The patient is nervous/anxious            Current Medications       Current Outpatient Medications:     spironolactone (ALDACTONE) 50 mg tablet, 2 (two) times a day , Disp: , Rfl:     aspirin 81 mg chewable tablet, Chew 1 tablet (81 mg total) daily, Disp: , Rfl: 0    atorvastatin (LIPITOR) 40 mg tablet, Take 1 tablet (40 mg total) by mouth every evening, Disp: 30 tablet, Rfl: 0    cholecalciferol (VITAMIN D3) 1,000 units tablet, Take 1,000 Units by mouth daily, Disp: , Rfl:     clindamycin (CLEOCIN T) 1 %, 1 pad 2 (two) times a day , Disp: , Rfl:     doxycycline hyclate (VIBRAMYCIN) 50 mg capsule, 50 mg daily , Disp: , Rfl:     ergocalciferol (VITAMIN D2) 50,000 units, , Disp: , Rfl:     ergocalciferol (VITAMIN D2) 50,000 units, TAKE ONE CAPSULE BY MOUTH MONTHLY, Disp: 3 capsule, Rfl: 0  No current facility-administered medications for this visit  Current Allergies     Allergies as of 08/30/2019 - Reviewed 08/30/2019   Allergen Reaction Noted    Azithromycin Other (See Comments) 03/09/2013    Other  08/29/2014    Penicillins Rash 07/14/2017            The following portions of the patient's history were reviewed and updated as appropriate: allergies, current medications, past family history, past medical history, past social history, past surgical history and problem list      Past Medical History:   Diagnosis Date    Abnormal cervical Papanicolaou smear     Acne     LAST ASSESSED: 1/17/15    Asthma     Change in bowel habit     Depression     Family hx of colon cancer requiring screening colonoscopy     father colon cancer/ maternal grandfather colon cancer    Long term use of drug     LAST ASSESSED: 10/3/13    Migraine     PONV (postoperative nausea and vomiting)     Retinal tear     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     LAST ASSESSED: 3/9/13    Vascular headache        Past Surgical History:   Procedure Laterality Date    APPENDECTOMY      BREAST EXCISIONAL BIOPSY Left     BREAST LUMPECTOMY      LOCATION    COLONOSCOPY      DILATION AND CURETTAGE OF UTERUS      INCISIONAL BREAST BIOPSY      SD COLONOSCOPY FLX DX W/COLLJ SPEC WHEN PFRMD N/A 7/17/2017    Procedure: COLONOSCOPY;  Surgeon: Donna Dietz MD;  Location: [facility];   Service: Colorectal    RETINOPATHY SURGERY      retinal tear repair x3       Family History   Problem Relation Age of Onset    Breast cancer Mother 73    Colon cancer Father 61    Brain cancer Sister     Breast cancer Maternal Grandmother 52    Coronary artery disease Family     Breast cancer Family     Colon cancer Family     Hyperlipidemia Family     Emphysema Family         INTERSTITIAL    Osteoarthritis Family     Breast cancer Maternal Aunt 43    Colon cancer Maternal Grandfather 58    Breast cancer Cousin 40         Medications have been verified  Objective   /74   Pulse (!) 114   Temp 98 5 °F (36 9 °C)   Ht 5' 4" (1 626 m)   Wt 64 4 kg (142 lb)   SpO2 98%   BMI 24 37 kg/m²        Physical Exam     Physical Exam   Constitutional: She is oriented to person, place, and time  She appears well-developed and well-nourished  She appears distressed  HENT:   Head: Normocephalic and atraumatic  Nose: Nose normal    Mouth/Throat: No oropharyngeal exudate  Mild face drop in the left face   Eyes: Pupils are equal, round, and reactive to light  EOM are normal    Neck: Normal range of motion  Neck supple  Cardiovascular: Regular rhythm and normal heart sounds  Tachycardia   Pulmonary/Chest: Effort normal and breath sounds normal    Musculoskeletal: Normal range of motion  Neurological: She is alert and oriented to person, place, and time  A sensory deficit (Left hand numbness tingling) is present  Skin: Skin is warm and dry     Psychiatric:   She is distressed and anxious

## 2019-09-03 ENCOUNTER — TELEPHONE (OUTPATIENT)
Dept: NEUROLOGY | Facility: CLINIC | Age: 59
End: 2019-09-03

## 2019-09-03 ENCOUNTER — TELEPHONE (OUTPATIENT)
Dept: INTERNAL MEDICINE CLINIC | Facility: CLINIC | Age: 59
End: 2019-09-03

## 2019-09-03 ENCOUNTER — TRANSITIONAL CARE MANAGEMENT (OUTPATIENT)
Dept: INTERNAL MEDICINE CLINIC | Facility: CLINIC | Age: 59
End: 2019-09-03

## 2019-09-03 NOTE — TELEPHONE ENCOUNTER
Pt put on the schedule to be seen today by answering service, however Dr Villarreal is not avail after 8:30am on tuesdays   Per Dr Villarreal I called pt and scheduled for 9:30am tommorow

## 2019-09-03 NOTE — TELEPHONE ENCOUNTER
----- Message from Min Toure MD sent at 8/31/2019 12:45 PM EDT -----  Regarding: follow up  Please have patient follow up with vascular neurology team  Unusual presentation but no stroke  Still think follow up would be appropriate  Attending preferred  4-6 weeks if available

## 2019-09-04 ENCOUNTER — OFFICE VISIT (OUTPATIENT)
Dept: INTERNAL MEDICINE CLINIC | Facility: CLINIC | Age: 59
End: 2019-09-04
Payer: COMMERCIAL

## 2019-09-04 ENCOUNTER — HOSPITAL ENCOUNTER (OUTPATIENT)
Dept: NON INVASIVE DIAGNOSTICS | Facility: CLINIC | Age: 59
Discharge: HOME | End: 2019-09-04
Payer: COMMERCIAL

## 2019-09-04 ENCOUNTER — APPOINTMENT (OUTPATIENT)
Dept: LAB | Facility: MEDICAL CENTER | Age: 59
End: 2019-09-04
Payer: COMMERCIAL

## 2019-09-04 VITALS — HEIGHT: 64 IN | BODY MASS INDEX: 24.11 KG/M2

## 2019-09-04 DIAGNOSIS — E78.5 HYPERLIPIDEMIA, UNSPECIFIED HYPERLIPIDEMIA TYPE: Chronic | ICD-10-CM

## 2019-09-04 DIAGNOSIS — G45.9 TRANSIENT ISCHEMIC ATTACK: ICD-10-CM

## 2019-09-04 DIAGNOSIS — G45.9 TIA (TRANSIENT ISCHEMIC ATTACK): Primary | ICD-10-CM

## 2019-09-04 DIAGNOSIS — G45.9 TIA (TRANSIENT ISCHEMIC ATTACK): ICD-10-CM

## 2019-09-04 PROCEDURE — 99496 TRANSJ CARE MGMT HIGH F2F 7D: CPT | Performed by: INTERNAL MEDICINE

## 2019-09-04 PROCEDURE — 93226 XTRNL ECG REC<48 HR SCAN A/R: CPT

## 2019-09-04 PROCEDURE — 85613 RUSSELL VIPER VENOM DILUTED: CPT

## 2019-09-04 PROCEDURE — 85705 THROMBOPLASTIN INHIBITION: CPT

## 2019-09-04 PROCEDURE — 85732 THROMBOPLASTIN TIME PARTIAL: CPT

## 2019-09-04 PROCEDURE — 93225 XTRNL ECG REC<48 HRS REC: CPT

## 2019-09-04 PROCEDURE — 86147 CARDIOLIPIN ANTIBODY EA IG: CPT

## 2019-09-04 PROCEDURE — 81241 F5 GENE: CPT

## 2019-09-04 PROCEDURE — 85303 CLOT INHIBIT PROT C ACTIVITY: CPT

## 2019-09-04 PROCEDURE — 81240 F2 GENE: CPT

## 2019-09-04 PROCEDURE — 85306 CLOT INHIBIT PROT S FREE: CPT

## 2019-09-04 PROCEDURE — 85670 THROMBIN TIME PLASMA: CPT

## 2019-09-04 PROCEDURE — 36415 COLL VENOUS BLD VENIPUNCTURE: CPT

## 2019-09-04 RX ORDER — ATORVASTATIN CALCIUM 40 MG/1
40 TABLET, FILM COATED ORAL EVERY EVENING
Qty: 90 TABLET | Refills: 3 | Status: SHIPPED | OUTPATIENT
Start: 2019-09-04 | End: 2019-09-16 | Stop reason: SDUPTHER

## 2019-09-04 NOTE — PROGRESS NOTES
Assessment/Plan:    1  Recent episode of numbness of the left arm with difficulty with speech lasting several minutes - admitted to the hospital with all labs normal CT angiogram of the head shows only minimal atherosclerosis of the aorta but no significant vascular occlusive disease  MRI the brain normal   All labs normal   Chest x-ray benign  Clinically this is suspicious for TIA without an obvious etiology  Extensive discussion held  She needs additional investigation to rule out cardiac emboli source  She also needs workup to rule out hypercoagulability  She will have an echo with bubble study  She will have a 48 hour Holter  She will have labs to include Factor 5 Leiden, prothrombin gene mutation, protein C, protein S, anticardiolipin antibody, lupus anticoagulant  She will continue baby aspirin daily  She will continue atorvastatin 40 milligrams daily that was started in the hospital   She will be reassessed and call if she notes recurrent symptoms  She needs additional testing over the next couple days and I told her she could return to work on 09/09  At next visit we will try and set her up with Dr Lee  2  Atherosclerosis -noted of  The aortic arch  As noted now on aspirin daily and atorvastatin 40 milligrams daily  The  3  Left thyroid nodule measuring less than 1 5 centimeters -is felt because this nodules less than 1 5 centimeters in size without suspicious  No other evaluation is needed- CONSIDER ULTRASOUND OF THE THYROID IN THE FUTURE  4   ATYPICAL CHEST PAIN -BRINGING STRESS TEST ORDERED    ALL OTHER PROBLEMS AS PER NOTE OF MAY 2019     I WILL ALSO ADD THAT SHE WAS SCHEDULED FOR GYN SURGERY 9 TOLD HER TO POSTPONE THAT FOR AT LEAST 6 MONTHS BECAUSE OF HER NEUROLOGIC SYMPTOMS       MEDICAL REGIMEN:      Baby aspirin daily, atorvastatin 40 milligrams daily, spironolactone 50 milligrams -2 p o  B i d , multivitamin, vitamin D3/ 2000 units a day, vitamin-D - 93282 units monthly, doxycycline 50 milligrams daily which she uses intermittently       This patient will be seen at previously scheduled appointment with previously scheduled labs  Addendum -laboratory testing shows anticardiolipin antibodies normal lupus anticoagulant normal protein C normal protein S mildly elevated which is of no clinical significance  Echocardiogram shows EF of 84% grade 1 diastolic dysfunction she has a probable patent foramina ovale with a positive bubble study  I called and spoke with the patient and explained  The situation to her- her 48 hour Holter results are pending  She will have a venous Doppler of her leg to rule out DVT and potential paradoxical embolism  If Holter is benign and Doppler is negative she will then need evaluation by neurology-Dr Lee   To see if she is a candidate for percutaneous closure of her PFO    Addendum- 48 hour Holter benign  Factor 5 Leiden normal  Prothrombin gene mutation normal  Venous Doppler of legs  Normal without evidence of DVT-Will be referred to Neurology    Addendum -patient's daughter called on September 13 stating yesterday the patient had headache times 30 minutes-patient has frequent headaches she will call and will need additional valuation        No problem-specific Assessment & Plan notes found for this encounter  Diagnoses and all orders for this visit:    TIA (transient ischemic attack)  -     Cardiolipin antibody; Future  -     Protein C activity; Future  -     Protein S activity; Future  -     Prothrombin gene mutation; Future  -     Factor 5 leiden; Future  -     Lupus anticoagulant; Future  -     Holter monitor - 48 hour; Future  -     Echo complete with contrast if indicated; Future  -     Stress test only, exercise; Future    Transient ischemic attack    Hyperlipidemia, unspecified hyperlipidemia type         Subjective:     Patient ID: Alicia Smith is a 60 y o  female  She had a recent neurologic episode    She was at work and had abrupt onset of left hand tingling and left arm numbness and had difficulty with getting words out and felt as though she had weakness of the left side of her face  She felt as though the entire episode lasted several minutes  She did not have any associated headache  She did not have any associated right-sided symptoms  She did not have any blurred or double vision  She was not running a fever  She denies any recent head trauma  She works at [employer]  She was taken by ambulance to the hospital was admitted and underwent workup  Chest x-ray was benign  CTA head and neck showed mild atherosclerosis of the aortic arch but no significant intracranial branching or large vessel occlusion  CT scan angiogram of the neck was benign  MRI the brain a without contrast was normal   Laboratory testing was  Without significant abnormalities  Neurology wondered about a stress reaction  BUN and creatinine were normal   Chemistry profile was normal   Triglycerides were 45 cholesterol 143 HDL 68 LDL 66  White count was 7 56 with normal hemoglobin and platelet  She denies any family history of hypercoagulability  She was sent home on baby aspirin and atorvastatin 40 milligrams daily  She has been asymptomatic since leaving the hospital   She will be scheduling a follow-up appoint with with Neurology  We had a long discussion today regarding affected this clinically with suspicious for TIA  She needs additional valuation  I made a drawing of the anatomy  She will need echo with bubble study  She will need a 48 hour Holter  She also talked about atypical chest pain -tends to happen when upset -not necessary with activity but I also ordered a stress test   As noted she has some atherosclerosis of the aorta  she denies any family history of hypercoagulability   -She has not been using any estrogen      Review of Systems   Neurological: Positive for speech difficulty and numbness  Objective:     Physical Exam   Constitutional: She is oriented to person, place, and time  She appears well-developed and well-nourished  No distress  HENT:   Head: Normocephalic and atraumatic  Right Ear: External ear normal    Left Ear: External ear normal    Nose: Nose normal    Mouth/Throat: Oropharynx is clear and moist  No oropharyngeal exudate  Eyes: Pupils are equal, round, and reactive to light  Conjunctivae and EOM are normal  Right eye exhibits no discharge  Left eye exhibits no discharge  No scleral icterus  Neck: Normal range of motion  Neck supple  No JVD present  No tracheal deviation present  No thyromegaly present  Cardiovascular: Normal rate, regular rhythm and intact distal pulses  Exam reveals no gallop and no friction rub  No murmur heard  Pulmonary/Chest: Effort normal and breath sounds normal  No respiratory distress  She has no wheezes  She has no rales  She exhibits no tenderness  Abdominal: Soft  Bowel sounds are normal  She exhibits no distension and no mass  There is no tenderness  There is no rebound and no guarding  Musculoskeletal: Normal range of motion  She exhibits no edema or deformity  Lymphadenopathy:     She has no cervical adenopathy  Neurological: She is alert and oriented to person, place, and time  She has normal reflexes  She displays normal reflexes  No cranial nerve deficit  She exhibits normal muscle tone  Coordination normal    Skin: Skin is warm and dry  No rash noted  No erythema  Psychiatric: She has a normal mood and affect  Her behavior is normal  Judgment and thought content normal          Vitals:    09/04/19 0933   Height: 5' 4" (1 626 m)       Transitional Care Management Review:  Sarah Wetzel is a 60 y o  female here for TCM follow up       During the TCM phone call patient stated:    TCM Call (since 8/4/2019)     Date and time call was made  9/3/2019  7:49 AM    Hospital care reviewed  Records reviewed    Patient was hospitial[facility] at  [facility]    Date of Admission  08/30/19    Date of discharge  08/31/19    Diagnosis  TIA (transient ischemic attack    Disposition  Home    Current Symptoms  None      TCM Call (since 8/4/2019)     I have advised the patient to call PCP with any new or worsening symptoms  [facility]    Living Arrangements  Spouse or Significiant other          Charlsie Merlin, MD

## 2019-09-04 NOTE — TELEPHONE ENCOUNTER
Work note you wrote for patient is dated starting today but she was admitted to the hospital on Friday, August 30th and needs the note to reflect that because the hospital did not give her one  Please advise  Pt would like note emailed to her @   [email or web address]

## 2019-09-05 ENCOUNTER — TELEPHONE (OUTPATIENT)
Dept: INTERNAL MEDICINE CLINIC | Facility: CLINIC | Age: 59
End: 2019-09-05

## 2019-09-05 ENCOUNTER — TELEPHONE (OUTPATIENT)
Dept: NEUROLOGY | Facility: CLINIC | Age: 59
End: 2019-09-05

## 2019-09-05 LAB
ATRIAL RATE: 98 BPM
CARDIOLIPIN IGA SER IA-ACNC: <9 APL U/ML (ref 0–11)
CARDIOLIPIN IGG SER IA-ACNC: <9 GPL U/ML (ref 0–14)
CARDIOLIPIN IGM SER IA-ACNC: <9 MPL U/ML (ref 0–12)
P AXIS: 80 DEGREES
PR INTERVAL: 132 MS
PROT C AG ACT/NOR PPP IA: 123 % OF NORMAL (ref 60–150)
QRS AXIS: 17 DEGREES
QRSD INTERVAL: 70 MS
QT INTERVAL: 340 MS
QTC INTERVAL: 434 MS
T WAVE AXIS: 37 DEGREES
VENTRICULAR RATE: 98 BPM

## 2019-09-05 PROCEDURE — 93010 ELECTROCARDIOGRAM REPORT: CPT | Performed by: INTERNAL MEDICINE

## 2019-09-05 NOTE — TELEPHONE ENCOUNTER
Patient does not wish to schedule at this time  States she will call if she does choose to schedule  Full details in encounter 9/5  Thank you!

## 2019-09-05 NOTE — TELEPHONE ENCOUNTER
Work note that you wrote for the pt did not include the time she was in the hospital  So it needs to read 8/30-9/9  Pt asked that it be emailed to her @ [email or web address]

## 2019-09-05 NOTE — TELEPHONE ENCOUNTER
The purpose of this phone call is to assess patient's general wellbeing or for any assistance needed with follow-up care  Patient needs stroke hospital follow up appointment scheduled  Called patient since discharge, she has not experienced any new or worsening stroke symptoms  Denies any residual symptoms following hospitalization  States she has returned to baseline  She returns to work Monday  Patient ambulates independently, preforms she own ADLs, also manages their own medications, appointments, and affairs  Patient had follow up with her PCP yesterday  Offered to schedule her stroke/TIA hospital follow up appointment  Patient refused to schedule at this time  States she would like to speak with her PCP first regarding appointment and will call if she chooses to schedule  I reviewed medications with her  There have not been any changes since discharge  she had no difficulties obtaining medications  Reports taking all as prescribed with no missed doses or medication side effects  Patient denies signs of bleeding  I reviewed stroke symptoms and risk factor management with her  she verbalizes understanding of information  Patient does not monitor BP at home  she is a non smoker  Patient states she "watches what she eats", no specific diet  Patient does not have any questions or concerns at this time

## 2019-09-06 ENCOUNTER — HOSPITAL ENCOUNTER (OUTPATIENT)
Dept: NON INVASIVE DIAGNOSTICS | Facility: CLINIC | Age: 59
Discharge: HOME | End: 2019-09-06
Payer: COMMERCIAL

## 2019-09-06 ENCOUNTER — APPOINTMENT (OUTPATIENT)
Dept: NON INVASIVE DIAGNOSTICS | Facility: CLINIC | Age: 59
End: 2019-09-06
Payer: COMMERCIAL

## 2019-09-06 DIAGNOSIS — G45.9 TIA (TRANSIENT ISCHEMIC ATTACK): ICD-10-CM

## 2019-09-06 LAB
APTT SCREEN TO CONFIRM RATIO: 1.1 RATIO (ref 0–1.4)
CONFIRM APTT/NORMAL: 41.8 SEC (ref 0–55)
LA PPP-IMP: NORMAL
PROT S ACT/NOR PPP: 149 % (ref 63–140)
SCREEN APTT: 35.1 SEC (ref 0–51.9)
SCREEN DRVVT: 35 SEC (ref 0–47)
THROMBIN TIME: 17.2 SEC (ref 0–23)

## 2019-09-06 PROCEDURE — 93306 TTE W/DOPPLER COMPLETE: CPT | Performed by: INTERNAL MEDICINE

## 2019-09-06 PROCEDURE — 93017 CV STRESS TEST TRACING ONLY: CPT

## 2019-09-06 PROCEDURE — 93018 CV STRESS TEST I&R ONLY: CPT | Performed by: INTERNAL MEDICINE

## 2019-09-06 PROCEDURE — 93306 TTE W/DOPPLER COMPLETE: CPT

## 2019-09-06 PROCEDURE — 93016 CV STRESS TEST SUPVJ ONLY: CPT | Performed by: INTERNAL MEDICINE

## 2019-09-06 PROCEDURE — 93799 UNLISTED CV SVC/PROCEDURE: CPT

## 2019-09-09 ENCOUNTER — TELEPHONE (OUTPATIENT)
Dept: INTERNAL MEDICINE CLINIC | Facility: CLINIC | Age: 59
End: 2019-09-09

## 2019-09-09 DIAGNOSIS — M79.605 BILATERAL LEG PAIN: Primary | ICD-10-CM

## 2019-09-09 DIAGNOSIS — M79.604 BILATERAL LEG PAIN: Primary | ICD-10-CM

## 2019-09-09 LAB
ARRHY DURING EX: NORMAL
CHEST PAIN STATEMENT: NORMAL
F5 GENE MUT ANL BLD/T: NORMAL
MAX DIASTOLIC BP: 66 MMHG
MAX HEART RATE: 153 BPM
MAX PREDICTED HEART RATE: 160 BPM
MAX. SYSTOLIC BP: 150 MMHG
PROTOCOL NAME: NORMAL
REASON FOR TERMINATION: NORMAL
TARGET HR FORMULA: NORMAL
TEST INDICATION: NORMAL
TIME IN EXERCISE PHASE: NORMAL

## 2019-09-09 PROCEDURE — 93227 XTRNL ECG REC<48 HR R&I: CPT | Performed by: INTERNAL MEDICINE

## 2019-09-09 NOTE — TELEPHONE ENCOUNTER
Patient wants to know is there a specific cardiologist she needs to see, if so how soon  She is aware she also needs to see neuro

## 2019-09-12 ENCOUNTER — TELEPHONE (OUTPATIENT)
Dept: NEUROLOGY | Facility: CLINIC | Age: 59
End: 2019-09-12

## 2019-09-12 ENCOUNTER — HOSPITAL ENCOUNTER (OUTPATIENT)
Dept: ULTRASOUND IMAGING | Facility: HOSPITAL | Age: 59
Discharge: HOME | End: 2019-09-12
Payer: COMMERCIAL

## 2019-09-12 DIAGNOSIS — Q21.1 PFO (PATENT FORAMEN OVALE): ICD-10-CM

## 2019-09-12 DIAGNOSIS — M79.604 BILATERAL LEG PAIN: ICD-10-CM

## 2019-09-12 DIAGNOSIS — G45.9 TRANSIENT ISCHEMIC ATTACK: Primary | ICD-10-CM

## 2019-09-12 DIAGNOSIS — M79.605 BILATERAL LEG PAIN: ICD-10-CM

## 2019-09-12 LAB — F2 GENE MUT ANL BLD/T: NORMAL

## 2019-09-12 PROCEDURE — 93970 EXTREMITY STUDY: CPT | Performed by: SURGERY

## 2019-09-12 PROCEDURE — 93970 EXTREMITY STUDY: CPT

## 2019-09-13 ENCOUNTER — TELEPHONE (OUTPATIENT)
Dept: INTERNAL MEDICINE CLINIC | Facility: CLINIC | Age: 59
End: 2019-09-13

## 2019-09-13 NOTE — TELEPHONE ENCOUNTER
Pt had an echo, stress test, venous, doppler, and CT while she was in the hospital  She asked that you call her daughter Jeri Cancino @ [phone] and explain all the tests rather than explain it to Asmita  Please advise

## 2019-09-13 NOTE — TELEPHONE ENCOUNTER
----- Message from Maryan Ramon MD sent at 9/13/2019  5:46 AM EDT -----  Please call the patient regarding herVenous Doppler-no evidence of blood clot

## 2019-09-16 DIAGNOSIS — G45.9 TIA (TRANSIENT ISCHEMIC ATTACK): ICD-10-CM

## 2019-09-16 RX ORDER — ATORVASTATIN CALCIUM 40 MG/1
40 TABLET, FILM COATED ORAL EVERY EVENING
Qty: 90 TABLET | Refills: 3 | Status: SHIPPED | OUTPATIENT
Start: 2019-09-16 | End: 2019-11-19

## 2019-09-28 ENCOUNTER — APPOINTMENT (OUTPATIENT)
Dept: LAB | Facility: MEDICAL CENTER | Age: 59
End: 2019-09-28
Payer: COMMERCIAL

## 2019-09-28 DIAGNOSIS — E78.2 MIXED HYPERLIPIDEMIA: Chronic | ICD-10-CM

## 2019-09-28 LAB
ALBUMIN SERPL BCP-MCNC: 4 G/DL (ref 3.5–5)
ALP SERPL-CCNC: 83 U/L (ref 46–116)
ALT SERPL W P-5'-P-CCNC: 25 U/L (ref 12–78)
ANION GAP SERPL CALCULATED.3IONS-SCNC: 6 MMOL/L (ref 4–13)
AST SERPL W P-5'-P-CCNC: 18 U/L (ref 5–45)
BASOPHILS # BLD AUTO: 0.03 THOUSANDS/ΜL (ref 0–0.1)
BASOPHILS NFR BLD AUTO: 1 % (ref 0–1)
BILIRUB SERPL-MCNC: 0.42 MG/DL (ref 0.2–1)
BUN SERPL-MCNC: 13 MG/DL (ref 5–25)
CALCIUM SERPL-MCNC: 9.4 MG/DL (ref 8.3–10.1)
CHLORIDE SERPL-SCNC: 105 MMOL/L (ref 100–108)
CHOLEST SERPL-MCNC: 114 MG/DL (ref 50–200)
CO2 SERPL-SCNC: 26 MMOL/L (ref 21–32)
CREAT SERPL-MCNC: 0.79 MG/DL (ref 0.6–1.3)
EOSINOPHIL # BLD AUTO: 0.09 THOUSAND/ΜL (ref 0–0.61)
EOSINOPHIL NFR BLD AUTO: 2 % (ref 0–6)
ERYTHROCYTE [DISTWIDTH] IN BLOOD BY AUTOMATED COUNT: 12.8 % (ref 11.6–15.1)
GFR SERPL CREATININE-BSD FRML MDRD: 82 ML/MIN/1.73SQ M
GLUCOSE P FAST SERPL-MCNC: 80 MG/DL (ref 65–99)
HCT VFR BLD AUTO: 42.6 % (ref 34.8–46.1)
HDLC SERPL-MCNC: 65 MG/DL (ref 40–60)
HGB BLD-MCNC: 13.6 G/DL (ref 11.5–15.4)
IMM GRANULOCYTES # BLD AUTO: 0.01 THOUSAND/UL (ref 0–0.2)
IMM GRANULOCYTES NFR BLD AUTO: 0 % (ref 0–2)
LDLC SERPL DIRECT ASSAY-MCNC: 42 MG/DL (ref 0–100)
LYMPHOCYTES # BLD AUTO: 1.79 THOUSANDS/ΜL (ref 0.6–4.47)
LYMPHOCYTES NFR BLD AUTO: 40 % (ref 14–44)
MCH RBC QN AUTO: 30.1 PG (ref 26.8–34.3)
MCHC RBC AUTO-ENTMCNC: 31.9 G/DL (ref 31.4–37.4)
MCV RBC AUTO: 94 FL (ref 82–98)
MONOCYTES # BLD AUTO: 0.62 THOUSAND/ΜL (ref 0.17–1.22)
MONOCYTES NFR BLD AUTO: 14 % (ref 4–12)
NEUTROPHILS # BLD AUTO: 1.99 THOUSANDS/ΜL (ref 1.85–7.62)
NEUTS SEG NFR BLD AUTO: 43 % (ref 43–75)
NRBC BLD AUTO-RTO: 0 /100 WBCS
PLATELET # BLD AUTO: 215 THOUSANDS/UL (ref 149–390)
PMV BLD AUTO: 11.5 FL (ref 8.9–12.7)
POTASSIUM SERPL-SCNC: 4.4 MMOL/L (ref 3.5–5.3)
PROT SERPL-MCNC: 7.4 G/DL (ref 6.4–8.2)
RBC # BLD AUTO: 4.52 MILLION/UL (ref 3.81–5.12)
SODIUM SERPL-SCNC: 137 MMOL/L (ref 136–145)
TRIGL SERPL-MCNC: 50 MG/DL
WBC # BLD AUTO: 4.53 THOUSAND/UL (ref 4.31–10.16)

## 2019-09-28 PROCEDURE — 85025 COMPLETE CBC W/AUTO DIFF WBC: CPT

## 2019-09-28 PROCEDURE — 80053 COMPREHEN METABOLIC PANEL: CPT

## 2019-09-28 PROCEDURE — 36415 COLL VENOUS BLD VENIPUNCTURE: CPT

## 2019-09-28 PROCEDURE — 83721 ASSAY OF BLOOD LIPOPROTEIN: CPT

## 2019-09-28 PROCEDURE — 80061 LIPID PANEL: CPT

## 2019-10-02 ENCOUNTER — OFFICE VISIT (OUTPATIENT)
Dept: NEUROLOGY | Facility: CLINIC | Age: 59
End: 2019-10-02
Payer: COMMERCIAL

## 2019-10-02 ENCOUNTER — HOSPITAL ENCOUNTER (OUTPATIENT)
Dept: RADIOLOGY | Age: 59
Discharge: HOME | End: 2019-10-02
Payer: COMMERCIAL

## 2019-10-02 VITALS
HEIGHT: 64 IN | HEART RATE: 90 BPM | DIASTOLIC BLOOD PRESSURE: 78 MMHG | SYSTOLIC BLOOD PRESSURE: 108 MMHG | BODY MASS INDEX: 24.11 KG/M2

## 2019-10-02 DIAGNOSIS — R20.9 ABNORMAL ARM SENSATION: ICD-10-CM

## 2019-10-02 DIAGNOSIS — R29.2 HYPER REFLEXIA: ICD-10-CM

## 2019-10-02 DIAGNOSIS — Q21.1 PFO (PATENT FORAMEN OVALE): ICD-10-CM

## 2019-10-02 DIAGNOSIS — R29.810 FACIAL DROOP: ICD-10-CM

## 2019-10-02 DIAGNOSIS — G45.9 TRANSIENT ISCHEMIC ATTACK: ICD-10-CM

## 2019-10-02 DIAGNOSIS — E78.5 HYPERLIPIDEMIA, UNSPECIFIED HYPERLIPIDEMIA TYPE: Chronic | ICD-10-CM

## 2019-10-02 DIAGNOSIS — Z86.73 HISTORY OF TIA (TRANSIENT ISCHEMIC ATTACK): ICD-10-CM

## 2019-10-02 DIAGNOSIS — R73.9 HYPERGLYCEMIA: Primary | ICD-10-CM

## 2019-10-02 PROCEDURE — 99215 OFFICE O/P EST HI 40 MIN: CPT | Performed by: PSYCHIATRY & NEUROLOGY

## 2019-10-02 PROCEDURE — 72141 MRI NECK SPINE W/O DYE: CPT

## 2019-10-02 NOTE — PROGRESS NOTES
Patient ID: Harish Combs is a 60 y o  female  Assessment/Plan:    No problem-specific Assessment & Plan notes found for this encounter  Diagnoses and all orders for this visit:    Hyperglycemia    Transient ischemic attack  -     Ambulatory referral to Neurology    PFO (patent foramen ovale)  -     Ambulatory referral to Neurology    Hyperlipidemia, unspecified hyperlipidemia type    History of TIA (transient ischemic attack)    Hyper reflexia    Abnormal arm sensation  -     MRI cervical spine wo contrast; Future  -     EEG Awake and asleep; Future    Facial droop  -     MRI cervical spine wo contrast; Future          Patient Instructions   Transient left-sided sensory changes and facial droop with dysarthric speech and ongoing hyper reflexia:  Calderon Quintero presents for an initial follow-up evaluation with regard to a recent episode when she experienced a marching abnormal sensation in her left upper extremity accompanied by left facial asymmetry and a facial droop  The symptoms overall lasted for approximately 5 minutes  I agree that transient ischemic attack is the most likely diagnosis however considering the March of her symptoms a focal sensory seizure with preserved awareness remains a consideration  Furthermore, on exam in the office today she does have left upper and lower extremity jorge hyper reflexia  This may be indicative of cervical spine stenosis which could produce similar symptoms  She reports that she is taking her medication overall, and she did not endorse any bleeding or bruising issues but does have some abnormal sensations depending on the amount of sugar she eats  She is relating that to her atorvastatin   -for ongoing stroke prevention she should continue her current combination of aspirin and appropriate blood pressure and glycemic control  -we will defer to the good judgment of her primary care team for monitoring of her cholesterol panel and blood sugar numbers    We would suggest targeting a blood pressure of less than 130/80 on a regular basis  -we will do a 2 week statin holiday  If she notices any significant change in the abnormal sensations related to sugar intake she should let our office know and we will transition to a different formulation of statin medications  Otherwise if she notices no change in her symptoms then she should restart her atorvastatin and follow-up with her primary care team   -considering the possibility that this may have represented some type of a focal seizure I will request a 1 time EEG  In order to investigate any contribution of cervical spine stenosis I will also request an MRI of the cervical spine  -pending the testing above, I would anticipate continuing with stroke prophylaxis  At this point I do not have a strong enough indication that her PFO was the cause of her symptoms  I would not recommend a transesophageal echocardiogram a potential PFO closure at this point  I do think that ongoing medical therapy is quite indicated  I will plan to be in touch with her with regard to the results of her test as soon as they are available and her to return to the office to see us in 4 months time although we would be happy to see her sooner if the need should arise  If she has any symptoms concerning for TIA or stroke such as sudden painless loss of vision or double vision, difficulty speaking or swallowing, vertigo/room spinning that does not quickly resolve, or weakness/numbness affecting 1 side of the face or body she should proceed by ambulance to the nearest emergency room immediately  We did discuss some of her additional plans in the office today  From my standpoint she has no flying restrictions  With regard to an anticipated elective surgical procedure I would suggest that this should wait for 3-6 months from the time of her transient ischemic attack            Subjective:    BRENDA     Elinor Laura presents for a follow-up with regard to her recent event  She described the events in detail  She was at work and in her usual state of health with no recent illnesses of any kind, no rashes or fevers  The event began with an abnormal sensation in her left hand that then marched up her left upper extremity  At that point she also experienced dysarthric speech and was described as having a facial droop  The symptoms overall lasted for less than 5 minutes with the exception of tingling in the left hand which persisted into the next day and she still gets it intermittently  She notes remote history of migraines but nothing recent  She denies any personal or family history of epilepsy and no prior concussions, meningitis, or encephalitis  She denies any family history of stroke in the young  I reviewed her risk factor profile in the office today  Her hypercoagulable panel is negative  She was found to have a PFO on her echocardiogram but no clot in the bilateral lower extremities  Although her initial vascular risk factors are reasonably well controlled, at this point does remain some concern with regard to whether not the actual diagnosis is transient ischemic attack  Additional diagnoses that would present in this way could include a simple partial seizure (focal sensory seizure with preserved awareness) marching up the left upper extremity  Additionally, considering her left-sided jorge hyper reflexia consideration should be given to cervical stenosis which could produce transient sensory symptoms and affect the left face if it is in the upper cervical spine  Ultimately, even if this is a transient ischemic attack, it occurred in the setting of no anti-platelet therapy in no statin therapy  Even if this were confirmed to be a small acute ischemic stroke, we would likely start with a trial of medical therapy before going straight PFO closure unless the stroke was very suggestive of a central embolic source    We discussed the possibility of her flying today and I would suggest that she is cleared from my standpoint to fly at her discretion  We also discussed a pelvic procedure for incontinence  I would suggest waiting for 3-6 months from the time of her event at which point in time the procedure could be performed  Ideally she would remain on aspirin for the procedure but if that is not possible she would be cleared to hold that  Finally, she describes that she has a sense of lightheadedness and a strange sensation in her head when she eats too much sugar  She is concerned that this may be related to her statin medication      Past Medical History:   Diagnosis Date    Abnormal cervical Papanicolaou smear     Acne     LAST ASSESSED: 1/17/15    Asthma     Change in bowel habit     Depression     Family hx of colon cancer requiring screening colonoscopy     father colon cancer/ maternal grandfather colon cancer    Long term use of drug     LAST ASSESSED: 10/3/13    Migraine     PONV (postoperative nausea and vomiting)     Retinal tear     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     LAST ASSESSED: 3/9/13    Vascular headache        Social History     Socioeconomic History    Marital status: /Civil Union     Spouse name: None    Number of children: None    Years of education: None    Highest education level: None   Occupational History    Occupation: [employer] Occupation: MEDICAL PROFESSIONAL   Social Needs    Financial resource strain: None    Food insecurity:     Worry: None     Inability: None    Transportation needs:     Medical: None     Non-medical: None   Tobacco Use    Smoking status: Never Smoker    Smokeless tobacco: Never Used   Substance and Sexual Activity    Alcohol use: Yes     Comment: social    Drug use: No    Sexual activity: None   Lifestyle    Physical activity:     Days per week: None     Minutes per session: None    Stress: None   Relationships    Social connections:     Talks on phone: None     Gets together: None     Attends Restoration service: None     Active member of club or organization: None     Attends meetings of clubs or organizations: None     Relationship status: None    Intimate partner violence:     Fear of current or ex partner: None     Emotionally abused: None     Physically abused: None     Forced sexual activity: None   Other Topics Concern    None   Social History Narrative    FEELS SAFE AT HOME       Family History   Problem Relation Age of Onset    Breast cancer Mother 68    Colon cancer Father 61    Brain cancer Sister     Breast cancer Maternal Grandmother 52    Coronary artery disease Family     Breast cancer Family     Colon cancer Family     Hyperlipidemia Family     Emphysema Family         INTERSTITIAL    Osteoarthritis Family     Breast cancer Maternal Aunt 43    Colon cancer Maternal Grandfather 58    Breast cancer Cousin 40         Current Outpatient Medications:     aspirin 81 mg chewable tablet, Chew 1 tablet (81 mg total) daily, Disp: , Rfl: 0    atorvastatin (LIPITOR) 40 mg tablet, Take 1 tablet (40 mg total) by mouth every evening, Disp: 90 tablet, Rfl: 3    cholecalciferol (VITAMIN D3) 1,000 units tablet, Take 1,000 Units by mouth daily, Disp: , Rfl:     clindamycin (CLEOCIN T) 1 %, 1 pad 2 (two) times a day , Disp: , Rfl:     doxycycline hyclate (VIBRAMYCIN) 50 mg capsule, 50 mg as needed , Disp: , Rfl:     ergocalciferol (VITAMIN D2) 50,000 units, , Disp: , Rfl:     spironolactone (ALDACTONE) 50 mg tablet, Take 2 tabs bid daily, Disp: , Rfl:           The following portions of the patient's history were reviewed: allergies, current medications, past family history, past medical history, past social history and problem list            Objective:    Blood pressure 108/78, pulse 90, height 5' 4" (1 626 m), not currently breastfeeding      Physical Exam    Neurological Exam    At the time of my evaluation she was awake, alert, and in no distress  Cranial nerves 2-12 were symmetrically intact bilaterally although there were slightly broken saccades with lateral downward gaze in both directions  Motor testing reveals 5/5 strength in bilateral upper lower extremities with no drift  Finger-to-nose testing is intact with eyes open and closed  Sensation is intact to pinprick and vibration in the distal bilateral upper extremities and to vibration in the bilateral lower extremities  Her gait is stable  Deep tendon reflexes were 2+ at the level of the biceps, patella, quadriceps strike, and Achilles on the right  3+ throughout the left hand side  ROS:    Review of Systems   Constitutional: Negative  Negative for appetite change and fever  HENT: Negative  Negative for hearing loss, tinnitus, trouble swallowing and voice change  Eyes: Negative  Negative for photophobia and pain  Respiratory: Negative  Negative for shortness of breath  Cardiovascular: Negative  Negative for palpitations  Gastrointestinal: Negative  Negative for nausea and vomiting  Endocrine: Negative  Negative for cold intolerance and heat intolerance  Genitourinary: Negative  Negative for dysuria, frequency and urgency  Musculoskeletal: Positive for back pain  Negative for myalgias and neck pain  Skin: Negative  Negative for rash  Neurological: Positive for light-headedness and headaches  Negative for dizziness, tremors, seizures, syncope, facial asymmetry, speech difficulty, weakness and numbness  Stabbing pains in left side of forehead  Tingling   Hematological: Negative  Does not bruise/bleed easily  Psychiatric/Behavioral: Negative  Negative for confusion, hallucinations and sleep disturbance  Reviewed ROS as entered by medical assistant      I have spent 45 minutes with Patient and family today in which greater than 50% of this time was spent in counseling/coordination of care regarding Diagnostic results, Prognosis, Risks and benefits of tx options, Intructions for management and Impressions

## 2019-10-04 ENCOUNTER — OFFICE VISIT (OUTPATIENT)
Dept: INTERNAL MEDICINE CLINIC | Facility: CLINIC | Age: 59
End: 2019-10-04
Payer: COMMERCIAL

## 2019-10-04 DIAGNOSIS — Z86.73 HISTORY OF TIA (TRANSIENT ISCHEMIC ATTACK): Primary | ICD-10-CM

## 2019-10-04 DIAGNOSIS — E78.5 HYPERLIPIDEMIA, UNSPECIFIED HYPERLIPIDEMIA TYPE: Chronic | ICD-10-CM

## 2019-10-04 DIAGNOSIS — G44.89 OTHER HEADACHE SYNDROME: ICD-10-CM

## 2019-10-04 DIAGNOSIS — Q21.1 PATENT FORAMEN OVALE: ICD-10-CM

## 2019-10-04 PROCEDURE — 99215 OFFICE O/P EST HI 40 MIN: CPT | Performed by: INTERNAL MEDICINE

## 2019-10-04 PROCEDURE — 3008F BODY MASS INDEX DOCD: CPT | Performed by: INTERNAL MEDICINE

## 2019-10-05 VITALS
DIASTOLIC BLOOD PRESSURE: 70 MMHG | RESPIRATION RATE: 14 BRPM | HEIGHT: 64 IN | BODY MASS INDEX: 24.34 KG/M2 | SYSTOLIC BLOOD PRESSURE: 110 MMHG | HEART RATE: 72 BPM | WEIGHT: 142.6 LBS

## 2019-10-05 PROBLEM — G44.89 OTHER HEADACHE SYNDROME: Status: ACTIVE | Noted: 2019-10-05

## 2019-10-05 PROBLEM — Q21.1 PATENT FORAMEN OVALE: Chronic | Status: ACTIVE | Noted: 2019-10-05

## 2019-10-05 PROBLEM — Q21.12 PATENT FORAMEN OVALE: Chronic | Status: ACTIVE | Noted: 2019-10-05

## 2019-10-05 PROBLEM — Q21.1 PATENT FORAMEN OVALE: Status: ACTIVE | Noted: 2019-10-05

## 2019-10-05 PROBLEM — Q21.12 PATENT FORAMEN OVALE: Status: ACTIVE | Noted: 2019-10-05

## 2019-10-05 NOTE — PROGRESS NOTES
Assessment/Plan:   1  Recent episode of numbness of the left arm with difficulty with speech lasting several minutes  Was admitted with all labs normal, CT angiogram of the head shows only minimal atherosclerosis of the aorta but no significant occlusive disease  MRI the brain normal   All labs normal   Chest x-ray benign  Hypercoagulability workup including factor 5 Leiden, prothrombin gene mutation, protein C, protein S, anticardiolipin antibody, lupus anticoagulant,  All normal   48 hour Holter without arrhythmia  Echo shows EF is 13%, grade 1 diastolic dysfunction, probable patent foramen ovale with positive bubble study  Venous Doppler legs negative for DVT  Because of slight hyperreflexia in the left neurology ordered an MRI to rule out cervical stenosis which showed no significant pathology in only mild DJD  Neurology also wanted to consider potential focal sensory seizure  As there was slight tendency towards marching of symptoms- they recommended at this point we not close the PFO as she was not on any antiplatelet therapy or statin therapy at the time of the event  Also need to consider the possibility of complicated migraine but is noted patient had not had any vascular headaches in years  -at this point she will continue her aspirin therapy  We are switching statins as dictated below  She is seeing Neurology over the next several months and is scheduled for an EEG  2  Recent left-sided headache- times 30 minutes-also had a right-sided headache -did not have associated nausea  Or photophobia- watching for recurrence of vascular headaches but at this point monitoring  3  Hyperlipidemia -was placed on atorvastatin the hospital but patient feels this may be causing some new symptoms including feeling poorly when she eats sugar- she prefers to switch statins    She will stop atorvastatin which neurology had already told her to do for 2 weeks-she will now begin generic Crestor 5 milligrams every other day   4  PFO -questionable clinical significance -she is aware  This can be an incidental finding -at this point Neurology does not feel we should treat this   5  Atherosclerosis of the aortic arch -as noted now on aspirin daily and statin  6  Left thyroid nodule measuring less than 1 5 centimeters  -Radiology felt this did not need additional valuation although we will consider repeat ultrasound of the thyroid in the year which would be in the fall of 2020  7  Atypical chest pain -recent stress test negative -patient knows to call for recurrent symptoms    8  Intermittent fatigue - no obvious etiology  - workup negative including chemistry profile CBC thyroid function B12 level Lyme titer cortisol level  Patient was concerned was related to her large amount of stress  9 -possible sleep apnea - notes some snoring but she defers on this  We considered home sleep study-she wants to  Video herself sleeping and observe  10  General care -again encouraged patient to proceed with bone density study  11  Health maintenance -candidate for the new zoster vaccine -she wants to consider this in 1 year plus when she will be in 4 years after initial vaccine  12  Incontinence -2 and recommended she consider gyn urologist-she was scheduled to do this in July  As noted cystoscopy in the past showed inflammatory bladder neck polyps with squamous metaplasia -this was evaluated by Dr Saint Clair    In the remote past she had been on Macrodantin suppression postponing surgery temporarily because of her recent neurologic symptoms  13   Rosacea-treatment as per Dermatology-currently on suppressive doxycycline-50  Milligrams which she takes intermittently have ridging about 2 weeks per monthhistory of acne in the past   Remains on spironolactone   Prior androgen levels all normal  14  Family history cancer of the colon with personal history of polyps   Colonoscopy in July 2017 showed polyps   Recommended repeat in 5 years which is in the summer 2022  15   Retinal tear-continues to recover from this  12   Dense breast-family history of breast CA as well   As noted subsequent mammograms are being done with both 2D and 3D studies  17   Prior concerned about possible hypoglycemia   Simultaneous sugar 77 with fasting insulin normal   Previously she had identified sugar 51 without definite associated symptoms-no longer an issue  18  Vitamin-D deficiency-remains on over-the-counter as well as prescription vitamin-D  19   Epistaxis-previously attributed to Claritin-D   Not an issue at present uses saline nasal spray  20   Strong family history of breast cancer-mother premenopausal breast CA   Grandmother and had premenopausal breast CA   No family history of ovarian  Sofie Mayfield has family history cancer of the colon  Annamaria Krabbe been previously recommended the patient she had periodic MRI of the breast as well as genetic testing   She had genetic testing was told results were negative  21   Anxiety improved-monitor  22  Luis Price in the past   At 1 point was being considered for possible Meniere's disease           MEDICAL REGIMEN:      Baby aspirin daily, generic Crestor 5 milligrams every other day, spironolactone 50 milligrams -2 p o  B i d , multivitamin, vitamin D3/ 2000 units a day, vitamin-D - 27414 units monthly, doxycycline 50 milligrams used intermittently for her skin disease     Appointment over the next several months with prior chemistry profile cholesterol profile await results of EEG the     Addendum- patient seen by Cardiology on a November the 20- they commented she has a coincidental PFO the setting of a neurologic event which is presumed to be a TIA but not definitive  They felt performing a SUMMER with potential percutaneous closure is not necessarily appropriate at this point  They commented that PFO closure for secondary prevention is much more of a yield the procedure younger patients    They commented she does not with take her statin that she has a 10 year risk to 1 7% without definite vascular disease and they told her to stop her statin  No problem-specific Assessment & Plan notes found for this encounter  Diagnoses and all orders for this visit:    History of TIA (transient ischemic attack)  -     CBC and differential; Future  -     Comprehensive metabolic panel; Future  -     Cholesterol, total; Future  -     Triglycerides; Future  -     HDL cholesterol; Future  -     LDL cholesterol, direct; Future    Hyperlipidemia, unspecified hyperlipidemia type  -     CBC and differential; Future  -     Comprehensive metabolic panel; Future  -     Cholesterol, total; Future  -     Triglycerides; Future  -     HDL cholesterol; Future  -     LDL cholesterol, direct; Future    Other headache syndrome    Patent foramen ovale          Subjective:      Patient ID: Paul Wang is a 60 y o  female  She is seen today in reference to her recent neurologic event  She was accompanied to the visit by her   She saw Neurology who feels at this point we do not need to repair her patent foramen ovale  They felt transient ischemic attack was the most likely diagnosis but they wondered if she did have some March of her symptoms and wanted to consider focal sensory seizure with preserved awareness remaining a consideration  He felt on exam she had slight left upper and lower jorge hyper reflexia and wondered about the possibility of cervical spinal stenosis  They recommended ongoing therapy with aspirin appropriate blood sugar glycemic control  She talked about symptoms of a sensation of lightheadedness and a strain sensation in her head when she eats too much sugar  She was worried this may be related to her statin  Neurology recommended she stay off her statin for 2 weeks as a holiday  This patient remains on treatment with a statin   We reviewed various side effects of statins including statin associated myalgias, elevated liver enzymes, and occasional GI symptoms  We reviewed goal lipid parameters for this patient  We reviewed that myalgias are more likely to occur at higher doses of statins  We talked about the difference between hydrophilic and lipophilic statins  Periodic liver functions on this patient are being obtained to assess potential hepatotoxicity  This patient knows to call should there be any significant myalgias  We also reviewed that there can be drug-drug interactions that affect the levels of statins  we elected today to switch to generic Crestor  She did have an MRI of her cervical spine  This detail  This showed mild CIS spine spondylotic degenerative changes with mild central stenosis at C5-6 with mild right foraminal narrowing at C3-4 but no major issues  She had laboratory testing done prior this visit  LDL 42 HDL 65 triglycerides 50 cholesterol 114 chemistry profile normal with a creatinine of 0 79 under CBC she has slight increase in her monocytes not felt clinically significant  As noted factor 5 Leiden was negative  Lupus anticoagulant negative  Prothrombin gene mutation was negative  Protein S was actually minimally elevated  Protein C normal   Anticardiolipin antibody normal     She talked about other symptoms which occurred after she was here last   She had reviewed these with Neurology  On September 12th she had some left-sided headache which is very painful in caused her to cry   2 days later she had a right-sided headache  She had some tingling of the fingers in September 16th which she feels has been present intermittently since her event  She has had some low back pain and a couple of occasions and also describes a sensation of feeling poorly when she eats excess sugar  The headache occurred on September 12th was a stabbing headache lasting 30 minutes without nausea or photophobia    She has a history of migraine when younger but has not had migraine in years   She did not have migraine / headache at the time of her recent episode of left arm numbness and difficulty with speech      This patient denies any systemic symptoms  Specifically there has been no evidence of fever, night sweats, significant weight loss or significant decrease in appetite  Long discussion today regarding the above  This was a 40 minutes visit with more than 50% of the time spent counseling the patient formulating a treatment plan  Multiple questions were answered    She wanted to know if she could fly and neurology told her she had no flying restrictions  He told her to wait 3-6 months from the time of her TIA for any potential CIS pelvic surgery which was being planned in the future      The following portions of the patient's history were reviewed and updated as appropriate: allergies, current medications, past family history, past medical history, past social history, past surgical history and problem list     Review of Systems   Constitutional: Negative  Respiratory: Negative  Cardiovascular: Negative  Gastrointestinal: Negative  Endocrine: Negative  Genitourinary: Negative  Musculoskeletal: Negative  Neurological: Positive for numbness and headaches  Hematological: Negative  Psychiatric/Behavioral: Negative  Objective:      Ht 5' 4" (1 626 m)   Wt 64 7 kg (142 lb 9 6 oz)   BMI 24 48 kg/m²          Physical Exam   Constitutional: She is oriented to person, place, and time  She appears well-developed and well-nourished  No distress  HENT:   Head: Normocephalic and atraumatic  Right Ear: External ear normal    Left Ear: External ear normal    Nose: Nose normal    Mouth/Throat: Oropharynx is clear and moist  No oropharyngeal exudate  Eyes: Pupils are equal, round, and reactive to light  Conjunctivae and EOM are normal  Right eye exhibits no discharge  Left eye exhibits no discharge  No scleral icterus  Neck: Normal range of motion   Neck supple  No JVD present  No tracheal deviation present  No thyromegaly present  Cardiovascular: Normal rate, regular rhythm and intact distal pulses  Exam reveals no gallop and no friction rub  No murmur heard  Pulmonary/Chest: Effort normal and breath sounds normal  No respiratory distress  She has no wheezes  She has no rales  She exhibits no tenderness  Abdominal: Soft  Bowel sounds are normal  She exhibits no distension and no mass  There is no tenderness  There is no rebound and no guarding  Musculoskeletal: Normal range of motion  She exhibits no edema or deformity  Lymphadenopathy:     She has no cervical adenopathy  Neurological: She is alert and oriented to person, place, and time  She has normal reflexes  She displays normal reflexes  No cranial nerve deficit  She exhibits normal muscle tone  Coordination normal     Questionable very subtle hyper reflexia on the left   Skin: Skin is warm and dry  No rash noted  No erythema  Psychiatric: She has a normal mood and affect   Her behavior is normal  Judgment and thought content normal

## 2019-10-10 ENCOUNTER — HOSPITAL ENCOUNTER (OUTPATIENT)
Dept: NEUROLOGY | Facility: HOSPITAL | Age: 59
Discharge: HOME | End: 2019-10-10
Attending: PSYCHIATRY & NEUROLOGY
Payer: COMMERCIAL

## 2019-10-10 DIAGNOSIS — R20.9 ABNORMAL ARM SENSATION: ICD-10-CM

## 2019-10-10 PROCEDURE — 95816 EEG AWAKE AND DROWSY: CPT

## 2019-10-11 PROCEDURE — 95819 EEG AWAKE AND ASLEEP: CPT | Performed by: PSYCHIATRY & NEUROLOGY

## 2019-10-28 NOTE — RESULT ENCOUNTER NOTE
The EEG is quite normal, so we will continue to treat this even as a TIA with you current medications  Please call with any questions or concerns pending your follow up visit  Thanks!

## 2019-10-28 NOTE — RESULT ENCOUNTER NOTE
The MRI of the cervical spine looks good  I would say that, at this point, we will continue to treat this like a TIA with your current medications  Please call with any questions or concerns of any kind  Thanks!

## 2019-11-07 DIAGNOSIS — E55.9 VITAMIN D DEFICIENCY: ICD-10-CM

## 2019-11-07 RX ORDER — ERGOCALCIFEROL 1.25 MG/1
CAPSULE ORAL
Qty: 3 CAPSULE | Refills: 0 | Status: SHIPPED | OUTPATIENT
Start: 2019-11-07 | End: 2020-02-24

## 2019-11-19 RX ORDER — ROSUVASTATIN CALCIUM 5 MG/1
TABLET, COATED ORAL
Refills: 3 | COMMUNITY
Start: 2019-10-04 | End: 2020-03-03 | Stop reason: ALTCHOICE

## 2019-11-20 ENCOUNTER — OFFICE VISIT (OUTPATIENT)
Dept: CARDIOLOGY CLINIC | Facility: CLINIC | Age: 59
End: 2019-11-20
Payer: COMMERCIAL

## 2019-11-20 VITALS
WEIGHT: 144 LBS | DIASTOLIC BLOOD PRESSURE: 62 MMHG | BODY MASS INDEX: 24.59 KG/M2 | HEIGHT: 64 IN | HEART RATE: 74 BPM | SYSTOLIC BLOOD PRESSURE: 106 MMHG

## 2019-11-20 DIAGNOSIS — Q21.1 PATENT FORAMEN OVALE: Primary | Chronic | ICD-10-CM

## 2019-11-20 DIAGNOSIS — Z86.73 HISTORY OF TIA (TRANSIENT ISCHEMIC ATTACK): ICD-10-CM

## 2019-11-20 PROCEDURE — 99244 OFF/OP CNSLTJ NEW/EST MOD 40: CPT | Performed by: INTERNAL MEDICINE

## 2019-11-20 NOTE — PROGRESS NOTES
Cardiology Consultation      Aly Isaias  1960  [id number]  [address] [facility]    1  Patent foramen ovale     2  History of TIA (transient ischemic attack)            Discussion/Summary    1  Coincidental PFO in the setting of a neurological event which is presumed to be a TIA, not definitive but best working diagnosis presumptively so far  MRI negative  C-spine MRI reviewed and EEG also reviewed nonrevealing    Plan:  Patient has had a neurological event which at this point presumptively as a TIA  Although not for sure  This prompted cervical MRI as well as EEG to rule out other etiologies per  In this instance, particularly the age of 60 and no MRI finding of a definitive ischemic event agree with Dr Jae Schwartz that performing SUMMER with with an eye toward offering percutaneous PFO closure in the setting of not a definitive embolic neurological event is not necessarily appropriate at this point  We also discussed even if this was a definitive stroke or embolic event that PFO closure for secondary prevention is not as robust as it is in younger patients  Hypercoagulable panel negative  No evidence of atrial fibrillation  she does not want to take her statin, 10 year risk is 1 7% and no definitive vascular disease, so in my opinion does not definitively need  She is to continue aspirin  If anything changes from a neurological standpoint would be happy to revisit particularly if felt to be embolic presumptively from a PFO as only seen via transthoracic echocardiogram by agitated saline study  Spent 40 minutes with patient > 50% counseling regarding PFO, causality regarding stroke, PFO percutaneous closure  History:     Patient is a 24-year-old female who comes the office consultation today regarding a PFO in the setting of a neurological symptom  This was some via as transthoracic echocardiogram with bubble study with Valsalva and cough      It appears she had experienced left upper extremity abnormal sensation followed by a left facial droop  MRI normal     It was felt worse case scenario that this was a TIA  Was evaluated by Dr Colleen Carroll from stroke Neurology and felt continuing atorvastatin and aspirin was appropriate  She had imaging of her cervical spine as well as an EEG to exclude seizure and mechanical cervical spinal problems  She had a hypercoagulable panel which was negative    Venous duplex lower extremities also negative      Patient Active Problem List   Diagnosis    Acne    Anxiety    Asthma    Chest pain    Colon polyps    Dense breasts    Fatigue    Fibrocystic breast    Hyperglycemia    Hyperlipidemia    Osteopenia    Retinal detachment    Stress incontinence    Vitamin D deficiency    History of TIA (transient ischemic attack)    Hyper reflexia    Abnormal arm sensation    Facial droop    Other headache syndrome    Patent foramen ovale     Past Medical History:   Diagnosis Date    Abnormal cervical Papanicolaou smear     Acne     LAST ASSESSED: 1/17/15    Asthma     Change in bowel habit     Depression     Family hx of colon cancer requiring screening colonoscopy     father colon cancer/ maternal grandfather colon cancer    Long term use of drug     LAST ASSESSED: 10/3/13    Migraine     PONV (postoperative nausea and vomiting)     Retinal tear     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     LAST ASSESSED: 3/9/13    Vascular headache      Social History     Socioeconomic History    Marital status: /Civil Union     Spouse name: Not on file    Number of children: Not on file    Years of education: Not on file    Highest education level: Not on file   Occupational History    Occupation: CURRENTLY WORKING    Occupation: MEDICAL PROFESSIONAL   Social Needs    Financial resource strain: Not on file    Food insecurity:     Worry: Not on file     Inability: Not on file    Transportation needs: Medical: Not on file     Non-medical: Not on file   Tobacco Use    Smoking status: Never Smoker    Smokeless tobacco: Never Used   Substance and Sexual Activity    Alcohol use: Yes     Comment: social    Drug use: No    Sexual activity: Not on file   Lifestyle    Physical activity:     Days per week: Not on file     Minutes per session: Not on file    Stress: Not on file   Relationships    Social connections:     Talks on phone: Not on file     Gets together: Not on file     Attends Bahai service: Not on file     Active member of club or organization: Not on file     Attends meetings of clubs or organizations: Not on file     Relationship status: Not on file    Intimate partner violence:     Fear of current or ex partner: Not on file     Emotionally abused: Not on file     Physically abused: Not on file     Forced sexual activity: Not on file   Other Topics Concern    Not on file   Social History Narrative    FEELS SAFE AT HOME      Family History   Problem Relation Age of Onset    Breast cancer Mother 68    Colon cancer Father 61    Brain cancer Sister     Breast cancer Maternal Grandmother 52    Coronary artery disease Family     Breast cancer Family     Colon cancer Family     Hyperlipidemia Family     Emphysema Family         INTERSTITIAL    Osteoarthritis Family     Breast cancer Maternal Aunt 43    Colon cancer Maternal Grandfather 58    Breast cancer Cousin 40     Past Surgical History:   Procedure Laterality Date    APPENDECTOMY      BREAST EXCISIONAL BIOPSY Left     BREAST LUMPECTOMY      LOCATION    COLONOSCOPY      DILATION AND CURETTAGE OF UTERUS      INCISIONAL BREAST BIOPSY      [place] COLONOSCOPY FLX DX W/COLLJ SPEC WHEN PFRMD N/A 7/17/2017    Procedure: COLONOSCOPY;  Surgeon: Loco Smith MD;  Location: [facility];   Service: Colorectal    RETINOPATHY SURGERY      retinal tear repair x3       Current Outpatient Medications:     aspirin 81 mg chewable tablet, Chew 1 tablet (81 mg total) daily, Disp: , Rfl: 0    cholecalciferol (VITAMIN D3) 1,000 units tablet, Take 1,000 Units by mouth daily, Disp: , Rfl:     clindamycin (CLEOCIN T) 1 %, 1 pad 2 (two) times a day , Disp: , Rfl:     doxycycline hyclate (VIBRAMYCIN) 50 mg capsule, 50 mg as needed , Disp: , Rfl:     ergocalciferol (VITAMIN D2) 50,000 units, , Disp: , Rfl:     ergocalciferol (VITAMIN D2) 50,000 units, TAKE ONE CAPSULE BY MOUTH MONTHLY, Disp: 3 capsule, Rfl: 0    rosuvastatin (CRESTOR) 5 mg tablet, , Disp: , Rfl: 3    spironolactone (ALDACTONE) 50 mg tablet, Take 2 tabs bid daily, Disp: , Rfl:   Allergies   Allergen Reactions    Other Nausea Only and Vomiting     anesthesia    Azithromycin Other (See Comments)     Pt doesn't remember     Penicillins Rash       Social, Family and medication history as listed, reviewed and updated as necessary    Labs:   Lab Results   Component Value Date     08/22/2015    K 4 4 09/28/2019     09/28/2019    CO2 26 09/28/2019    BUN 13 09/28/2019    CREATININE 0 79 09/28/2019    CREATININE 0 74 08/31/2019    GLUCOSE 94 08/30/2019    CALCIUM 9 4 09/28/2019       Lab Results   Component Value Date    WBC 4 53 09/28/2019    HGB 13 6 09/28/2019    HGB 13 3 08/31/2019    HCT 42 6 09/28/2019    HCT 40 3 08/31/2019     09/28/2019     08/31/2019       Lab Results   Component Value Date    CHOL 150 08/22/2015    CHOL 151 06/28/2014     Lab Results   Component Value Date    HDL 65 (H) 09/28/2019    HDL 68 (H) 08/31/2019     Lab Results   Component Value Date    LDLCALC 66 08/31/2019    LDLCALC 66 06/27/2018     Lab Results   Component Value Date    TRIG 50 09/28/2019    TRIG 45 08/31/2019     Lab Results   Component Value Date    LDLDIRECT 42 09/28/2019    LDLDIRECT 78 04/27/2019       Lab Results   Component Value Date    ALT 25 09/28/2019    ALT 23 04/27/2019    AST 18 09/28/2019    AST 15 04/27/2019             No results found for: NTBNP    Lab Results Component Value Date    HGBA1C 5 3 08/31/2019    HGBA1C 5 5 04/27/2019    HGBA1C 5 6 06/27/2018       Imaging: Reviewed in epic      Review of Systems:  14 systems reviewed and negative with exception of the above       PHYSICAL EXAM:        Vitals:    11/20/19 1539   BP: 106/62   Pulse: 74     Body mass index is 24 72 kg/m²  Weight (last 2 days)     Date/Time   Weight    11/20/19 1539   65 3 (144)                 Gen: No acute distress  HEENT: anicteric, mucous membranes moist  Neck: supple, no jugular venous distention, or carotid bruit  Heart: regular, normal s1 and s2, no murmur/rub or gallop  Lungs :clear to auscultation bilaterally, no rales/rhonchi or wheeze  Abdomen: soft nontender, normoactive bowel sounds, no organomegaly  Ext: warm and perfused, normal femoral pulses, no edema, or clubbing  Skin: warm, no rashes  Neuro: AAO x 3, no focal findings  Psychiatric: normal affect  Musculoskeletal: no obvious joint deformities

## 2019-11-21 ENCOUNTER — TELEPHONE (OUTPATIENT)
Dept: INTERNAL MEDICINE CLINIC | Facility: CLINIC | Age: 59
End: 2019-11-21

## 2019-11-21 ENCOUNTER — ANNUAL EXAM (OUTPATIENT)
Dept: OBGYN CLINIC | Facility: CLINIC | Age: 59
End: 2019-11-21
Payer: COMMERCIAL

## 2019-11-21 ENCOUNTER — TELEPHONE (OUTPATIENT)
Dept: NEUROLOGY | Facility: CLINIC | Age: 59
End: 2019-11-21

## 2019-11-21 VITALS — SYSTOLIC BLOOD PRESSURE: 104 MMHG | BODY MASS INDEX: 24.48 KG/M2 | DIASTOLIC BLOOD PRESSURE: 72 MMHG | WEIGHT: 142.6 LBS

## 2019-11-21 DIAGNOSIS — Z01.419 PAP SMEAR, AS PART OF ROUTINE GYNECOLOGICAL EXAMINATION: ICD-10-CM

## 2019-11-21 DIAGNOSIS — N60.19 FIBROCYSTIC BREAST DISEASE (FCBD), UNSPECIFIED LATERALITY: ICD-10-CM

## 2019-11-21 DIAGNOSIS — Z01.419 ENCOUNTER FOR GYNECOLOGICAL EXAMINATION WITHOUT ABNORMAL FINDING: ICD-10-CM

## 2019-11-21 DIAGNOSIS — Z12.39 SCREENING FOR BREAST CANCER: Primary | ICD-10-CM

## 2019-11-21 PROCEDURE — 87624 HPV HI-RISK TYP POOLED RSLT: CPT | Performed by: OBSTETRICS & GYNECOLOGY

## 2019-11-21 PROCEDURE — 99396 PREV VISIT EST AGE 40-64: CPT | Performed by: OBSTETRICS & GYNECOLOGY

## 2019-11-21 PROCEDURE — G0145 SCR C/V CYTO,THINLAYER,RESCR: HCPCS | Performed by: OBSTETRICS & GYNECOLOGY

## 2019-11-21 NOTE — PROGRESS NOTES
Assessment/Plan:    No problem-specific Assessment & Plan notes found for this encounter  Normal gynecological physical examination  Self-breast examination stressed  Mammogram ordered  Discussed regular exercise, healthy diet, importance of vitamin D and calcium supplements  Discussed importance of sun block use during periods of prolonged sun exposure  Patient will be seen in 1 year for routine gynecologic and medical examination  Patient will call office for any problems, concerns, or issues which may arise during the interim  Diagnoses and all orders for this visit:    Screening for breast cancer  -     Mammo screening bilateral w 3d & cad; Future    Encounter for gynecological examination without abnormal finding  -     Liquid-based pap, screening    Other orders  -     Cancel: Mammo screening bilateral w cad; Future          Subjective:      Patient ID: Jade Irwin is a 60 y o  female  Patient is a 56y/o female who presents today for her annual gynecologic and medical examination  Pt reports that she performs self breast exams and denies any breast tenderness, masses, or skin changes  She states that she does not drink much caffeine  She reports cold intolerance but denies vasomotor symptoms  She also denies vaginal irritation or bleeding, abdominal or pelvic pain, or recent changes in bowel or bladder habits  She reports being sexually active and denies use of contraception or h/o STIs  Pt reports a TIA last August and is being followed by neurology and cardiology  Pt reports stress incontinence and was scheduled for urological surgery, but it was postponed due to her TIA    Pt states the she is up to date with colonoscopy screening      The following portions of the patient's history were reviewed and updated as appropriate: allergies, current medications, past family history, past medical history, past social history, past surgical history and problem list     Review of Systems Constitutional: Negative  Negative for appetite change, diaphoresis, fatigue, fever and unexpected weight change  HENT: Negative  Eyes: Negative  Respiratory: Negative  Negative for shortness of breath  Cardiovascular: Negative  Negative for chest pain  Gastrointestinal: Negative  Negative for abdominal pain, blood in stool, constipation, diarrhea, nausea and vomiting  Endocrine: Positive for cold intolerance  Negative for heat intolerance  Genitourinary: Negative  Negative for dysuria, frequency, hematuria, urgency, vaginal bleeding, vaginal discharge and vaginal pain  Musculoskeletal: Negative  Skin: Negative  Allergic/Immunologic: Negative  Neurological: Negative  Hematological: Negative  Negative for adenopathy  Psychiatric/Behavioral: Negative  Objective:      /72   Wt 64 7 kg (142 lb 9 6 oz)   BMI 24 48 kg/m²          Physical Exam   Constitutional: She appears well-developed  HENT:   Head: Normocephalic  Eyes: Pupils are equal, round, and reactive to light  Neck: Normal range of motion  Neck supple  Cardiovascular: Normal rate and regular rhythm  Pulmonary/Chest: Effort normal and breath sounds normal  Right breast exhibits no inverted nipple, no mass, no nipple discharge, no skin change and no tenderness  Left breast exhibits tenderness  Left breast exhibits no inverted nipple, no mass, no nipple discharge and no skin change  Breasts are symmetrical    Tenderness of the L breast at 6 o'clock due to fibrocystic changes   Abdominal: Soft  Normal appearance and bowel sounds are normal    Genitourinary: Rectum normal and vagina normal  Rectal exam shows guaiac negative stool  Pelvic exam was performed with patient supine  There is no rash or lesion on the right labia  There is no rash or lesion on the left labia  Uterus is not enlarged and not tender  Cervix exhibits no discharge and no friability   Right adnexum displays no mass, no tenderness and no fullness  Left adnexum displays no mass, no tenderness and no fullness  No erythema, tenderness or bleeding in the vagina  No vaginal discharge found  Lymphadenopathy:     She has no cervical adenopathy  She has no axillary adenopathy  Right: No inguinal and no supraclavicular adenopathy present  Left: No inguinal and no supraclavicular adenopathy present  Neurological: She is alert  Skin: Skin is intact  No rash noted  Psychiatric: She has a normal mood and affect   Her speech is normal and behavior is normal  Judgment and thought content normal  Cognition and memory are normal

## 2019-11-21 NOTE — TELEPHONE ENCOUNTER
Pt calling back in  I did relay message below  She states that she did not stop the medicaiton in October because she saw Dr Suman Freed who changed the dose to every other day and changed the medication  States that she did do that but still has the same problem  Her PCP, Dr Dominga Ag, states it is okay for her to come off the statin and Dr Suman Freed also said it was okay to come off the statin  He put a note in so you can see it in [facility]  Pt would like to know, if the other 2 doctors are saying she can come off of it and you are saying to come off of it for 3 weeks to see if it gets better, but seems that you would put pt back on a differnet medication? She is not sure of what to do, are you saying she cannot come off the medication in the future? She is not sure on which directions she should follow  Please advise

## 2019-11-21 NOTE — TELEPHONE ENCOUNTER
Patient came to window she wanted me to let you know that her Cardiologist had her d/c her statin, patient hasn't taken any statin today  She wanted you to be aware & make sure you agreed with the decision

## 2019-11-21 NOTE — TELEPHONE ENCOUNTER
Well when I saw her in October she mentioned that she felt strange when she ate sugar and was concerned it was the statin  We suggested at that time that she stop the statin for 2 weeks and if the sensation went away she was to call us and we would consider a different formulation, but if the sensation did not change she was to to back to it  Did she ever stop in October and is this sensation the same as what she described to me in the office? If it is the same, then she should stop the statin for now for 2 weeks and follow the instructions above    Even if this is something different I am ok with a TRIAL of stopping the statin to see if she clearly feels better after 2-3 weeks, and if so, then we can consider a different formulation  Just let me know

## 2019-11-21 NOTE — TELEPHONE ENCOUNTER
----- Message from José Luis Pulido sent at 11/21/2019 10:09 AM EST -----  Regarding: Prescription Question  Contact: [phone]  Hi it's Asmita Pulido I saw Dr Maria Luisa Galdamez yesterday and told him I feel weird on the statin  Can't explain the feeling  He recommended I stop taking it  I just wanted to check with you to make sure  I currently take rosuvastatin 5 mg every other day  I would prefer to stop it if you are in agreement   Thanks [place]

## 2019-11-22 NOTE — TELEPHONE ENCOUNTER
At this point she has been diagnosed with a transient ischemic attack  Taking a statin medication may reduce her risk of progressing to have an actual stroke, provided that she can take it with no side effects  At this point, considering that the dose was decreased already and there was no real difference, I am frankly not sure that the statin is doing this in the first place  My suggestion is that we find out by stopping it for 3 weeks  If the symptoms to not go away then I think that the statin was never causing her to feel different in the first place and thus not causing any side effects  In that situation, I think that the potential benefit of staying on it would outweigh the risk of a side effect  If she goes off of it and feels considerably better, then it might be the statin causing her to feel strange and in that case we can discuss if she should remain on a low dose of a different statin or if she can be off of one altogether  I did look at Dr Rita Cheek note  While I appreciate that her cholesterol panel itself looks good and the overall 10 year risk profile is quite low, I believe that the risk calculator presumes that someone has not already had a cardiovascular/cerebrovascular event and in the case that this really was a TIA  (and at this point we do not have an  alternative explanation for her symptoms), I am not sure that the risk is really as low as the calculator would suggest   I copied Dr Ailin Tillman on this note just to see what he thinks  I can appreciate that she is not in favor of remaining on a statin, and ultimately the choice is hers  Even if this is a TIA the risk reduction is not profound, and if she feels anxious about taking the medication it may not be unreasonable to discontinue it altogether  For the moment, at the very least, she should at least stop it for 3 weeks so that we can determine if she feels better or not     If she will do that and call us in 3 weeks then we can go from there in terms of whether or not she needs to be on a statin

## 2019-11-23 LAB
HPV HR 12 DNA CVX QL NAA+PROBE: NEGATIVE
HPV16 DNA CVX QL NAA+PROBE: NEGATIVE
HPV18 DNA CVX QL NAA+PROBE: NEGATIVE

## 2019-11-23 NOTE — TELEPHONE ENCOUNTER
Pepe Kaur your note  The following is a bit of a personal take and by no means meant to contradict anything you feel benefits your stroke patients  Statins in patients with no atherosclerotic plaque and with neurological events (tia/stroke) that are presumed embolic and are otherwise young and healthy we do not have any data on  Unfortunately, patients in [facility] trials  as well as [facility]PECT  did not receive statins a s part of the medical treatment arm  ESUS was a much older population  In my opinion, we cannot lump all comer stroke patients as benefit with statins  For example, a 79-year-old stroke patient with pristine vasculature  How do we know this benefits secondary stroke? We do not  She basically stated she really couldnt tolerate her statin and  with the absence of data in this patient population told her she can stop it  If you feel st rongly that she needs it certainly you  can continue it but without data I feel she doesnt  I am of the opinion that sub groups are very important  We mandate atrial fibrillation to be ruled out  in cryptogenic stroke patients but  the average age of the occult Afib  data in  this patient population is 76  Why would I rule out atrial fibrillation  in a 22year-old  as a cause of their stroke who has a pfo? if they showed up in the  ER with it we know that their stroke risk is extremely low with a structural normal heart and do not put them on A/c  So getting back to this patient, she is in her 46s with no known atherosclerotic disease and a neurological event that we have presumed is a TIA  In review of her imaging I did not find any atherosclerotic disease  To my knowledge I am not aware that we have data that lifelong statin benefits this subgroup  Of co urse, if they tolerate it I always continue it  She stated that she could not so I felt compelled to say that it was OK to stop  If you feel strongly that she should continue by all means go ahead  Best regards,    Emely Lyle

## 2019-11-25 NOTE — TELEPHONE ENCOUNTER
Patient made aware to stop statin for 3 weeks and call back to see if she feels better  Also made her aware Dr Nila Philip and Dr Danisha Ballesteros are discussing her concerns of staying on the statin  Please advise if there is anything else you would like to pass along to the patient at this time, thanks!

## 2019-11-26 LAB
LAB AP GYN PRIMARY INTERPRETATION: NORMAL
Lab: NORMAL

## 2019-11-26 NOTE — TELEPHONE ENCOUNTER
OSKAR Ling,    Thanks a lot for your detailed response  I absolutely agree    A real grey area  I also did not really see any atherosclerotic disease on her imaging, and no real microvascular disease on her MRI  One thing that makes this a little more challenging is that I would have a hard time characterizing her event as embolic in origin  Of course that is entirely possible, but with no damage on the MRI to clarify and her paucity of small vessel risk factors or any other thrombotic risk factors it is difficult to say  Her clinical description seems to localize reasonably well to one area but with a TIA that can be falsely localized  If it is small vessel in origin then we would typically expect the statin to [facility] lower the risk but the data does not address it really, it is just typical practice  Absolutely, if she feels much better off of the statin I have no problem with taking it off  I do recall her being pretty anxious about it in the first place, and that may also play a role but if so then she will certainly feel better off of it  Again, thanks a lot! 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  (and I mean that as a compliment    If I ever have a PFO and it needs to be closed, your the man!)      Thanks!!  Kathy Ontiveros

## 2019-11-27 NOTE — TELEPHONE ENCOUNTER
Aurora Romero,    Thanks very much for your reply  Glad we are on the same page  I always like to see MRI  evidence of embolic disease before offering a closure  Quita null  This is where I feel  you shine  You can take the imaging with the history and render an opinion  Its extremely helpful       Best regards,    [facility]

## 2020-01-22 ENCOUNTER — TELEPHONE (OUTPATIENT)
Dept: NEUROLOGY | Facility: CLINIC | Age: 60
End: 2020-01-22

## 2020-01-23 ENCOUNTER — TELEPHONE (OUTPATIENT)
Dept: NEUROLOGY | Facility: CLINIC | Age: 60
End: 2020-01-23

## 2020-01-23 NOTE — TELEPHONE ENCOUNTER
Rescheduled appt from 2/18/20 with Dr Roark Brunner to 9/17/20 in [place]  I offered pt earlier appt in [facility], pt refused [facility] office stated she can only be seen in [place] other the [facility] office  Pt wasn't happy with resched, I did apologize, and added pt to Wait List as High priority

## 2020-01-23 NOTE — TELEPHONE ENCOUNTER
See 11/21 encounter  Patient calling, states she is upset because no one ever called her back regarding discussion between Dr Russ Beck and Dr Jerri Peralta  Did explain to patient that she was advised to stop her statin for 3 weeks and call back with an update on how she was feeling  She states she was still expecting a call back regarding further discussion  Patient reports since stopping her stating in November (she has not restarted this) she feels fine  States she no longer feels the side effects she was experiencing (felt "weird")  Made her aware per Dr Russ Beck- "Absolutely, if she feels much better off of the statin I have no problem with taking it off "    Dr Russ Beck- please confirm if you are agreeable to patient staying off of her statin       [phone] (work phone number)  until 4:30pm

## 2020-01-31 NOTE — TELEPHONE ENCOUNTER
Yes, considering the nature of her symptoms at the time and her cholesterol level she can remain off of a statin for now, we do not have to try a different formulation at the moment

## 2020-02-01 ENCOUNTER — APPOINTMENT (OUTPATIENT)
Dept: LAB | Facility: MEDICAL CENTER | Age: 60
End: 2020-02-01
Payer: COMMERCIAL

## 2020-02-01 DIAGNOSIS — E78.5 HYPERLIPIDEMIA, UNSPECIFIED HYPERLIPIDEMIA TYPE: Chronic | ICD-10-CM

## 2020-02-01 DIAGNOSIS — Z86.73 HISTORY OF TIA (TRANSIENT ISCHEMIC ATTACK): ICD-10-CM

## 2020-02-01 LAB
ALBUMIN SERPL BCP-MCNC: 3.9 G/DL (ref 3.5–5)
ALP SERPL-CCNC: 73 U/L (ref 46–116)
ALT SERPL W P-5'-P-CCNC: 31 U/L (ref 12–78)
ANION GAP SERPL CALCULATED.3IONS-SCNC: 2 MMOL/L (ref 4–13)
AST SERPL W P-5'-P-CCNC: 16 U/L (ref 5–45)
BASOPHILS # BLD AUTO: 0.04 THOUSANDS/ΜL (ref 0–0.1)
BASOPHILS NFR BLD AUTO: 1 % (ref 0–1)
BILIRUB SERPL-MCNC: 0.4 MG/DL (ref 0.2–1)
BUN SERPL-MCNC: 16 MG/DL (ref 5–25)
CALCIUM SERPL-MCNC: 9.3 MG/DL (ref 8.3–10.1)
CHLORIDE SERPL-SCNC: 109 MMOL/L (ref 100–108)
CHOLEST SERPL-MCNC: 174 MG/DL (ref 50–200)
CO2 SERPL-SCNC: 29 MMOL/L (ref 21–32)
CREAT SERPL-MCNC: 0.71 MG/DL (ref 0.6–1.3)
EOSINOPHIL # BLD AUTO: 0.09 THOUSAND/ΜL (ref 0–0.61)
EOSINOPHIL NFR BLD AUTO: 2 % (ref 0–6)
ERYTHROCYTE [DISTWIDTH] IN BLOOD BY AUTOMATED COUNT: 12.4 % (ref 11.6–15.1)
GFR SERPL CREATININE-BSD FRML MDRD: 93 ML/MIN/1.73SQ M
GLUCOSE P FAST SERPL-MCNC: 80 MG/DL (ref 65–99)
HCT VFR BLD AUTO: 42.9 % (ref 34.8–46.1)
HDLC SERPL-MCNC: 68 MG/DL
HGB BLD-MCNC: 13.9 G/DL (ref 11.5–15.4)
IMM GRANULOCYTES # BLD AUTO: 0 THOUSAND/UL (ref 0–0.2)
IMM GRANULOCYTES NFR BLD AUTO: 0 % (ref 0–2)
LDLC SERPL DIRECT ASSAY-MCNC: 87 MG/DL (ref 0–100)
LYMPHOCYTES # BLD AUTO: 1.99 THOUSANDS/ΜL (ref 0.6–4.47)
LYMPHOCYTES NFR BLD AUTO: 42 % (ref 14–44)
MCH RBC QN AUTO: 30.6 PG (ref 26.8–34.3)
MCHC RBC AUTO-ENTMCNC: 32.4 G/DL (ref 31.4–37.4)
MCV RBC AUTO: 95 FL (ref 82–98)
MONOCYTES # BLD AUTO: 0.46 THOUSAND/ΜL (ref 0.17–1.22)
MONOCYTES NFR BLD AUTO: 10 % (ref 4–12)
NEUTROPHILS # BLD AUTO: 2.13 THOUSANDS/ΜL (ref 1.85–7.62)
NEUTS SEG NFR BLD AUTO: 45 % (ref 43–75)
NRBC BLD AUTO-RTO: 0 /100 WBCS
PLATELET # BLD AUTO: 251 THOUSANDS/UL (ref 149–390)
PMV BLD AUTO: 11.7 FL (ref 8.9–12.7)
POTASSIUM SERPL-SCNC: 4.2 MMOL/L (ref 3.5–5.3)
PROT SERPL-MCNC: 7.5 G/DL (ref 6.4–8.2)
RBC # BLD AUTO: 4.54 MILLION/UL (ref 3.81–5.12)
SODIUM SERPL-SCNC: 140 MMOL/L (ref 136–145)
TRIGL SERPL-MCNC: 61 MG/DL
WBC # BLD AUTO: 4.71 THOUSAND/UL (ref 4.31–10.16)

## 2020-02-01 PROCEDURE — 36415 COLL VENOUS BLD VENIPUNCTURE: CPT

## 2020-02-01 PROCEDURE — 85025 COMPLETE CBC W/AUTO DIFF WBC: CPT

## 2020-02-01 PROCEDURE — 80053 COMPREHEN METABOLIC PANEL: CPT

## 2020-02-01 PROCEDURE — 80061 LIPID PANEL: CPT

## 2020-02-01 PROCEDURE — 83721 ASSAY OF BLOOD LIPOPROTEIN: CPT

## 2020-02-10 ENCOUNTER — OFFICE VISIT (OUTPATIENT)
Dept: INTERNAL MEDICINE CLINIC | Facility: CLINIC | Age: 60
End: 2020-02-10
Payer: COMMERCIAL

## 2020-02-10 VITALS — HEART RATE: 88 BPM | DIASTOLIC BLOOD PRESSURE: 72 MMHG | RESPIRATION RATE: 14 BRPM | SYSTOLIC BLOOD PRESSURE: 112 MMHG

## 2020-02-10 DIAGNOSIS — E78.5 HYPERLIPIDEMIA, UNSPECIFIED HYPERLIPIDEMIA TYPE: Primary | Chronic | ICD-10-CM

## 2020-02-10 DIAGNOSIS — Z86.73 HISTORY OF TIA (TRANSIENT ISCHEMIC ATTACK): ICD-10-CM

## 2020-02-10 DIAGNOSIS — E55.9 VITAMIN D DEFICIENCY: Chronic | ICD-10-CM

## 2020-02-10 PROCEDURE — 99214 OFFICE O/P EST MOD 30 MIN: CPT | Performed by: INTERNAL MEDICINE

## 2020-02-10 PROCEDURE — 1036F TOBACCO NON-USER: CPT | Performed by: INTERNAL MEDICINE

## 2020-02-10 NOTE — PROGRESS NOTES
Assessment/Plan:  1  Intermittent fatigue -no obvious etiology  Workup negative including chemistry profile, CBC, thyroid function, B12 level, Lyme titer and cortisol level  This point monitoring  2  Mild atherosclerosis of the aortic arch - cardiology had told her she could stop her statin  She had issues with atorvastatin  A total we should consider low-dose of a hydrophilic statin a-I will contact cardiology to see if they are in agreement with this and then if they are patient can begin generic Crestor 5 milligrams weekly for months and increase to 5 milligrams twice weekly   3 episode of numbness of the left arm with difficulty with speech lasting several minutes  Was admitted with all labs normal, CT angiogram of the head shows only minimal atherosclerosis of the aorta but no significant occlusive disease  MRI the brain normal   All labs normal   Chest x-ray benign  Hypercoagulability workup including factor 5 Leiden, prothrombin gene mutation, protein C, protein S, anticardiolipin antibody, lupus anticoagulant,  All normal   48 hour Holter without arrhythmia  Echo shows EF is 74%, grade 1 diastolic dysfunction, probable patent foramen ovale with positive bubble study  Venous Doppler legs negative for DVT  Because of slight hyperreflexia in the left neurology ordered an MRI to rule out cervical stenosis which showed no significant pathology in only mild DJD  Neurology also wanted to consider potential focal sensory seizure  As there was slight tendency towards marching of symptoms- they recommended at this point we not close the PFO as she was not on any antiplatelet therapy or statin therapy at the time of the event  Also need to consider the possibility of complicated migraine but is noted patient had not had any vascular headaches in years  -at this point she will continue her aspirin therapy  We are switching statins as dictated below    She is seeing Neurology over the next several months and is scheduled for an EEG  4  Hyperlipidemia - felt poorly on atorvastatin -cardiology felt she could stop her statin -at this point considering low-dose Crestor -await opinion of Cardiology  5   Infrequent headache -did not have associated nausea  Or photophobia- watching for recurrence of vascular headaches but at this point monitoring  6  PFO -questionable clinical significance -she is aware  This can be an incidental finding -at this point Neurology does not feel we should treat this - Cardiology also feels we should not treat it  7  Left thyroid nodule measuring less than 1 5 centimeters  -Radiology felt this did not need additional valuation although we will consider repeat ultrasound of the thyroid in the year which would be in the fall of 2020 SCHEDULE NEXT VISIT  8 Atypical chest pain -recent stress test negative -patient knows to call for recurrent symptoms  9 -possible sleep apnea - notes some snoring but she defers on this   We considered home sleep study-she wants to [facility] herself sleeping and observe  10  General care -again encouraged patient to proceed with bone density study  11  Health maintenance -candidate for the new zoster vaccine -she wants to consider this in 1 year plus when she will be in 4 years after initial vaccine  12   Incontinence -2 and recommended she consider gyn urologist-she was scheduled to do this in July   As noted cystoscopy in the past showed inflammatory bladder neck polyps with squamous metaplasia -this was evaluated by Dr Ernie Martinez the remote past she had been on Macrodantin suppression postponing surgery temporarily because of her recent neurologic symptoms  13   Rosacea-treatment as per Dermatology-currently on suppressive doxycycline-50  Milligrams which she takes intermittently have ridging about 2 weeks per monthhistory of acne in the past   Remains on spironolactone   Prior androgen levels all normal  14  Family history cancer of the colon with personal history of polyps   Colonoscopy in July 2017 showed polyps   Recommended repeat in 5 years which is in the summer 2022  15   Retinal tear-continues to recover from this  12   Dense breast-family history of breast CA as well   As noted subsequent mammograms are being done with both 2D and 3D studies  17   Prior concerned about possible hypoglycemia   Simultaneous sugar 77 with fasting insulin normal   Previously she had identified sugar 51 without definite associated symptoms-no longer an issue  18  Vitamin-D deficiency-remains on over-the-counter as well as prescription vitamin-D  19   Epistaxis-previously attributed to Claritin-D   Not an issue at present uses saline nasal spray  20   Strong family history of breast cancer-mother premenopausal breast CA   Grandmother and had premenopausal breast CA   No family history of ovarian  Renato Most has family history cancer of the colon  Sheeba Gordon been previously recommended the patient she had periodic MRI of the breast as well as genetic testing   She had genetic testing was told results were negative  21   Anxiety improved-monitor  22  Alyssa Waller in the past   At 1 point was being considered for possible Meniere's disease                    MEDICAL REGIMEN:                                                  Baby aspirin daily, , spironolactone 50 milligrams -2 p o  B i d , multivitamin, vitamin D3/ 2000 units a day, vitamin-D - 95369 units monthly, doxycycline 50 milligrams used intermittently for her skin disease  The     Appointment several months with prior chemistry profile cholesterol profile    Await opinion Cardiology regarding reinstitution of statin    Addendum- communication with Cardiology shows air in agreement with starting a statin -she will begin generic Crestor -5 milligrams once per week for 1 month then increase to twice per week       Addendum- patient had emailed someone in the office they are having ongoing low back pain -question related to lifting -they were using Tylenol arthritis without relief -adding cyclobenzaprine 5 milligrams 1 p o  HS p r n  For spasm - applying heating pad -if unrelieved will need formal exam    Addendum- patient had a mammogram done in May of 2020 that was read as unchanged with repeat recommended year- they commented patient talked about left breast tenderness and that she could be considered for targeted ultrasound if clinically indicated OZZY CHART NEXT VISIT  No problem-specific Assessment & Plan notes found for this encounter  Diagnoses and all orders for this visit:    Hyperlipidemia, unspecified hyperlipidemia type  -     CBC and differential; Future  -     Comprehensive metabolic panel; Future  -     Cholesterol, total; Future  -     HDL cholesterol; Future  -     LDL cholesterol, direct; Future  -     Triglycerides; Future  -     Vitamin D 25 hydroxy; Future    Vitamin D deficiency  -     CBC and differential; Future  -     Comprehensive metabolic panel; Future  -     Cholesterol, total; Future  -     HDL cholesterol; Future  -     LDL cholesterol, direct; Future  -     Triglycerides; Future  -     Vitamin D 25 hydroxy; Future    History of TIA (transient ischemic attack)  -     CBC and differential; Future  -     Comprehensive metabolic panel; Future  -     Cholesterol, total; Future  -     HDL cholesterol; Future  -     LDL cholesterol, direct; Future  -     Triglycerides; Future  -     Vitamin D 25 hydroxy; Future          Subjective:      Patient ID: Amaya Alvarenga is a 60 y o  female  She has occasional headaches as before but has not had any recurrent neurologic symptoms  The she was seen by cardiology in November they commented that she had a coincidental PFO in the setting of a neurologic event which was presumed to be a TIA but not definitive  Did not feel she needed a SUMMER with potential  Percutaneous closure    The commented that PFO closure for secondary prevention as much for higher pre year old when done with younger patients  They felt that she had a 1 7 risk of vascular disease and she could hold off on her statin  We reviewed today that on prior CT head she had mild atherosclerosis of the aorta  We talked today about low-dose of a hydrophilic statin as we talked about previously  I told the patient I would be in touch with Cardiology regarding potentially proceeding with this  She has not had any further new neurologic symptoms  She has some intermittent fatigue as before  She thinks this is more prominent since her neurologic event  Should prior evaluation for that including thyroid function, B12, Lyme titer, cortisol etcetera  This patient denies any systemic symptoms  Specifically there has been no evidence of fever, night sweats, significant weight loss or significant decrease in appetite        Results for orders placed or performed in visit on 02/01/20  -CBC and differential       Result                      Value             Ref Range           WBC                         4 71              4 31 - 10 16*       RBC                         4 54              3 81 - 5 12 *       Hemoglobin                  13 9              11 5 - 15 4 *       Hematocrit                  42 9              34 8 - 46 1 %       MCV                         95                82 - 98 fL          MCH                         30 6              26 8 - 34 3 *       MCHC                        32 4              31 4 - 37 4 *       RDW                         12 4              11 6 - 15 1 %       MPV                         11 7              8 9 - 12 7 fL       Platelets                   251               149 - 390 Th*       nRBC                        0                 /100 WBCs           Neutrophils Relative        45                43 - 75 %           Immat GRANS %               0                 0 - 2 %             Lymphocytes Relative        42                14 - 44 %           Monocytes Relative          10                4 - 12 %            Eosinophils Relative        2                 0 - 6 %             Basophils Relative          1                 0 - 1 %             Neutrophils Absolute        2 13              1 85 - 7 62 *       Immature Grans Absolute     0 00              0 00 - 0 20 *       Lymphocytes Absolute        1 99              0 60 - 4 47 *       Monocytes Absolute          0 46              0 17 - 1 22 *       Eosinophils Absolute        0 09              0 00 - 0 61 *       Basophils Absolute          0 04              0 00 - 0 10 *  -Comprehensive metabolic panel       Result                      Value             Ref Range           Sodium                      140               136 - 145 mm*       Potassium                   4 2               3 5 - 5 3 mm*       Chloride                    109 (H)           100 - 108 mm*       CO2                         29                21 - 32 mmol*       ANION GAP                   2 (L)             4 - 13 mmol/L       BUN                         16                5 - 25 mg/dL        Creatinine                  0 71              0 60 - 1 30 *       Glucose, Fasting            80                65 - 99 mg/dL       Calcium                     9 3               8 3 - 10 1 m*       AST                         16                5 - 45 U/L          ALT                         31                12 - 78 U/L         Alkaline Phosphatase        73                46 - 116 U/L        Total Protein               7 5               6 4 - 8 2 g/*       Albumin                     3 9               3 5 - 5 0 g/*       Total Bilirubin             0 40              0 20 - 1 00 *       eGFR                        93                ml/min/1 73s*  -Cholesterol, total       Result                      Value             Ref Range           Cholesterol                 174               50 - 200 mg/*  -Triglycerides       Result                      Value Ref Range           Triglycerides               61                <=150 mg/dL    -HDL cholesterol       Result                      Value             Ref Range           HDL, Direct                 68                >=40 mg/dL     -LDL cholesterol, direct       Result                      Value             Ref Range           LDL Direct                  87                0 - 100 mg/dl     she her  of joint gym  Sign exercise on a more regular basis  She remains on spironolactone as before  Blood pressure is normal   She is not a significant salt user  The following portions of the patient's history were reviewed and updated as appropriate: allergies, current medications, past family history, past medical history, past social history, past surgical history and problem list     Review of Systems   Constitutional: Positive for fatigue  Respiratory: Negative  Cardiovascular: Negative  Gastrointestinal: Negative  Endocrine: Negative  Genitourinary: Negative  Musculoskeletal: Negative  Neurological: Negative  Hematological: Negative  Psychiatric/Behavioral: Negative  Objective: There were no vitals taken for this visit  Physical Exam   Constitutional: She is oriented to person, place, and time  She appears well-developed and well-nourished  No distress  HENT:   Head: Normocephalic and atraumatic  Right Ear: External ear normal    Left Ear: External ear normal    Nose: Nose normal    Mouth/Throat: Oropharynx is clear and moist  No oropharyngeal exudate  Eyes: Pupils are equal, round, and reactive to light  Conjunctivae and EOM are normal  Right eye exhibits no discharge  Left eye exhibits no discharge  No scleral icterus  Neck: Normal range of motion  Neck supple  No JVD present  No tracheal deviation present  No thyromegaly present  Cardiovascular: Normal rate, regular rhythm and intact distal pulses   Exam reveals no gallop and no friction rub    No murmur heard  Pulmonary/Chest: Effort normal and breath sounds normal  No respiratory distress  She has no wheezes  She has no rales  She exhibits no tenderness  Abdominal: Soft  Bowel sounds are normal  She exhibits no distension and no mass  There is no tenderness  There is no rebound and no guarding  Musculoskeletal: Normal range of motion  She exhibits no edema or deformity  Lymphadenopathy:     She has no cervical adenopathy  Neurological: She is alert and oriented to person, place, and time  She has normal reflexes  She displays normal reflexes  No cranial nerve deficit  She exhibits normal muscle tone  Coordination normal    Skin: Skin is warm and dry  No rash noted  No erythema  Psychiatric: She has a normal mood and affect  Her behavior is normal  Judgment and thought content normal    Vitals reviewed

## 2020-02-12 NOTE — TELEPHONE ENCOUNTER
1st attempt to reschedule patient as 40 minute new appointment slot at 9:20 on 3/3/2020 with Dr Trav Chaney in [facility]  No answer  LMOM

## 2020-02-13 ENCOUNTER — TELEPHONE (OUTPATIENT)
Dept: INTERNAL MEDICINE CLINIC | Facility: CLINIC | Age: 60
End: 2020-02-13

## 2020-02-13 DIAGNOSIS — E78.2 MIXED HYPERLIPIDEMIA: Primary | ICD-10-CM

## 2020-02-13 RX ORDER — ROSUVASTATIN CALCIUM 5 MG/1
TABLET, COATED ORAL
Qty: 24 TABLET | Refills: 3 | Status: SHIPPED | OUTPATIENT
Start: 2020-02-13 | End: 2021-02-05 | Stop reason: ALTCHOICE

## 2020-02-13 NOTE — TELEPHONE ENCOUNTER
Call in rosuvastatin 5 mg- one po weekly for one month then increase to one twice weekly disp 26 r 3

## 2020-02-13 NOTE — TELEPHONE ENCOUNTER
----- Message from Dwane Krabbe, MD sent at 2/13/2020  5:19 AM EST -----   Please call patient -let her know I was in touch with her cardiologist who is in agreement with starting low-dose generic Crestor -begin rosuvastatin -5 milligrams-1 p o  Weekly for 1 month then increase to 1 p o   Twice weekly dispense 24 with 3 refills

## 2020-02-24 DIAGNOSIS — E55.9 VITAMIN D DEFICIENCY: ICD-10-CM

## 2020-02-24 RX ORDER — ERGOCALCIFEROL 1.25 MG/1
CAPSULE ORAL
Qty: 3 CAPSULE | Refills: 0 | Status: SHIPPED | OUTPATIENT
Start: 2020-02-24 | End: 2021-02-05 | Stop reason: ALTCHOICE

## 2020-03-03 ENCOUNTER — OFFICE VISIT (OUTPATIENT)
Dept: NEUROLOGY | Facility: CLINIC | Age: 60
End: 2020-03-03
Payer: COMMERCIAL

## 2020-03-03 VITALS
HEART RATE: 89 BPM | SYSTOLIC BLOOD PRESSURE: 100 MMHG | DIASTOLIC BLOOD PRESSURE: 60 MMHG | WEIGHT: 148 LBS | BODY MASS INDEX: 25.27 KG/M2 | HEIGHT: 64 IN

## 2020-03-03 DIAGNOSIS — E78.5 HYPERLIPIDEMIA, UNSPECIFIED HYPERLIPIDEMIA TYPE: Chronic | ICD-10-CM

## 2020-03-03 DIAGNOSIS — Z86.73 HISTORY OF TIA (TRANSIENT ISCHEMIC ATTACK): Primary | ICD-10-CM

## 2020-03-03 DIAGNOSIS — Q21.1 PATENT FORAMEN OVALE: Chronic | ICD-10-CM

## 2020-03-03 DIAGNOSIS — R73.9 HYPERGLYCEMIA: ICD-10-CM

## 2020-03-03 PROCEDURE — 99214 OFFICE O/P EST MOD 30 MIN: CPT | Performed by: PSYCHIATRY & NEUROLOGY

## 2020-03-03 PROCEDURE — 1036F TOBACCO NON-USER: CPT | Performed by: PSYCHIATRY & NEUROLOGY

## 2020-03-03 PROCEDURE — 3008F BODY MASS INDEX DOCD: CPT | Performed by: PSYCHIATRY & NEUROLOGY

## 2020-03-03 NOTE — PROGRESS NOTES
Patient ID: Dara Valadez is a 63 y o  female  Assessment/Plan: This is a 62 y/o  Female who is here as a follow up for TIA which is likely embolic source of undetermined etiology  Patient was found to have PFO and she saw Dr Trav Shepherd about PFO closure, and she was deemed not to be a candidate for PFO closure  No new TIA/CVA like symptoms  I reviewed MRI C-spine images as well which did not show any evidence of any cord changes  PLAN:      Diagnoses and all orders for this visit:    History of TIA (transient ischemic attack)  -c/w with combination of aspirin 81mg PO qdaily, and no need to do crestor since she does have any vascular RF at this time and is not tolerating it, for secondary stroke prevention  -BP goal < 130/80, BP is at goal    -Defer to PCP regarding DM and BP management  -does not smoke at this time   -denies any history of snoring    -I advised patient to avoid using NSAIDs for headaches or other pain  -Recommend mediterranean diet & regular exercise regimen atleast 4-5 times a week for 20-30 minutes  -I educated patient/family regarding medication compliance    Patent foramen ovale    Hyperglycemia    Hyperlipidemia, unspecified hyperlipidemia type  -ok to stop crestor at this time especially since patient is not tolerating it well  I would like to follow up in 1 year    I would be happy to see the patient sooner if any new questions/concerns arise  Patient/Guardian was advised to the call the office if they have any questions and concerns in the meantime  Patient/Guardian does understand that if they have any new stroke like symptoms such as facial droop on one side, weakness/paralysis on either side, speech trouble, numbness on one side, balance issues, any vision changes, extreme dizziness or any new headache, to call 9-1-1 immediately or to proceed to the nearest ER immediately         Subjective:    HPI    This is a 62 y/o  Female who is here is follow up for history of TIA which is likely embolic source of undetermined etiology  Patient is found to have a PFO but was seen by Dr Ryann Gonzalez which was deemed to not be a candidate for PFO closure device  SHe had routine EEG which was normal and she also had MRI C-spine which was negative for any cord changes which I reviewed the images of  She does not have any vascular risk factors either  She is compliant with her medications with aspirin  She could not tolerate atorvastatin or Crestor  She is currently on Crestor 5mg once a week for stroke prevention  She does not want to take statins if can be avoided she states  She denies any new TIA/CVA like symptoms  The following portions of the patient's history were reviewed and updated as appropriate:   She  has a past medical history of Abnormal cervical Papanicolaou smear, Acne, Asthma, Change in bowel habit, Depression, Family hx of colon cancer requiring screening colonoscopy, Long term use of drug, Migraine, PONV (postoperative nausea and vomiting), Retinal tear, TIA (transient ischemic attack), 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, and Vascular headache  She   Patient Active Problem List    Diagnosis Date Noted    Other headache syndrome 10/05/2019    Patent foramen ovale 10/05/2019    Hyper reflexia 10/02/2019    Abnormal arm sensation 10/02/2019    Facial droop 10/02/2019    History of TIA (transient ischemic attack) 09/04/2019    Colon polyps 10/23/2017    Osteopenia 10/23/2017    Retinal detachment 10/23/2017    Hyperglycemia 03/07/2017    Fatigue 08/31/2016    Fibrocystic breast 04/11/2016    Stress incontinence 04/11/2016    Dense breasts 03/02/2016    Acne 01/17/2015    Asthma 07/02/2014    Hyperlipidemia 10/03/2013    Anxiety 03/09/2013    Chest pain 03/09/2013    Vitamin D deficiency 03/09/2013     She  has a past surgical history that includes Retinopathy surgery; Colonoscopy;  Appendectomy; pr colonoscopy flx dx w/collj spec when pfrmd (N/A, 7/17/2017); Breast lumpectomy; Dilation and curettage of uterus; Incisional breast biopsy; and Breast excisional biopsy (Left)  Her family history includes Brain cancer in her sister; Breast cancer in her family; Breast cancer (age of onset: 36) in her cousin; Breast cancer (age of onset: 43) in her maternal aunt; Breast cancer (age of onset: 52) in her maternal grandmother; Breast cancer (age of onset: 68) in her mother; Colon cancer in her family; Colon cancer (age of onset: 61) in her father; Colon cancer (age of onset: 58) in her maternal grandfather; Coronary artery disease in her family; Emphysema in her family; Hyperlipidemia in her family; Osteoarthritis in her family  She  reports that she has never smoked  She has never used smokeless tobacco  She reports that she drinks alcohol  She reports that she does not use drugs  Current Outpatient Medications   Medication Sig Dispense Refill    aspirin 81 mg chewable tablet Chew 1 tablet (81 mg total) daily  0    cholecalciferol (VITAMIN D3) 1,000 units tablet Take 1,000 Units by mouth daily      clindamycin (CLEOCIN T) 1 % 1 pad 2 (two) times a day       doxycycline hyclate (VIBRAMYCIN) 50 mg capsule 50 mg as needed       ergocalciferol (VITAMIN D2) 50,000 units       ergocalciferol (VITAMIN D2) 50,000 units TAKE ONE CAPSULE BY MOUTH MONTHLY 3 capsule 0    rosuvastatin (CRESTOR) 5 mg tablet 1 PO WEEKLY FOR 1 MONTH AND THEN 2 PO WEEKLY 24 tablet 3    spironolactone (ALDACTONE) 50 mg tablet Take 2 tabs bid daily       No current facility-administered medications for this visit        Current Outpatient Medications on File Prior to Visit   Medication Sig    aspirin 81 mg chewable tablet Chew 1 tablet (81 mg total) daily    cholecalciferol (VITAMIN D3) 1,000 units tablet Take 1,000 Units by mouth daily    clindamycin (CLEOCIN T) 1 % 1 pad 2 (two) times a day     doxycycline hyclate (VIBRAMYCIN) 50 mg capsule 50 mg as needed     ergocalciferol (VITAMIN D2) 50,000 units     ergocalciferol (VITAMIN D2) 50,000 units TAKE ONE CAPSULE BY MOUTH MONTHLY    rosuvastatin (CRESTOR) 5 mg tablet 1 PO WEEKLY FOR 1 MONTH AND THEN 2 PO WEEKLY    spironolactone (ALDACTONE) 50 mg tablet Take 2 tabs bid daily    [DISCONTINUED] rosuvastatin (CRESTOR) 5 mg tablet      No current facility-administered medications on file prior to visit  She is allergic to other; azithromycin; and penicillins            Objective:    Blood pressure 100/60, pulse 89, height 5' 4" (1 626 m), weight 67 1 kg (148 lb), not currently breastfeeding  Physical Exam  General - Patient is alert, awake, follows commands  Speech - no dysarthria noted, no aphasia noted  Neck - supple, no carotid bruits heard  Skin - no rashes or lesions  Chest - clear to ausculation bilaterally  Heart - normal S1, S2, no murmurs, rubs or gallops     Neuro:   Cranial nerves: PERRL, EOMI, no facial droop noted, facial sensation intact, tongue midline  Motor: 5/5 throughout, normal tone  Sensory - intact to soft touch throughout  Reflexes - 2+ throughout  Coordination - no ataxia/dysmetria noted  Gait - normal     ROS:  I personally reviewed ROS   Review of Systems   Constitutional: Negative  Negative for appetite change and fever  HENT: Negative  Negative for hearing loss, tinnitus, trouble swallowing and voice change  Eyes: Negative  Negative for photophobia and pain  Respiratory: Negative  Negative for shortness of breath  Cardiovascular: Negative  Negative for palpitations  Gastrointestinal: Negative  Negative for nausea and vomiting  Endocrine: Negative  Negative for cold intolerance and heat intolerance  Genitourinary: Negative  Negative for dysuria, frequency and urgency  Musculoskeletal: Negative  Negative for myalgias and neck pain  Skin: Negative  Negative for rash  Neurological: Negative    Negative for dizziness, tremors, seizures, syncope, facial asymmetry, speech difficulty, weakness, light-headedness, numbness and headaches  Hematological: Negative  Does not bruise/bleed easily  Psychiatric/Behavioral: Negative  Negative for confusion, hallucinations and sleep disturbance

## 2020-03-06 ENCOUNTER — APPOINTMENT (OUTPATIENT)
Dept: LAB | Age: 60
End: 2020-03-06

## 2020-03-06 ENCOUNTER — TRANSCRIBE ORDERS (OUTPATIENT)
Dept: ADMINISTRATIVE | Age: 60
End: 2020-03-06

## 2020-03-06 DIAGNOSIS — Z00.8 HEALTH EXAMINATION IN POPULATION SURVEY: ICD-10-CM

## 2020-03-06 DIAGNOSIS — Z00.8 HEALTH EXAMINATION IN POPULATION SURVEY: Primary | ICD-10-CM

## 2020-03-06 LAB
CHOLEST SERPL-MCNC: 153 MG/DL (ref 50–200)
EST. AVERAGE GLUCOSE BLD GHB EST-MCNC: 103 MG/DL
HBA1C MFR BLD: 5.2 %
HDLC SERPL-MCNC: 70 MG/DL
LDLC SERPL CALC-MCNC: 72 MG/DL (ref 0–100)
NONHDLC SERPL-MCNC: 83 MG/DL
TRIGL SERPL-MCNC: 55 MG/DL

## 2020-03-06 PROCEDURE — 80061 LIPID PANEL: CPT

## 2020-03-06 PROCEDURE — 83036 HEMOGLOBIN GLYCOSYLATED A1C: CPT

## 2020-03-06 PROCEDURE — 36415 COLL VENOUS BLD VENIPUNCTURE: CPT

## 2020-04-02 ENCOUNTER — TELEPHONE (OUTPATIENT)
Dept: INTERNAL MEDICINE CLINIC | Facility: CLINIC | Age: 60
End: 2020-04-02

## 2020-05-08 DIAGNOSIS — E55.9 VITAMIN D DEFICIENCY: Primary | ICD-10-CM

## 2020-05-08 RX ORDER — ERGOCALCIFEROL 1.25 MG/1
CAPSULE ORAL
Qty: 3 CAPSULE | Refills: 0 | Status: SHIPPED | OUTPATIENT
Start: 2020-05-08 | End: 2021-02-05 | Stop reason: ALTCHOICE

## 2020-05-12 ENCOUNTER — HOSPITAL ENCOUNTER (OUTPATIENT)
Dept: RADIOLOGY | Age: 60
Discharge: HOME | End: 2020-05-12
Payer: COMMERCIAL

## 2020-05-12 VITALS — WEIGHT: 146 LBS | BODY MASS INDEX: 24.92 KG/M2 | HEIGHT: 64 IN

## 2020-05-12 DIAGNOSIS — Z12.39 SCREENING FOR BREAST CANCER: ICD-10-CM

## 2020-05-12 PROCEDURE — 77067 SCR MAMMO BI INCL CAD: CPT

## 2020-05-12 PROCEDURE — 77063 BREAST TOMOSYNTHESIS BI: CPT

## 2020-07-11 ENCOUNTER — APPOINTMENT (OUTPATIENT)
Dept: LAB | Facility: MEDICAL CENTER | Age: 60
End: 2020-07-11
Payer: COMMERCIAL

## 2020-07-11 DIAGNOSIS — E55.9 VITAMIN D DEFICIENCY: Chronic | ICD-10-CM

## 2020-07-11 DIAGNOSIS — E78.5 HYPERLIPIDEMIA, UNSPECIFIED HYPERLIPIDEMIA TYPE: Chronic | ICD-10-CM

## 2020-07-11 DIAGNOSIS — Z86.73 HISTORY OF TIA (TRANSIENT ISCHEMIC ATTACK): ICD-10-CM

## 2020-07-11 LAB
25(OH)D3 SERPL-MCNC: 57.8 NG/ML (ref 30–100)
ALBUMIN SERPL BCP-MCNC: 3.9 G/DL (ref 3.5–5)
ALP SERPL-CCNC: 70 U/L (ref 46–116)
ALT SERPL W P-5'-P-CCNC: 21 U/L (ref 12–78)
ANION GAP SERPL CALCULATED.3IONS-SCNC: 4 MMOL/L (ref 4–13)
AST SERPL W P-5'-P-CCNC: 14 U/L (ref 5–45)
BASOPHILS # BLD AUTO: 0.03 THOUSANDS/ΜL (ref 0–0.1)
BASOPHILS NFR BLD AUTO: 1 % (ref 0–1)
BILIRUB SERPL-MCNC: 0.42 MG/DL (ref 0.2–1)
BUN SERPL-MCNC: 15 MG/DL (ref 5–25)
CALCIUM SERPL-MCNC: 9.8 MG/DL (ref 8.3–10.1)
CHLORIDE SERPL-SCNC: 104 MMOL/L (ref 100–108)
CHOLEST SERPL-MCNC: 170 MG/DL (ref 50–200)
CO2 SERPL-SCNC: 30 MMOL/L (ref 21–32)
CREAT SERPL-MCNC: 0.83 MG/DL (ref 0.6–1.3)
EOSINOPHIL # BLD AUTO: 0.13 THOUSAND/ΜL (ref 0–0.61)
EOSINOPHIL NFR BLD AUTO: 2 % (ref 0–6)
ERYTHROCYTE [DISTWIDTH] IN BLOOD BY AUTOMATED COUNT: 12.3 % (ref 11.6–15.1)
GFR SERPL CREATININE-BSD FRML MDRD: 76 ML/MIN/1.73SQ M
GLUCOSE P FAST SERPL-MCNC: 81 MG/DL (ref 65–99)
HCT VFR BLD AUTO: 43.8 % (ref 34.8–46.1)
HDLC SERPL-MCNC: 68 MG/DL
HGB BLD-MCNC: 14.4 G/DL (ref 11.5–15.4)
IMM GRANULOCYTES # BLD AUTO: 0.01 THOUSAND/UL (ref 0–0.2)
IMM GRANULOCYTES NFR BLD AUTO: 0 % (ref 0–2)
LDLC SERPL DIRECT ASSAY-MCNC: 87 MG/DL (ref 0–100)
LYMPHOCYTES # BLD AUTO: 2.01 THOUSANDS/ΜL (ref 0.6–4.47)
LYMPHOCYTES NFR BLD AUTO: 34 % (ref 14–44)
MCH RBC QN AUTO: 30.5 PG (ref 26.8–34.3)
MCHC RBC AUTO-ENTMCNC: 32.9 G/DL (ref 31.4–37.4)
MCV RBC AUTO: 93 FL (ref 82–98)
MONOCYTES # BLD AUTO: 0.6 THOUSAND/ΜL (ref 0.17–1.22)
MONOCYTES NFR BLD AUTO: 10 % (ref 4–12)
NEUTROPHILS # BLD AUTO: 3.13 THOUSANDS/ΜL (ref 1.85–7.62)
NEUTS SEG NFR BLD AUTO: 53 % (ref 43–75)
NRBC BLD AUTO-RTO: 0 /100 WBCS
PLATELET # BLD AUTO: 250 THOUSANDS/UL (ref 149–390)
PMV BLD AUTO: 11.5 FL (ref 8.9–12.7)
POTASSIUM SERPL-SCNC: 4.3 MMOL/L (ref 3.5–5.3)
PROT SERPL-MCNC: 7.7 G/DL (ref 6.4–8.2)
RBC # BLD AUTO: 4.72 MILLION/UL (ref 3.81–5.12)
SODIUM SERPL-SCNC: 138 MMOL/L (ref 136–145)
TRIGL SERPL-MCNC: 69 MG/DL
WBC # BLD AUTO: 5.91 THOUSAND/UL (ref 4.31–10.16)

## 2020-07-11 PROCEDURE — 82306 VITAMIN D 25 HYDROXY: CPT

## 2020-07-11 PROCEDURE — 85025 COMPLETE CBC W/AUTO DIFF WBC: CPT

## 2020-07-11 PROCEDURE — 80061 LIPID PANEL: CPT

## 2020-07-11 PROCEDURE — 80053 COMPREHEN METABOLIC PANEL: CPT

## 2020-07-11 PROCEDURE — 83721 ASSAY OF BLOOD LIPOPROTEIN: CPT

## 2020-07-11 PROCEDURE — 36415 COLL VENOUS BLD VENIPUNCTURE: CPT

## 2020-07-15 ENCOUNTER — OFFICE VISIT (OUTPATIENT)
Dept: INTERNAL MEDICINE CLINIC | Facility: CLINIC | Age: 60
End: 2020-07-15
Payer: COMMERCIAL

## 2020-07-15 VITALS
HEIGHT: 64 IN | WEIGHT: 147.2 LBS | BODY MASS INDEX: 25.13 KG/M2 | SYSTOLIC BLOOD PRESSURE: 118 MMHG | HEART RATE: 72 BPM | DIASTOLIC BLOOD PRESSURE: 72 MMHG | RESPIRATION RATE: 14 BRPM

## 2020-07-15 DIAGNOSIS — E78.5 HYPERLIPIDEMIA, UNSPECIFIED HYPERLIPIDEMIA TYPE: Chronic | ICD-10-CM

## 2020-07-15 DIAGNOSIS — I25.119 CORONARY ARTERY DISEASE WITH ANGINA PECTORIS, UNSPECIFIED VESSEL OR LESION TYPE, UNSPECIFIED WHETHER NATIVE OR TRANSPLANTED HEART (HCC): Primary | ICD-10-CM

## 2020-07-15 DIAGNOSIS — E04.1 THYROID NODULE: ICD-10-CM

## 2020-07-15 DIAGNOSIS — R73.9 HYPERGLYCEMIA: ICD-10-CM

## 2020-07-15 PROCEDURE — 99214 OFFICE O/P EST MOD 30 MIN: CPT | Performed by: INTERNAL MEDICINE

## 2020-07-15 PROCEDURE — 1036F TOBACCO NON-USER: CPT | Performed by: INTERNAL MEDICINE

## 2020-07-15 PROCEDURE — 3008F BODY MASS INDEX DOCD: CPT | Performed by: INTERNAL MEDICINE

## 2020-07-15 NOTE — PROGRESS NOTES
Assessment/Plan:   1  Left eye conjunctiva hemorrhage -counseled on this  If she has any visual symptoms with that she will let us know  Expect gradual resolution  2  Mild atherosclerosis of the aortic arch  I feel because of this she should start a low-dose statin  Cardiology was in agreement  Neurology felt she did not need a statin for secondary stroke prevention  Recommended we consider CT coronary calcium score for additional information and if this is abnormal with then have additional data suggesting she should be on a statin  3  Intermittent fatigue -no obvious etiology  Workup negative including chemistry profile, CBC, thyroid function, B12 level, Lyme titer and cortisol level  This point monitoring  2  Mild atherosclerosis of the aortic arch - cardiology had told her she could stop her statin  She had issues with atorvastatin  A total we should consider low-dose of a hydrophilic statin a-I will contact cardiology to see if they are in agreement with this and then if they are patient can begin generic Crestor 5 milligrams weekly for months and increase to 5 milligrams twice weekly   4 episode of numbness of the left arm with difficulty with speech lasting several minutes   Was admitted with all labs normal, CT angiogram of the head shows only minimal atherosclerosis of the aorta but no significant occlusive disease   MRI the brain normal   All labs normal   Chest x-ray benign   Hypercoagulability workup including factor 5 Leiden, prothrombin gene mutation, protein C, protein S, anticardiolipin antibody, lupus anticoagulant,  All normal   48 hour Holter without arrhythmia   Echo shows EF is 44%, grade 1 diastolic dysfunction, probable patent foramen ovale with positive bubble study   Venous Doppler legs negative for DVT    Because of slight hyperreflexia in the left neurology ordered an MRI to rule out cervical stenosis which showed no significant pathology in only mild DJD   Neurology also wanted to consider potential focal sensory seizure  As there was slight tendency towards marching of symptoms- they recommended at this point we not close the PFO as she was not on any antiplatelet therapy or statin therapy at the time of the event   Also need to consider the possibility of complicated migraine but is noted patient had not had any vascular headaches in years  -at this point she will continue her aspirin therapy   We are switching statins as dictated below  Radha Malave is seeing Neurology over the next several months and is scheduled for an EEG  5  Hyperlipidemia - felt poorly on atorvastatin -cardiology felt she could stop her statin -at this point considering low-dose Crestor -await opinion of Cardiology  6   Infrequent headache -did not have associated nausea  Or photophobia- watching for recurrence of vascular headaches but at this point monitoring  7  PFO -questionable clinical significance -she is aware  This can be an incidental finding -at this point Neurology does not feel we should treat this - Cardiology also feels we should not treat it  8  Left thyroid nodule measuring less than 1 5 centimeters   -Radiology felt this did not need additional valuation although we will consider repeat ultrasound of the thyroid in the year which would be in the fall of 2020- scheduled for follow-up thyroid ultrasound over the next couple months    All other problems as per note of February 2020       MEDICAL REGIMEN:      A baby aspirin daily, spironolactone 50 milligrams 2 p o  B i d , multivitamin, vitamin D3 / 2000 units a day, vitamin-D -38966 units monthly, doxycycline 50 milligrams used intermittently for skin disease     Scheduled for thyroid ultrasound  Patient considering CT coronary calcium score    Appointment in 6 months with prior chemistry profile cholesterol profile    Addendum-thyroid ultrasound done in July 2020 shows right lobe nodule up to 1 5 centimeters, left lobe nodule up to 0 7 centimeters and another 1 up to 1 2 centimeters- none of the nodules meets criteria for biopsy -will need repeat thyroid ultrasound in 12-18 months    Addendum-patient was called with results of her thyroid ultrasound and requested TSH at this point because of ongoing fatigue    Addendum-TSH done on July 22nd normal  No problem-specific Assessment & Plan notes found for this encounter  Diagnoses and all orders for this visit:    Coronary artery disease with angina pectoris, unspecified vessel or lesion type, unspecified whether native or transplanted heart ([facility] [address] )  -     Cancel: CT coronary calcium score; Future    Thyroid nodule  -     US thyroid; Future  -     Comprehensive metabolic panel; Future  -     Cholesterol, total; Future  -     HDL cholesterol; Future  -     LDL cholesterol, direct; Future  -     Triglycerides; Future  -     TSH, 3rd generation; Future    Hyperglycemia    Hyperlipidemia, unspecified hyperlipidemia type  -     Comprehensive metabolic panel; Future  -     Cholesterol, total; Future  -     HDL cholesterol; Future  -     LDL cholesterol, direct; Future  -     Triglycerides; Future  -     TSH, 3rd generation; Future    Other orders  -     triamcinolone (KENALOG) 0 1 % ointment          Subjective:      Patient ID: Ketan Herrera is a 63 y o  female  We discussed the coronavirus pandemic in detail  Multiple questions were answered  The she saw neurology who felt she did not need a statin for secondary stroke prevention  However as noted she had mild atherosclerosis of the aortic arch when she had her radiographic study  I still feel she should consider a statin  We talked about doing a CT coronary calcium score for additional information  We reviewed the pros and cons of this  She wants to think about it and let me know        Results for orders placed or performed in visit on 07/11/20  -CBC and differential       Result                      Value             Ref Range           WBC 5 91              4 31 - 10 16*       RBC                         4 72              3 81 - 5 12 *       Hemoglobin                  14 4              11 5 - 15 4 *       Hematocrit                  43 8              34 8 - 46 1 %       MCV                         93                82 - 98 fL          MCH                         30 5              26 8 - 34 3 *       MCHC                        32 9              31 4 - 37 4 *       RDW                         12 3              11 6 - 15 1 %       MPV                         11 5              8 9 - 12 7 fL       Platelets                   250               149 - 390 Th*       nRBC                        0                 /100 WBCs           Neutrophils Relative        53                43 - 75 %           Immat GRANS %               0                 0 - 2 %             Lymphocytes Relative        34                14 - 44 %           Monocytes Relative          10                4 - 12 %            Eosinophils Relative        2                 0 - 6 %             Basophils Relative          1                 0 - 1 %             Neutrophils Absolute        3 13              1 85 - 7 62 *       Immature Grans Absolute     0 01              0 00 - 0 20 *       Lymphocytes Absolute        2 01              0 60 - 4 47 *       Monocytes Absolute          0 60              0 17 - 1 22 *       Eosinophils Absolute        0 13              0 00 - 0 61 *       Basophils Absolute          0 03              0 00 - 0 10 *  -Comprehensive metabolic panel       Result                      Value             Ref Range           Sodium                      138               136 - 145 mm*       Potassium                   4 3               3 5 - 5 3 mm*       Chloride                    104               100 - 108 mm*       CO2                         30                21 - 32 mmol*       ANION GAP                   4                 4 - 13 mmol/L       BUN 15                5 - 25 mg/dL        Creatinine                  0 83              0 60 - 1 30 *       Glucose, Fasting            81                65 - 99 mg/dL       Calcium                     9 8               8 3 - 10 1 m*       AST                         14                5 - 45 U/L          ALT                         21                12 - 78 U/L         Alkaline Phosphatase        70                46 - 116 U/L        Total Protein               7 7               6 4 - 8 2 g/*       Albumin                     3 9               3 5 - 5 0 g/*       Total Bilirubin             0 42              0 20 - 1 00 *       eGFR                        76                ml/min/1 73s*  -Cholesterol, total       Result                      Value             Ref Range           Cholesterol                 170               50 - 200 mg/*  -HDL cholesterol       Result                      Value             Ref Range           HDL, Direct                 68                >=40 mg/dL     -LDL cholesterol, direct       Result                      Value             Ref Range           LDL Direct                  87                0 - 100 mg/dl  -Triglycerides       Result                      Value             Ref Range           Triglycerides               69                <=150 mg/dL    -Vitamin D 25 hydroxy       Result                      Value             Ref Range           Vit D, 25-Hydroxy           57 8              30 0 - 100  0*As noted recent vitamin-D level is therapeutic  She is bothered much by having wear her mask at work much of the day- she wanted to know about the pros and cons of the COVID antibody testing we reviewed that as well  She has a history of thyroid nodule  She is due for follow-up thyroid ultrasound of this was scheduled  TSH also ordered prior to next visit        I had communicated with Cardiology after last appointment and they were in agreement with starting statin  This patient denies any systemic symptoms  Specifically there has been no evidence of fever, night sweats, significant weight loss or significant decrease in appetite  She had a recent episode of conjunctiva hemorrhage  She has no symptoms associated with this      The following portions of the patient's history were reviewed and updated as appropriate: allergies, current medications, past family history, past medical history, past social history, past surgical history and problem list     Review of Systems   Constitutional: Negative  Respiratory: Negative  Cardiovascular: Negative  Gastrointestinal: Negative  Endocrine: Negative  Genitourinary: Negative  Musculoskeletal: Negative  Neurological: Negative  Hematological: Negative  Psychiatric/Behavioral: Negative  Objective:      Ht 5' 4" (1 626 m)   Wt 66 8 kg (147 lb 3 2 oz)   BMI 25 27 kg/m²          Physical Exam   Constitutional: She is oriented to person, place, and time  She appears well-developed and well-nourished  No distress  HENT:   Head: Normocephalic and atraumatic  Right Ear: External ear normal    Left Ear: External ear normal    Nose: Nose normal    Mouth/Throat: Oropharynx is clear and moist  No oropharyngeal exudate  Eyes: Pupils are equal, round, and reactive to light  Conjunctivae and EOM are normal  Right eye exhibits no discharge  Left eye exhibits no discharge  No scleral icterus  Conjunctiva hemorrhage of the left eye   Neck: Normal range of motion  Neck supple  No JVD present  No tracheal deviation present  No thyromegaly present  Cardiovascular: Normal rate, regular rhythm, normal heart sounds and intact distal pulses  Exam reveals no gallop and no friction rub  No murmur heard  Pulmonary/Chest: Effort normal and breath sounds normal  No respiratory distress  She has no wheezes  She has no rales  She exhibits no tenderness  Abdominal: Soft   Bowel sounds are normal  She exhibits no distension and no mass  There is no tenderness  There is no rebound and no guarding  Musculoskeletal: Normal range of motion  She exhibits no edema or deformity  Lymphadenopathy:     She has no cervical adenopathy  Neurological: She is alert and oriented to person, place, and time  She has normal reflexes  She displays normal reflexes  No cranial nerve deficit  She exhibits normal muscle tone  Coordination normal    Skin: Skin is warm and dry  No rash noted  No erythema  Psychiatric: She has a normal mood and affect  Her behavior is normal  Judgment and thought content normal    Vitals reviewed

## 2020-07-16 ENCOUNTER — HOSPITAL ENCOUNTER (OUTPATIENT)
Dept: RADIOLOGY | Age: 60
Discharge: HOME | End: 2020-07-16
Payer: COMMERCIAL

## 2020-07-16 DIAGNOSIS — E04.1 THYROID NODULE: ICD-10-CM

## 2020-07-16 PROCEDURE — 76536 US EXAM OF HEAD AND NECK: CPT

## 2020-07-22 ENCOUNTER — LAB (OUTPATIENT)
Dept: LAB | Age: 60
End: 2020-07-22
Payer: COMMERCIAL

## 2020-07-22 ENCOUNTER — TELEPHONE (OUTPATIENT)
Dept: INTERNAL MEDICINE CLINIC | Facility: CLINIC | Age: 60
End: 2020-07-22

## 2020-07-22 DIAGNOSIS — E04.1 THYROID NODULE: Primary | ICD-10-CM

## 2020-07-22 DIAGNOSIS — E04.1 THYROID NODULE: ICD-10-CM

## 2020-07-22 LAB — TSH SERPL DL<=0.05 MIU/L-ACNC: 1.43 UIU/ML (ref 0.36–3.74)

## 2020-07-22 PROCEDURE — 36415 COLL VENOUS BLD VENIPUNCTURE: CPT

## 2020-07-22 PROCEDURE — 84443 ASSAY THYROID STIM HORMONE: CPT

## 2020-07-22 NOTE — TELEPHONE ENCOUNTER
There is no reason to start on thyroid medication on less thyroid blood work is abnormal   The overwhelming majority of people with thyroid nodules do not have abnormal thyroid blood work -she is scheduled for a TSH prior to next visit-if she is concerned about her fatigue she should go for that blood work now -schedule her for TSH - no fast with diagnosis of thyroid nodule

## 2020-07-22 NOTE — TELEPHONE ENCOUNTER
----- Message from Paul Tineo MD sent at 7/22/2020  5:46 AM EDT -----   Please call patient -thyroid ultrasound shows small nodules of both lobes of the thyroid-   Nodules are felt to be benign by all ultrasound criteria -she will need repeat thyroid ultrasound in 12-18 months

## 2020-07-22 NOTE — TELEPHONE ENCOUNTER
Pt called back she wants to know if there should be any reason to start on thyroid medication  States she is feeling fatigued every day and unable to get her weight down  Please call pt at work [phone]  In a meeting between 11:30 and 12:30pm   Please advise   Thanks

## 2020-07-23 ENCOUNTER — TELEPHONE (OUTPATIENT)
Dept: INTERNAL MEDICINE CLINIC | Facility: CLINIC | Age: 60
End: 2020-07-23

## 2020-07-23 NOTE — TELEPHONE ENCOUNTER
----- Message from Bill Zambrano MD sent at 7/23/2020  5:14 AM EDT -----  Please call the patient regarding her  Thyroid blood work-normal

## 2020-09-01 PROCEDURE — 88305 TISSUE EXAM BY PATHOLOGIST: CPT | Performed by: PATHOLOGY

## 2020-09-02 ENCOUNTER — LAB REQUISITION (OUTPATIENT)
Dept: LAB | Facility: HOSPITAL | Age: 60
End: 2020-09-02
Payer: COMMERCIAL

## 2020-09-02 DIAGNOSIS — D48.5 NEOPLASM OF UNCERTAIN BEHAVIOR OF SKIN: ICD-10-CM

## 2020-11-19 ENCOUNTER — TELEPHONE (OUTPATIENT)
Dept: INTERNAL MEDICINE CLINIC | Facility: CLINIC | Age: 60
End: 2020-11-19

## 2020-11-19 ENCOUNTER — TELEMEDICINE (OUTPATIENT)
Dept: INTERNAL MEDICINE CLINIC | Facility: CLINIC | Age: 60
End: 2020-11-19
Payer: COMMERCIAL

## 2020-11-19 DIAGNOSIS — R51.9 NONINTRACTABLE HEADACHE, UNSPECIFIED CHRONICITY PATTERN, UNSPECIFIED HEADACHE TYPE: ICD-10-CM

## 2020-11-19 DIAGNOSIS — R51.9 NONINTRACTABLE HEADACHE, UNSPECIFIED CHRONICITY PATTERN, UNSPECIFIED HEADACHE TYPE: Primary | ICD-10-CM

## 2020-11-19 PROCEDURE — U0003 INFECTIOUS AGENT DETECTION BY NUCLEIC ACID (DNA OR RNA); SEVERE ACUTE RESPIRATORY SYNDROME CORONAVIRUS 2 (SARS-COV-2) (CORONAVIRUS DISEASE [COVID-19]), AMPLIFIED PROBE TECHNIQUE, MAKING USE OF HIGH THROUGHPUT TECHNOLOGIES AS DESCRIBED BY CMS-2020-01-R: HCPCS | Performed by: INTERNAL MEDICINE

## 2020-11-19 PROCEDURE — 99441 PR PHYS/QHP TELEPHONE EVALUATION 5-10 MIN: CPT | Performed by: INTERNAL MEDICINE

## 2020-11-20 LAB — SARS-COV-2 RNA SPEC QL NAA+PROBE: NOT DETECTED

## 2020-11-25 ENCOUNTER — ANNUAL EXAM (OUTPATIENT)
Dept: OBGYN CLINIC | Facility: CLINIC | Age: 60
End: 2020-11-25
Payer: COMMERCIAL

## 2020-11-25 VITALS — WEIGHT: 146 LBS | BODY MASS INDEX: 25.06 KG/M2 | DIASTOLIC BLOOD PRESSURE: 74 MMHG | SYSTOLIC BLOOD PRESSURE: 118 MMHG

## 2020-11-25 DIAGNOSIS — Z12.31 ENCOUNTER FOR SCREENING MAMMOGRAM FOR MALIGNANT NEOPLASM OF BREAST: ICD-10-CM

## 2020-11-25 DIAGNOSIS — Z01.419 ENCNTR FOR GYN EXAM (GENERAL) (ROUTINE) W/O ABN FINDINGS: Primary | ICD-10-CM

## 2020-11-25 DIAGNOSIS — R21 RASH: ICD-10-CM

## 2020-11-25 DIAGNOSIS — N64.4 BREAST TENDERNESS: ICD-10-CM

## 2020-11-25 DIAGNOSIS — Z01.419 PAP SMEAR, AS PART OF ROUTINE GYNECOLOGICAL EXAMINATION: ICD-10-CM

## 2020-11-25 PROCEDURE — 99396 PREV VISIT EST AGE 40-64: CPT | Performed by: OBSTETRICS & GYNECOLOGY

## 2020-11-25 PROCEDURE — G0145 SCR C/V CYTO,THINLAYER,RESCR: HCPCS | Performed by: OBSTETRICS & GYNECOLOGY

## 2020-11-30 LAB
LAB AP GYN PRIMARY INTERPRETATION: NORMAL
Lab: NORMAL

## 2020-12-14 DIAGNOSIS — E55.9 VITAMIN D DEFICIENCY: Primary | ICD-10-CM

## 2020-12-14 RX ORDER — ERGOCALCIFEROL 1.25 MG/1
CAPSULE ORAL
Qty: 3 CAPSULE | Refills: 3 | Status: SHIPPED | OUTPATIENT
Start: 2020-12-14 | End: 2022-06-20

## 2020-12-22 ENCOUNTER — IMMUNIZATIONS (OUTPATIENT)
Dept: FAMILY MEDICINE CLINIC | Facility: HOSPITAL | Age: 60
End: 2020-12-22

## 2020-12-22 ENCOUNTER — IMMUNIZATIONS (OUTPATIENT)
Dept: FAMILY MEDICINE CLINIC | Facility: HOSPITAL | Age: 60
End: 2020-12-22
Payer: COMMERCIAL

## 2020-12-22 DIAGNOSIS — Z23 ENCOUNTER FOR IMMUNIZATION: ICD-10-CM

## 2020-12-22 PROCEDURE — 0001A SARS-COV-2 / COVID-19 MRNA VACCINE (PFIZER-BIONTECH) 30 MCG: CPT

## 2020-12-22 PROCEDURE — 91300 SARS-COV-2 / COVID-19 MRNA VACCINE (PFIZER-BIONTECH) 30 MCG: CPT

## 2021-01-11 ENCOUNTER — IMMUNIZATIONS (OUTPATIENT)
Dept: FAMILY MEDICINE CLINIC | Facility: HOSPITAL | Age: 61
End: 2021-01-11

## 2021-01-11 DIAGNOSIS — Z23 ENCOUNTER FOR IMMUNIZATION: ICD-10-CM

## 2021-01-11 PROCEDURE — 91300 SARS-COV-2 / COVID-19 MRNA VACCINE (PFIZER-BIONTECH) 30 MCG: CPT

## 2021-01-11 PROCEDURE — 0002A SARS-COV-2 / COVID-19 MRNA VACCINE (PFIZER-BIONTECH) 30 MCG: CPT

## 2021-01-30 ENCOUNTER — LAB (OUTPATIENT)
Dept: LAB | Facility: MEDICAL CENTER | Age: 61
End: 2021-01-30
Payer: COMMERCIAL

## 2021-01-30 DIAGNOSIS — E78.5 HYPERLIPIDEMIA, UNSPECIFIED HYPERLIPIDEMIA TYPE: Chronic | ICD-10-CM

## 2021-01-30 DIAGNOSIS — E04.1 THYROID NODULE: ICD-10-CM

## 2021-01-30 LAB
ALBUMIN SERPL BCP-MCNC: 4.2 G/DL (ref 3.5–5)
ALP SERPL-CCNC: 78 U/L (ref 46–116)
ALT SERPL W P-5'-P-CCNC: 57 U/L (ref 12–78)
ANION GAP SERPL CALCULATED.3IONS-SCNC: 3 MMOL/L (ref 4–13)
AST SERPL W P-5'-P-CCNC: 34 U/L (ref 5–45)
BILIRUB SERPL-MCNC: 0.55 MG/DL (ref 0.2–1)
BUN SERPL-MCNC: 12 MG/DL (ref 5–25)
CALCIUM SERPL-MCNC: 9.7 MG/DL (ref 8.3–10.1)
CHLORIDE SERPL-SCNC: 105 MMOL/L (ref 100–108)
CHOLEST SERPL-MCNC: 189 MG/DL (ref 50–200)
CO2 SERPL-SCNC: 31 MMOL/L (ref 21–32)
CREAT SERPL-MCNC: 0.77 MG/DL (ref 0.6–1.3)
GFR SERPL CREATININE-BSD FRML MDRD: 84 ML/MIN/1.73SQ M
GLUCOSE P FAST SERPL-MCNC: 82 MG/DL (ref 65–99)
HDLC SERPL-MCNC: 73 MG/DL
LDLC SERPL DIRECT ASSAY-MCNC: 105 MG/DL (ref 0–100)
POTASSIUM SERPL-SCNC: 4.8 MMOL/L (ref 3.5–5.3)
PROT SERPL-MCNC: 7.7 G/DL (ref 6.4–8.2)
SODIUM SERPL-SCNC: 139 MMOL/L (ref 136–145)
TRIGL SERPL-MCNC: 80 MG/DL
TSH SERPL DL<=0.05 MIU/L-ACNC: 1.58 UIU/ML (ref 0.36–3.74)

## 2021-01-30 PROCEDURE — 83721 ASSAY OF BLOOD LIPOPROTEIN: CPT

## 2021-01-30 PROCEDURE — 36415 COLL VENOUS BLD VENIPUNCTURE: CPT

## 2021-01-30 PROCEDURE — 84443 ASSAY THYROID STIM HORMONE: CPT

## 2021-01-30 PROCEDURE — 80053 COMPREHEN METABOLIC PANEL: CPT

## 2021-01-30 PROCEDURE — 80061 LIPID PANEL: CPT

## 2021-02-05 ENCOUNTER — OFFICE VISIT (OUTPATIENT)
Dept: INTERNAL MEDICINE CLINIC | Facility: CLINIC | Age: 61
End: 2021-02-05
Payer: COMMERCIAL

## 2021-02-05 VITALS — DIASTOLIC BLOOD PRESSURE: 78 MMHG | SYSTOLIC BLOOD PRESSURE: 120 MMHG | RESPIRATION RATE: 14 BRPM | HEART RATE: 72 BPM

## 2021-02-05 DIAGNOSIS — E04.1 THYROID NODULE: ICD-10-CM

## 2021-02-05 DIAGNOSIS — E78.5 HYPERLIPIDEMIA, UNSPECIFIED HYPERLIPIDEMIA TYPE: Chronic | ICD-10-CM

## 2021-02-05 DIAGNOSIS — R21 SKIN RASH: ICD-10-CM

## 2021-02-05 DIAGNOSIS — E55.9 VITAMIN D DEFICIENCY: Chronic | ICD-10-CM

## 2021-02-05 DIAGNOSIS — R53.83 FATIGUE, UNSPECIFIED TYPE: ICD-10-CM

## 2021-02-05 PROCEDURE — 99214 OFFICE O/P EST MOD 30 MIN: CPT | Performed by: INTERNAL MEDICINE

## 2021-02-05 RX ORDER — CLOTRIMAZOLE AND BETAMETHASONE DIPROPIONATE 10; .64 MG/G; MG/G
CREAM TOPICAL 2 TIMES DAILY
Qty: 60 G | Refills: 1 | Status: SHIPPED | OUTPATIENT
Start: 2021-02-05 | End: 2022-06-20

## 2021-02-05 NOTE — PROGRESS NOTES
BMI Counseling: There is no height or weight on file to calculate BMI  The BMI is above normal  Nutrition recommendations include decreasing portion sizes and moderation in carbohydrate intake  Exercise recommendations include exercising 3-5 times per week  Assessment/Plan:   1  Skin rash of the breast -may be fungal in origin  She will apply Lotrisone cream b i d  and see how she does  2  Mild atherosclerosis of the aortic arch  I feel because of this she should start a low-dose statin  Cardiology was in agreement  Neurology felt she did not need a statin for secondary stroke prevention  Recommended we consider CT coronary calcium score for additional information and if this is abnormal with then have additional data suggesting she should be on a statin -she elected not to proceed with this and at this point is holding off on statin use  3  Intermittent fatigue -no obvious etiology   Workup negative including chemistry profile, CBC, thyroid function, B12 level, Lyme titer and cortisol level   This point monitoring  2  Mild atherosclerosis of the aortic arch - cardiology had told her she could stop her statin  Laura Mendoza had issues with atorvastatin    I recommended she try hydrophilic statin but she has opted against this   4 episode of numbness of the left arm with difficulty with speech lasting several minutes   Was admitted with all labs normal, CT angiogram of the head shows only minimal atherosclerosis of the aorta but no significant occlusive disease   MRI the brain normal   All labs normal   Chest x-ray benign   Hypercoagulability workup including factor 5 Leiden, prothrombin gene mutation, protein C, protein S, anticardiolipin antibody, lupus anticoagulant,  All normal   48 hour Holter without arrhythmia   Echo shows EF is 31%, grade 1 diastolic dysfunction, probable patent foramen ovale with positive bubble study   Venous Doppler legs negative for DVT    Because of slight hyperreflexia in the left neurology ordered an MRI to rule out cervical stenosis which showed no significant pathology in only mild DJD   Neurology also wanted to consider potential focal sensory seizure  As there was slight tendency towards marching of symptoms- they recommended at this point we not close the PFO as she was not on any antiplatelet therapy or statin therapy at the time of the event   Also need to consider the possibility of complicated migraine but is noted patient had not had any vascular headaches in years  -at this point she will continue her aspirin therapy    denies further symptoms  5  Hyperlipidemia - felt poorly on atorvastatin -cardiology felt she could stop her statin -at this point considering low-  But patient defers -for recheck of lipid profile prior to next visit  6   Infrequent headache -did not have associated nausea  Or photophobia- watching for recurrence of vascular headaches but at this point monitoring  7  PFO -questionable clinical significance -she is aware  This can be an incidental finding -at this point Neurology does not feel we should treat this - Cardiology also feels we should not treat it  8  Left thyroid nodule measuring less than 1 5 centimeters   - ultrasound done in July shows right  Lobe nodule of 1 5 centimeters, left lobe nodule up to 0 7 centimeters and another up to 1 2 centimeters -none met criteria for biopsy    Needs repeat ultrasound in 12-18 months which would be July of 2020 1 January 2022     All other problems as per note of February 2020         MEDICAL REGIMEN:                                                   Lotrisone cream b i d  p r n  to rash of breastA baby aspirin daily, spironolactone 50 milligrams 2 p o  B i d , multivitamin, vitamin D3 / 2000 units a day, vitamin-D -50404 units monthly, doxycycline 50 milligrams used intermittently for skin disease     Appointment in 6 months with prior chemistry profile cholesterol profile vitamin-D level a    Addendum- mammogram done in May 2021 normal- OZZ[facility] CHART NEXT VISIT a a     No problem-specific Assessment & Plan notes found for this encounter  Diagnoses and all orders for this visit:    Hyperlipidemia, unspecified hyperlipidemia type  -     Comprehensive metabolic panel; Future  -     Cholesterol, total; Future  -     HDL cholesterol; Future  -     LDL cholesterol, direct; Future  -     Triglycerides; Future  -     Vitamin D 25 hydroxy; Future    Fatigue, unspecified type  -     Comprehensive metabolic panel; Future  -     Cholesterol, total; Future  -     HDL cholesterol; Future  -     LDL cholesterol, direct; Future  -     Triglycerides; Future  -     Vitamin D 25 hydroxy; Future    Vitamin D deficiency  -     Comprehensive metabolic panel; Future  -     Cholesterol, total; Future  -     HDL cholesterol; Future  -     LDL cholesterol, direct; Future  -     Triglycerides; Future  -     Vitamin D 25 hydroxy; Future         Skin rash  -     clotrimazole-betamethasone (LOTRISONE) 1-0 05 % cream; Apply topically 2 (two) times a day          Subjective:      Patient ID: Redd Ovalle is a 63 y o  female  A she has a rash of her breast  She asked me to evaluate  This may be fungal in origin  She had tried topical steroid without benefit  She will apply Lotrisone cream and see how she does     She opted not to go on statin therapy  She has not had any recurrent neurologic symptoms  She denies weakness of 1 arm and leg compared to the other  She denies numbness of 1 arm and leg compared to the other  She denies blurred or double vision or difficulty with speech  This patient denies any systemic symptoms  Specifically there has been no evidence of fever, night sweats, significant weight loss or significant decrease in appetite        She had a thyroid ultrasound done in July of 2020 showing right lobe nodule up to 1 5 centimeters, left lobe nodule up to 0 7 centimeters another nodule up to 1-1 2 centimeters -none of the nodules meets criteria for biopsy  We reviewed that she should have repeat thyroid ultrasound in 12-18 months  She had no definite palpable nodule on exam today  She had TSH done July that was normal       She has a history of infrequent headaches- watching for recurrent vascular headaches but not an issue at present    She has not had any recurrence of previously noted left eye conjunctiva hemorrhage  Vaccine status reviewed  She had 2 doses of Shingrix  She   A had 2 doses of COVID vaccine  She denies any major anxiety or depressive symptoms  She he is hoping to exercise more the future as the pandemic decreases  She has known vitamin-D deficiency  She remains on daily vitamin-D plus monthly supplementation  Level ordered prior to next visit  A      The following portions of the patient's history were reviewed and updated as appropriate: allergies, current medications, past family history, past medical history, past social history, past surgical history and problem list     Review of Systems   Constitutional: Negative  HENT: Negative  Respiratory: Negative  Cardiovascular: Negative  Gastrointestinal: Negative  Endocrine: Negative  Genitourinary: Negative  Musculoskeletal: Negative  Skin: Positive for rash  Neurological: Negative  Hematological: Negative  Psychiatric/Behavioral: Negative  Objective:      /78   Pulse 72   Resp 14          Physical Exam  Vitals signs reviewed  Constitutional:       General: She is not in acute distress  Appearance: Normal appearance  She is not ill-appearing, toxic-appearing or diaphoretic  HENT:      Head: Normocephalic and atraumatic  Right Ear: Tympanic membrane, ear canal and external ear normal       Left Ear: Tympanic membrane, ear canal and external ear normal       Nose: Nose normal  No congestion or rhinorrhea        Mouth/Throat:      Mouth: Mucous membranes are moist  Pharynx: Oropharynx is clear  No oropharyngeal exudate or posterior oropharyngeal erythema  Eyes:      General: No scleral icterus  Right eye: No discharge  Left eye: No discharge  Extraocular Movements: Extraocular movements intact  Pupils: Pupils are equal, round, and reactive to light  Neck:      Musculoskeletal: Normal range of motion and neck supple  No neck rigidity or muscular tenderness  Vascular: No carotid bruit  Cardiovascular:      Rate and Rhythm: Normal rate and regular rhythm  Pulses: Normal pulses  Heart sounds: Normal heart sounds  No murmur  No friction rub  No gallop  Pulmonary:      Effort: Pulmonary effort is normal  No respiratory distress  Breath sounds: Normal breath sounds  No stridor  No wheezing, rhonchi or rales  Chest:      Chest wall: No tenderness  Abdominal:      General: Abdomen is flat  Bowel sounds are normal  There is no distension  Palpations: Abdomen is soft  There is no mass  Tenderness: There is no abdominal tenderness  There is no right CVA tenderness, left CVA tenderness, guarding or rebound  Hernia: No hernia is present  Musculoskeletal: Normal range of motion  General: No swelling, tenderness, deformity or signs of injury  Right lower leg: No edema  Left lower leg: No edema  Lymphadenopathy:      Cervical: No cervical adenopathy  Skin:     General: Skin is warm and dry  Coloration: Skin is not jaundiced or pale  Findings: No bruising, erythema, lesion or rash  Comments: Erythematous rash of the right breast   Neurological:      General: No focal deficit present  Mental Status: She is alert and oriented to person, place, and time  Mental status is at baseline  Cranial Nerves: No cranial nerve deficit  Sensory: No sensory deficit  Motor: No weakness        Coordination: Coordination normal       Gait: Gait normal       Deep Tendon Reflexes: Reflexes normal    Psychiatric:         Mood and Affect: Mood normal          Behavior: Behavior normal          Thought Content:  Thought content normal          Judgment: Judgment normal

## 2021-04-07 ENCOUNTER — APPOINTMENT (OUTPATIENT)
Dept: LAB | Age: 61
End: 2021-04-07

## 2021-04-07 ENCOUNTER — TRANSCRIBE ORDERS (OUTPATIENT)
Dept: ADMINISTRATIVE | Age: 61
End: 2021-04-07

## 2021-04-07 DIAGNOSIS — Z00.8 HEALTH EXAMINATION IN POPULATION SURVEY: Primary | ICD-10-CM

## 2021-04-07 DIAGNOSIS — Z00.8 HEALTH EXAMINATION IN POPULATION SURVEY: ICD-10-CM

## 2021-04-07 LAB
EST. AVERAGE GLUCOSE BLD GHB EST-MCNC: 103 MG/DL
HBA1C MFR BLD: 5.2 %

## 2021-04-07 PROCEDURE — 36415 COLL VENOUS BLD VENIPUNCTURE: CPT

## 2021-04-07 PROCEDURE — 83036 HEMOGLOBIN GLYCOSYLATED A1C: CPT

## 2021-05-26 ENCOUNTER — HOSPITAL ENCOUNTER (OUTPATIENT)
Dept: RADIOLOGY | Age: 61
Discharge: HOME | End: 2021-05-26
Payer: COMMERCIAL

## 2021-05-26 VITALS — BODY MASS INDEX: 24.92 KG/M2 | WEIGHT: 146 LBS | HEIGHT: 64 IN

## 2021-05-26 DIAGNOSIS — Z12.31 ENCOUNTER FOR SCREENING MAMMOGRAM FOR MALIGNANT NEOPLASM OF BREAST: ICD-10-CM

## 2021-05-26 PROCEDURE — 77067 SCR MAMMO BI INCL CAD: CPT

## 2021-05-26 PROCEDURE — 77063 BREAST TOMOSYNTHESIS BI: CPT

## 2021-08-20 ENCOUNTER — APPOINTMENT (OUTPATIENT)
Dept: LAB | Facility: MEDICAL CENTER | Age: 61
End: 2021-08-20
Payer: COMMERCIAL

## 2021-08-20 DIAGNOSIS — E78.5 HYPERLIPIDEMIA, UNSPECIFIED HYPERLIPIDEMIA TYPE: Chronic | ICD-10-CM

## 2021-08-20 DIAGNOSIS — R53.83 FATIGUE, UNSPECIFIED TYPE: ICD-10-CM

## 2021-08-20 DIAGNOSIS — E55.9 VITAMIN D DEFICIENCY: Chronic | ICD-10-CM

## 2021-08-20 LAB
25(OH)D3 SERPL-MCNC: 97.9 NG/ML (ref 30–100)
ALBUMIN SERPL BCP-MCNC: 3.9 G/DL (ref 3.5–5)
ALP SERPL-CCNC: 72 U/L (ref 46–116)
ALT SERPL W P-5'-P-CCNC: 26 U/L (ref 12–78)
ANION GAP SERPL CALCULATED.3IONS-SCNC: 0 MMOL/L (ref 4–13)
AST SERPL W P-5'-P-CCNC: 16 U/L (ref 5–45)
BILIRUB SERPL-MCNC: 0.55 MG/DL (ref 0.2–1)
BUN SERPL-MCNC: 14 MG/DL (ref 5–25)
CALCIUM SERPL-MCNC: 9.4 MG/DL (ref 8.3–10.1)
CHLORIDE SERPL-SCNC: 108 MMOL/L (ref 100–108)
CHOLEST SERPL-MCNC: 199 MG/DL (ref 50–200)
CO2 SERPL-SCNC: 29 MMOL/L (ref 21–32)
CREAT SERPL-MCNC: 0.81 MG/DL (ref 0.6–1.3)
GFR SERPL CREATININE-BSD FRML MDRD: 78 ML/MIN/1.73SQ M
GLUCOSE P FAST SERPL-MCNC: 83 MG/DL (ref 65–99)
HDLC SERPL-MCNC: 76 MG/DL
LDLC SERPL DIRECT ASSAY-MCNC: 98 MG/DL (ref 0–100)
POTASSIUM SERPL-SCNC: 5.1 MMOL/L (ref 3.5–5.3)
PROT SERPL-MCNC: 7.7 G/DL (ref 6.4–8.2)
SODIUM SERPL-SCNC: 137 MMOL/L (ref 136–145)
TRIGL SERPL-MCNC: 80 MG/DL

## 2021-08-20 PROCEDURE — 82306 VITAMIN D 25 HYDROXY: CPT

## 2021-08-20 PROCEDURE — 80061 LIPID PANEL: CPT

## 2021-08-20 PROCEDURE — 83721 ASSAY OF BLOOD LIPOPROTEIN: CPT

## 2021-08-20 PROCEDURE — 36415 COLL VENOUS BLD VENIPUNCTURE: CPT

## 2021-08-20 PROCEDURE — 80053 COMPREHEN METABOLIC PANEL: CPT

## 2021-08-27 ENCOUNTER — OFFICE VISIT (OUTPATIENT)
Dept: INTERNAL MEDICINE CLINIC | Facility: CLINIC | Age: 61
End: 2021-08-27
Payer: COMMERCIAL

## 2021-08-27 VITALS
BODY MASS INDEX: 24.14 KG/M2 | DIASTOLIC BLOOD PRESSURE: 72 MMHG | WEIGHT: 141.4 LBS | SYSTOLIC BLOOD PRESSURE: 118 MMHG | HEART RATE: 68 BPM | HEIGHT: 64 IN | RESPIRATION RATE: 12 BRPM

## 2021-08-27 DIAGNOSIS — K63.5 POLYP OF COLON, UNSPECIFIED PART OF COLON, UNSPECIFIED TYPE: Chronic | ICD-10-CM

## 2021-08-27 DIAGNOSIS — E55.9 VITAMIN D DEFICIENCY: Chronic | ICD-10-CM

## 2021-08-27 DIAGNOSIS — Q21.1 PATENT FORAMEN OVALE: Primary | Chronic | ICD-10-CM

## 2021-08-27 DIAGNOSIS — E78.5 HYPERLIPIDEMIA, UNSPECIFIED HYPERLIPIDEMIA TYPE: Chronic | ICD-10-CM

## 2021-08-27 PROCEDURE — 99214 OFFICE O/P EST MOD 30 MIN: CPT | Performed by: INTERNAL MEDICINE

## 2021-08-27 NOTE — PROGRESS NOTES
Assessment/Plan:  Assessment/Plan:   1  A health maintenance -patient completed COVID vaccine- we reviewed the literature regarding potential booster 8 months after vaccine 2nd dose  2  Mild atherosclerosis of the aortic arch   I feel because of this she should start a low-dose statin   Cardiology was in agreement   Neurology felt she did not need a statin for secondary stroke prevention   Recommended we consider CT coronary calcium score for additional information and if this is abnormal with then have additional data suggesting she should be on a statin -she elected not to proceed with this and at this point is holding off on statin use  3  Intermittent fatigue -no obvious etiology   Workup negative including chemistry profile, CBC, thyroid function, B12 level, Lyme titer and cortisol level   This point monitoring  2  Mild atherosclerosis of the aortic arch - cardiology had told her she could stop her statin  Inge Moise had issues with atorvastatin    I recommended she try hydrophilic statin but she has opted against this   4 episode of numbness of the left arm with difficulty with speech lasting several minutes   Was admitted with all labs normal, CT angiogram of the head shows only minimal atherosclerosis of the aorta but no significant occlusive disease   MRI the brain normal   All labs normal   Chest x-ray benign   Hypercoagulability workup including factor 5 Leiden, prothrombin gene mutation, protein C, protein S, anticardiolipin antibody, lupus anticoagulant,  All normal   48 hour Holter without arrhythmia   Echo shows EF is 76%, grade 1 diastolic dysfunction, probable patent foramen ovale with positive bubble study   Venous Doppler legs negative for DVT    Because of slight hyperreflexia in the left neurology ordered an MRI to rule out cervical stenosis which showed no significant pathology in only mild DJD   Neurology also wanted to consider potential focal sensory seizure  As there was slight tendency towards marching of symptoms- they recommended at this point we not close the PFO as she was not on any antiplatelet therapy or statin therapy at the time of the event   Also need to consider the possibility of complicated migraine but is noted patient had not had any vascular headaches in years  -at this point she will continue her aspirin therapy    denies further symptoms  5  Hyperlipidemia - felt poorly on atorvastatin -cardiology felt she could stop her statin -at this point considering low-  But patient defers -for recheck of lipid profile prior to next visit  6   Infrequent headache -did not have associated nausea  Or photophobia- watching for recurrence of vascular headaches but at this point monitoring  7  PFO -questionable clinical significance -she is aware  This can be an incidental finding -at this point Neurology does not feel we should treat this - Cardiology also feels we should not treat it  8  Left thyroid nodule measuring less than 1 5 centimeters   - ultrasound done in July shows right  Lobe nodule of 1 5 centimeters, left lobe nodule up to 0 7 centimeters and another up to 1 2 centimeters -none met criteria for biopsy    Needs repeat ultrasound in 12-18 months which would be July of 2020 1 January 2022     All other problems as per note of February 2020         MEDICAL REGIMEN:                                                    Vitamin B complex daily, vitamin-C daily, elder very, multivitamin Lotrisone cream b i d  p r n  to rash of breastA baby aspirin daily, spironolactone 50 milligrams 2 p o  B i d , multivitamin, vitamin D3 / 2000 units a day,  doxycycline 50 milligrams used intermittently for skin disease     Appointment in 6 months with prior chemistry profile cholesterol profile     Addendum- patient called the office on February the3 requesting a med for anxiety as she is very anxious about upcoming dental work -prescribed lorazepam 0 5 milligrams 1 p o  b i d  p r n  for anxiety dispense 20 with no refill     No problem-specific Assessment & Plan notes found for this encounter  Diagnoses and all orders for this visit:    Patent foramen ovale    Hyperlipidemia, unspecified hyperlipidemia type  -     Comprehensive metabolic panel; Future  -     Triglycerides; Future  -     LDL cholesterol, direct; Future  -     HDL cholesterol; Future  -     Cholesterol, total; Future    Vitamin D deficiency    Polyp of colon, unspecified part of colon, unspecified type          Subjective:      Patient ID: Sugey Sandoval is a 57 y o  female  She is following a 1200 calorie diet  She is losing weight  She is exercising  She is overall relatively stable  She has not had any further neurologic symptoms  Labs done prior to this visit reviewed in detail      Results for orders placed or performed in visit on 08/20/21  -Comprehensive metabolic panel       Result                      Value             Ref Range           Sodium                      137               136 - 145 mm*       Potassium                   5 1               3 5 - 5 3 mm*       Chloride                    108               100 - 108 mm*       CO2                         29                21 - 32 mmol*       ANION GAP                   0 (L)             4 - 13 mmol/L       BUN                         14                5 - 25 mg/dL        Creatinine                  0 81              0 60 - 1 30 *       Glucose, Fasting            83                65 - 99 mg/dL       Calcium                     9 4               8 3 - 10 1 m*       AST                         16                5 - 45 U/L          ALT                         26                12 - 78 U/L         Alkaline Phosphatase        72                46 - 116 U/L        Total Protein               7 7               6 4 - 8 2 g/*       Albumin                     3 9               3 5 - 5 0 g/*       Total Bilirubin             0 55              0 20 - 1 00 *       eGFR 78                ml/min/1 73s*  -Cholesterol, total       Result                      Value             Ref Range           Cholesterol                 199               50 - 200 mg/*  -HDL cholesterol       Result                      Value             Ref Range           HDL, Direct                 76                >=40 mg/dL     -LDL cholesterol, direct       Result                      Value             Ref Range           LDL Direct                  98                0 - 100 mg/dl  -Triglycerides       Result                      Value             Ref Range           Triglycerides               80                <=150 mg/dL    -Vitamin D 25 hydroxy       Result                      Value             Ref Range           Vit D, 25-Hydroxy           97 9              30 0 - 100 0*    She had a mammogram done May which was normal   DIAGNOSIS: Encounter for screening mammogram for malignant neoplasm of breast      TECHNIQUE:  Digital screening mammography was performed  Computer Aided Detection (CAD) analyzed all applicable images  COMPARISONS: Prior breast imaging dated: 05/12/2020, 02/25/2019, 01/31/2018, 12/30/2016, and 09/11/2015     RELEVANT HISTORY:   Family Breast Cancer History: History of breast cancer in Mother, Maternal Grandmother, Family, Maternal Aunt, [facility]  Family Medical History: Family medical history includes breast cancer in 5 relatives (cousin, family, maternal aunt, maternal grandmother, mother) and colon cancer in 3 relatives (family, father, maternal grandfather)  Personal History: Hormone history includes birth control  Surgical history includes breast biopsy and lumpectomy  Medical history includes BRCA 1 negative      The patient is scheduled in a reminder system for screening mammography      8-10% of cancers will be missed on mammography  Management of a palpable abnormality must be based on clinical grounds    Patients will be notified of their results via letter from our facility  Accredited by [employer] of [employer] and FDA      RISK ASSESSMENT:   5 Year Tyrer-Cuzick: 7 63 %  10 Year Tyrer-Cuzick: 14 33 %  Lifetime Tyrer-Cuzick: 30 89 %     TISSUE DENSITY:   The breasts are extremely dense, which lowers the sensitivity of mammography       INDICATION: Gill Tenorio is a 57 y o  female presenting for screening mammography      FINDINGS:   There are no suspicious masses, grouped microcalcifications or unexplained areas of architectural distortion  The skin and nipple areolar complex are unremarkable  Left breast postsurgical scarring and distortion are unchanged      IMPRESSION:  No mammographic evidence of malignancy      This patient has been identified as being at elevated risk for development of breast cancer based on the Tyrer-Cuzick model  She may benefit from supplemental screening      ASSESSMENT/BI-RADS CATEGORY:  Left: 2 - Benign  Right: 1 - Negative  Overall: 2 - Benign     RECOMMENDATION:       - Routine screening mammogram in 1 year for both breasts      Workstation ID: [id number]        notes reviewed and she will be due for her next colonoscopy in summer of 2022    She has occasional headaches infrequent  She has not had any associated photophobia or nausea  He denies any new weakness of 1 arm and leg compared to the other  She denies any new numbness of 1 arm and leg compared to the other  Medical regimen reviewed in detail  Her vitamin-D is upper limits of normal and therefore she will discontinue her monthly vitamin-D supplementation  Previously noted fatigue is not been a major issue  Workup with a past was negative including thyroid function cortisol level on Lyme titer              The following portions of the patient's history were reviewed and updated as appropriate: allergies, current medications, past family history, past medical history, past social history, past surgical history and problem list     Review of Systems   Constitutional: Negative  HENT: Negative  Respiratory: Negative  Cardiovascular: Negative  Gastrointestinal: Negative  Endocrine: Negative  Genitourinary: Negative  Musculoskeletal: Negative  Skin: Negative  Neurological: Negative  Hematological: Negative  Psychiatric/Behavioral: Negative  Objective:      Ht 5' 4" (1 626 m)   Wt 64 1 kg (141 lb 6 4 oz)   BMI 24 27 kg/m²          Physical Exam  Vitals reviewed  Constitutional:       General: She is not in acute distress  Appearance: Normal appearance  She is not ill-appearing, toxic-appearing or diaphoretic  HENT:      Head: Normocephalic and atraumatic  Right Ear: Tympanic membrane, ear canal and external ear normal       Left Ear: Tympanic membrane, ear canal and external ear normal       Nose: Nose normal  No congestion or rhinorrhea  Mouth/Throat:      Mouth: Mucous membranes are moist       Pharynx: Oropharynx is clear  No oropharyngeal exudate or posterior oropharyngeal erythema  Eyes:      General: No scleral icterus  Right eye: No discharge  Left eye: No discharge  Extraocular Movements: Extraocular movements intact  Pupils: Pupils are equal, round, and reactive to light  Neck:      Vascular: No carotid bruit  Cardiovascular:      Rate and Rhythm: Normal rate and regular rhythm  Pulses: Normal pulses  Heart sounds: Normal heart sounds  No murmur heard  No friction rub  No gallop  Pulmonary:      Effort: Pulmonary effort is normal  No respiratory distress  Breath sounds: Normal breath sounds  No stridor  No wheezing, rhonchi or rales  Chest:      Chest wall: No tenderness  Abdominal:      General: Abdomen is flat  Bowel sounds are normal  There is no distension  Palpations: Abdomen is soft  There is no mass  Tenderness: There is no abdominal tenderness   There is no right CVA tenderness, left CVA tenderness, guarding or rebound  Hernia: No hernia is present  Musculoskeletal:         General: No swelling, tenderness, deformity or signs of injury  Normal range of motion  Cervical back: Normal range of motion and neck supple  No rigidity  No muscular tenderness  Right lower leg: No edema  Left lower leg: No edema  Lymphadenopathy:      Cervical: No cervical adenopathy  Skin:     General: Skin is warm and dry  Coloration: Skin is not jaundiced or pale  Findings: No bruising, erythema, lesion or rash  Neurological:      General: No focal deficit present  Mental Status: She is alert and oriented to person, place, and time  Mental status is at baseline  Cranial Nerves: No cranial nerve deficit  Sensory: No sensory deficit  Motor: No weakness  Coordination: Coordination normal       Gait: Gait normal       Deep Tendon Reflexes: Reflexes normal    Psychiatric:         Mood and Affect: Mood normal          Behavior: Behavior normal          Thought Content:  Thought content normal          Judgment: Judgment normal

## 2021-10-02 ENCOUNTER — IMMUNIZATIONS (OUTPATIENT)
Dept: FAMILY MEDICINE CLINIC | Facility: HOSPITAL | Age: 61
End: 2021-10-02

## 2021-10-02 DIAGNOSIS — Z23 ENCOUNTER FOR IMMUNIZATION: Primary | ICD-10-CM

## 2021-10-02 PROCEDURE — 91300 SARS-COV-2 / COVID-19 MRNA VACCINE (PFIZER-BIONTECH) 30 MCG: CPT

## 2021-10-02 PROCEDURE — 0001A SARS-COV-2 / COVID-19 MRNA VACCINE (PFIZER-BIONTECH) 30 MCG: CPT

## 2021-12-02 ENCOUNTER — OFFICE VISIT (OUTPATIENT)
Dept: URGENT CARE | Age: 61
End: 2021-12-02
Payer: COMMERCIAL

## 2021-12-02 ENCOUNTER — APPOINTMENT (OUTPATIENT)
Dept: RADIOLOGY | Age: 61
End: 2021-12-02
Attending: PHYSICIAN ASSISTANT
Payer: COMMERCIAL

## 2021-12-02 ENCOUNTER — TELEPHONE (OUTPATIENT)
Dept: INTERNAL MEDICINE CLINIC | Facility: CLINIC | Age: 61
End: 2021-12-02

## 2021-12-02 VITALS
WEIGHT: 141 LBS | OXYGEN SATURATION: 94 % | HEIGHT: 64 IN | SYSTOLIC BLOOD PRESSURE: 112 MMHG | RESPIRATION RATE: 16 BRPM | DIASTOLIC BLOOD PRESSURE: 64 MMHG | HEART RATE: 76 BPM | TEMPERATURE: 97 F | BODY MASS INDEX: 24.07 KG/M2

## 2021-12-02 DIAGNOSIS — M77.52 TENDINITIS OF LEFT FOOT: Primary | ICD-10-CM

## 2021-12-02 DIAGNOSIS — M79.672 LEFT FOOT PAIN: ICD-10-CM

## 2021-12-02 PROCEDURE — 73630 X-RAY EXAM OF FOOT: CPT

## 2021-12-02 PROCEDURE — G0382 LEV 3 HOSP TYPE B ED VISIT: HCPCS | Performed by: PHYSICIAN ASSISTANT

## 2022-01-09 ENCOUNTER — TELEPHONE (OUTPATIENT)
Dept: INTERNAL MEDICINE CLINIC | Facility: CLINIC | Age: 62
End: 2022-01-09

## 2022-01-09 DIAGNOSIS — J01.80 OTHER ACUTE SINUSITIS, RECURRENCE NOT SPECIFIED: Primary | ICD-10-CM

## 2022-01-09 NOTE — TELEPHONE ENCOUNTER
This patient's  had recent clinical COVID  She has had 3 COVID vaccines  She has some sinus discomfort-this is been an issue in the past but she is worried about COVID as the cause her current symptoms  She has not had fever shortness of breath significant cough etcetera    COVID testing ordered and came back negative

## 2022-01-10 DIAGNOSIS — R51.9 NONINTRACTABLE HEADACHE, UNSPECIFIED CHRONICITY PATTERN, UNSPECIFIED HEADACHE TYPE: Primary | ICD-10-CM

## 2022-01-10 PROCEDURE — U0005 INFEC AGEN DETEC AMPLI PROBE: HCPCS | Performed by: INTERNAL MEDICINE

## 2022-01-10 PROCEDURE — U0003 INFECTIOUS AGENT DETECTION BY NUCLEIC ACID (DNA OR RNA); SEVERE ACUTE RESPIRATORY SYNDROME CORONAVIRUS 2 (SARS-COV-2) (CORONAVIRUS DISEASE [COVID-19]), AMPLIFIED PROBE TECHNIQUE, MAKING USE OF HIGH THROUGHPUT TECHNOLOGIES AS DESCRIBED BY CMS-2020-01-R: HCPCS | Performed by: INTERNAL MEDICINE

## 2022-01-11 ENCOUNTER — TELEPHONE (OUTPATIENT)
Dept: INTERNAL MEDICINE CLINIC | Facility: CLINIC | Age: 62
End: 2022-01-11

## 2022-01-11 NOTE — TELEPHONE ENCOUNTER
----- Message from Army Beryl MD sent at 1/11/2022 12:21 PM EST -----  Please call the patient regarding her covid test is hormal

## 2022-01-14 ENCOUNTER — PREPPED CHART (OUTPATIENT)
Dept: URBAN - METROPOLITAN AREA CLINIC 6 | Facility: CLINIC | Age: 62
End: 2022-01-14

## 2022-01-14 DIAGNOSIS — H35.372: ICD-10-CM

## 2022-01-14 DIAGNOSIS — H33.312: ICD-10-CM

## 2022-01-14 DIAGNOSIS — H25.813: ICD-10-CM

## 2022-01-14 PROCEDURE — 92014 COMPRE OPH EXAM EST PT 1/>: CPT

## 2022-01-14 ASSESSMENT — TONOMETRY
OD_IOP_MMHG: 21
OS_IOP_MMHG: 23

## 2022-01-14 ASSESSMENT — VISUAL ACUITY
OD_SC: 20/25+2
OS_SC: 20/40
OU_SC: J2

## 2022-02-03 ENCOUNTER — TELEPHONE (OUTPATIENT)
Dept: INTERNAL MEDICINE CLINIC | Facility: CLINIC | Age: 62
End: 2022-02-03

## 2022-02-03 DIAGNOSIS — F41.9 ANXIETY: Primary | ICD-10-CM

## 2022-02-03 NOTE — TELEPHONE ENCOUNTER
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

## 2022-02-03 NOTE — TELEPHONE ENCOUNTER
She can use lorazepam 0 5 milligrams 1 p o  b I d  p r n  for anxiety dispense 20 with no refill with diagnosis of anxiety

## 2022-02-04 RX ORDER — LORAZEPAM 0.5 MG/1
0.5 TABLET ORAL EVERY 8 HOURS PRN
Qty: 20 TABLET | Refills: 0 | Status: SHIPPED | OUTPATIENT
Start: 2022-02-04

## 2022-02-19 ENCOUNTER — APPOINTMENT (OUTPATIENT)
Dept: LAB | Facility: MEDICAL CENTER | Age: 62
End: 2022-02-19
Payer: COMMERCIAL

## 2022-02-19 DIAGNOSIS — E78.5 HYPERLIPIDEMIA, UNSPECIFIED HYPERLIPIDEMIA TYPE: Chronic | ICD-10-CM

## 2022-02-19 LAB
ALBUMIN SERPL BCP-MCNC: 4 G/DL (ref 3.5–5)
ALP SERPL-CCNC: 76 U/L (ref 46–116)
ALT SERPL W P-5'-P-CCNC: 24 U/L (ref 12–78)
ANION GAP SERPL CALCULATED.3IONS-SCNC: 4 MMOL/L (ref 4–13)
AST SERPL W P-5'-P-CCNC: 14 U/L (ref 5–45)
BILIRUB SERPL-MCNC: 0.57 MG/DL (ref 0.2–1)
BUN SERPL-MCNC: 17 MG/DL (ref 5–25)
CALCIUM SERPL-MCNC: 9.5 MG/DL (ref 8.3–10.1)
CHLORIDE SERPL-SCNC: 108 MMOL/L (ref 100–108)
CHOLEST SERPL-MCNC: 184 MG/DL
CO2 SERPL-SCNC: 27 MMOL/L (ref 21–32)
CREAT SERPL-MCNC: 0.81 MG/DL (ref 0.6–1.3)
GFR SERPL CREATININE-BSD FRML MDRD: 78 ML/MIN/1.73SQ M
GLUCOSE P FAST SERPL-MCNC: 116 MG/DL (ref 65–99)
HDLC SERPL-MCNC: 66 MG/DL
LDLC SERPL DIRECT ASSAY-MCNC: 88 MG/DL (ref 0–100)
POTASSIUM SERPL-SCNC: 4.4 MMOL/L (ref 3.5–5.3)
PROT SERPL-MCNC: 8 G/DL (ref 6.4–8.2)
SODIUM SERPL-SCNC: 139 MMOL/L (ref 136–145)
TRIGL SERPL-MCNC: 74 MG/DL

## 2022-02-19 PROCEDURE — 80053 COMPREHEN METABOLIC PANEL: CPT

## 2022-02-19 PROCEDURE — 36415 COLL VENOUS BLD VENIPUNCTURE: CPT

## 2022-02-19 PROCEDURE — 80061 LIPID PANEL: CPT

## 2022-02-19 PROCEDURE — 83721 ASSAY OF BLOOD LIPOPROTEIN: CPT

## 2022-02-28 ENCOUNTER — OFFICE VISIT (OUTPATIENT)
Dept: INTERNAL MEDICINE CLINIC | Facility: CLINIC | Age: 62
End: 2022-02-28
Payer: COMMERCIAL

## 2022-02-28 ENCOUNTER — ANNUAL EXAM (OUTPATIENT)
Dept: OBGYN CLINIC | Facility: CLINIC | Age: 62
End: 2022-02-28
Payer: COMMERCIAL

## 2022-02-28 VITALS
BODY MASS INDEX: 24.38 KG/M2 | DIASTOLIC BLOOD PRESSURE: 72 MMHG | HEART RATE: 72 BPM | SYSTOLIC BLOOD PRESSURE: 102 MMHG | WEIGHT: 142.8 LBS | RESPIRATION RATE: 12 BRPM | HEIGHT: 64 IN

## 2022-02-28 VITALS
WEIGHT: 144.4 LBS | HEIGHT: 64 IN | DIASTOLIC BLOOD PRESSURE: 68 MMHG | BODY MASS INDEX: 24.65 KG/M2 | SYSTOLIC BLOOD PRESSURE: 112 MMHG

## 2022-02-28 DIAGNOSIS — E55.9 VITAMIN D DEFICIENCY: ICD-10-CM

## 2022-02-28 DIAGNOSIS — N95.2 ATROPHIC VAGINITIS: ICD-10-CM

## 2022-02-28 DIAGNOSIS — E04.1 THYROID NODULE: ICD-10-CM

## 2022-02-28 DIAGNOSIS — E78.5 HYPERLIPIDEMIA, UNSPECIFIED HYPERLIPIDEMIA TYPE: ICD-10-CM

## 2022-02-28 DIAGNOSIS — Z01.419 ENCNTR FOR GYN EXAM (GENERAL) (ROUTINE) W/O ABN FINDINGS: Primary | ICD-10-CM

## 2022-02-28 DIAGNOSIS — Z12.31 ENCOUNTER FOR SCREENING MAMMOGRAM FOR MALIGNANT NEOPLASM OF BREAST: ICD-10-CM

## 2022-02-28 DIAGNOSIS — Z01.419 PAP SMEAR, AS PART OF ROUTINE GYNECOLOGICAL EXAMINATION: ICD-10-CM

## 2022-02-28 DIAGNOSIS — R73.9 HYPERGLYCEMIA: Primary | ICD-10-CM

## 2022-02-28 LAB — SL AMB POCT HEMOGLOBIN AIC: 5.3 (ref ?–6.5)

## 2022-02-28 PROCEDURE — 83036 HEMOGLOBIN GLYCOSYLATED A1C: CPT | Performed by: INTERNAL MEDICINE

## 2022-02-28 PROCEDURE — S0612 ANNUAL GYNECOLOGICAL EXAMINA: HCPCS | Performed by: OBSTETRICS & GYNECOLOGY

## 2022-02-28 PROCEDURE — 99214 OFFICE O/P EST MOD 30 MIN: CPT | Performed by: INTERNAL MEDICINE

## 2022-02-28 PROCEDURE — G0145 SCR C/V CYTO,THINLAYER,RESCR: HCPCS | Performed by: OBSTETRICS & GYNECOLOGY

## 2022-02-28 NOTE — PROGRESS NOTES
Assessment/Plan:  1  Health maintenance-patient did receive her booster dose of COVID vaccine  2  Mild atherosclerosis of the aortic arch   I felt because of this she should start a low-dose statin   Cardiology was in agreement a initially but then later told the patient she did not need a statin   Neurology felt she did not need a statin for secondary stroke prevention   Recommended we consider CT coronary calcium score for additional information and if this is abnormal with then have additional data suggesting she should be on a statin -she elected not to proceed with this and at this point is holding off on statin use  3  Intermittent fatigue -no obvious etiology   Workup negative including chemistry profile, CBC, thyroid function, B12 level, Lyme titer and cortisol level   This point monitoring  4 hyperglycemia under labs-patient had a large amount emotional upset at the time of the blood draw which may be influenced this-A1c done in the office today normal   For repeat fasting blood sugar and A1c prior to next visit   5 episode of numbness of the left arm with difficulty with speech lasting several minutes   Was admitted with all labs normal, CT angiogram of the head shows only minimal atherosclerosis of the aorta but no significant occlusive disease   MRI the brain normal   All labs normal   Chest x-ray benign   Hypercoagulability workup including factor 5 Leiden, prothrombin gene mutation, protein C, protein S, anticardiolipin antibody, lupus anticoagulant,  All normal   48 hour Holter without arrhythmia   Echo shows EF is 94%, grade 1 diastolic dysfunction, probable patent foramen ovale with positive bubble study   Venous Doppler legs negative for DVT    Because of slight hyperreflexia in the left neurology ordered an MRI to rule out cervical stenosis which showed no significant pathology in only mild DJD   Neurology also wanted to consider potential focal sensory seizure  As there was slight tendency towards marching of symptoms- they recommended at this point we not close the PFO as she was not on any antiplatelet therapy or statin therapy at the time of the event   Also need to consider the possibility of complicated migraine but is noted patient had not had any vascular headaches in years  -at this point she will continue her aspirin therapy    denies further symptoms  6  Hyperlipidemia - felt poorly on atorvastatin -cardiology felt she could stop her statin -at this point considering low-  But patient defers -for recheck of lipid profile prior to next visit  7   Infrequent headache does not have associated nausea  Or photophobia- watching for recurrence of vascular headaches but at this point monitoring  8  PFO -questionable clinical significance -she is aware  This can be an incidental finding -at this point Neurology does not feel we should treat this - Cardiology also feels we should not treat it  9  Left thyroid nodule measuring less than 1 5 centimeters   - ultrasound done in July shows right  Lobe nodule of 1 5 centimeters, left lobe nodule up to 0 7 centimeters and another up to 1 2 centimeters -none met criteria for biopsy   Scheduled for repeat thyroid ultrasound today 10  Health maintenance-previously DEXA scan ordered but patient is yet to proceed   11  Previously noted incontinence-I recommended she consider gyn urologist   Cystoscopy in the past showed inflammatory bladder neck polyps with squamous metaplasia and she was evaluated by Dr Chante Epstein  In the remote past she had been on Macrodantin suppression  12  Rosacea-treatments per Dermatology  She intermittently uses suppressive doxycycline during flare-ups  She remains on spironolactone  Prior antigen levels all normal   13  Family history cancer of the colon apparent-patient also has a personal history of polyps    Last colonoscopy was done in July 2017 showing polyps-repeat recommended in 5 years which is summer of [facility] 112 SCHEDULED NEXT VISIT  14  Vitamin-D deficiency-continue supplement  15  Anxiety-mild-not a major issue  16  Dizziness-had seen ENT in the past   1 point was considered for possible Meniere's disease-not an issue times months  17  Strong family history of breast cancer  Mother had premenopausal breast C A  Grandmother had premenopausal breast CA  No family history of ovarian C A  Also with family history of cancer of the colon as described above  She had genetic testing was told results were negative  We talked in the past about intermittent MRI the breast and evaluation by breast specialist-REVIEW NEXT VISIT           MEDICAL REGIMEN:                                                    Vitamin B complex daily, vitamin-C daily, elderberry, multivitamin Lotrisone cream b i d  p r n  to rash of breastA baby aspirin daily, spironolactone 50 milligrams 2 p o  B i d , multivitamin, vitamin D3 / 2000 units a day,  doxycycline 50 milligrams used intermittently for skin disease     Scheduled for thyroid ultrasound  Appointment in 6 months with prior chemistry profile cholesterol profile A1c vitamin-D level    We reviewed I am retiring- she will now be seen by Dr Michaud as her PCP    Addendum-thyroid ultrasound done in March 2022 read is not significantly changed but Radiology now feels right lower pole nodule does meet criteria potentially for biopsy  Patient denies any history of head and neck radiation or any family history of thyroid cancer-recommended she be evaluated by Dr Barlow potential additional investigation of the nodule-    Addendum-patient seen by [place] who feels nodules relatively stable  He recommended an ultrasound in 1 year with follow-up visit  He felt if it continues to large they would then proceed with ultrasound-guided needle aspiration biopsy      No problem-specific Assessment & Plan notes found for this encounter         Diagnoses and all orders for this visit:    Hyperglycemia  -     POCT hemoglobin A1c  -     Comprehensive metabolic panel; Future  -     Cholesterol, total; Future  -     HDL cholesterol; Future  -     LDL cholesterol, direct; Future  -     Triglycerides; Future  -     Hemoglobin A1C; Future  -     Vitamin D 25 hydroxy; Future    Hyperlipidemia, unspecified hyperlipidemia type  -     Comprehensive metabolic panel; Future  -     Cholesterol, total; Future  -     HDL cholesterol; Future  -     LDL cholesterol, direct; Future  -     Triglycerides; Future  -     Hemoglobin A1C; Future  -     Vitamin D 25 hydroxy; Future    Vitamin D deficiency  -     Comprehensive metabolic panel; Future  -     Cholesterol, total; Future  -     HDL cholesterol; Future  -     LDL cholesterol, direct; Future  -     Triglycerides; Future  -     Hemoglobin A1C; Future  -     Vitamin D 25 hydroxy; Future    Thyroid nodule  -     US thyroid; Future          Subjective:      Patient ID: Jeanie Hernández is a 60 y o  female  She is overall relatively stable  On recent labs showed hyperglycemia with a sugar 116 but she had had a very upsetting emotional event shortly before blood work was drawn  A1c was done in the office today and was normal     She may have a once per month headache without nausea or photophobia  She has not had any focal neurologic symptoms or signs  This patient denies any systemic symptoms  Specifically there has been no evidence of fever, night sweats, significant weight loss or significant decrease in appetite  Recent labs reviewed in detail  Results for orders placed or performed in visit on 02/28/22  -POCT hemoglobin A1c:        Result                      Value             Ref Range           Hemoglobin A1C              5 3               6 5              Labs done be labs show otherwise normal chemistry profile other than sugar 116, LDL 88, HDL 66, cholesterol 184, triglycerides of 74  Creatinine normal at 0 81      She has a history of thyroid nodules due for thyroid ultrasound  This will be scheduled  There is no definite palpable nodule on exam   Prior thyroid function had been normal    She will be due for follow-up colonoscopy 5 years after her last by the summer of this year and she is aware this  She had repair dose of COVID vaccine  She receives annual influenza  She is having some dental work and is very anxious about this  She had called office requested antianxiety med was given prescription for low-dose lorazepam     His recent thyroid ultrasound reviewed in detail  THYROID ULTRASOUND     INDICATION:    E04 1: Nontoxic single thyroid nodule      COMPARISON:  Compared with CTA neck of 8/30/2019     TECHNIQUE:   Ultrasound of the thyroid was performed with a high frequency linear transducer in transverse and sagittal planes including volumetric imaging sweeps as well as traditional still imaging technique      FINDINGS:  Thyroid parenchyma is diffusely heterogeneous in echotexture with focal nodule(s) as described below      Right lobe:  4 6 x 1 6 x 1 6 cm  Volume of 5 4 mL  Left lobe:  4 5 x 1 4 x 1 5 cm  Volume of 4 5 mL  Isthmus:  0 2 cm      Nodule #1  RIGHT lower pole nodule measuring 1 8 x 0 9 x 1 5 cm  COMPOSITION:  1 point, mixed cystic and solid  ECHOGENICITY:  1 point, hyperechoic or isoechoic  SHAPE:  0 points, wider-than-tall  MARGIN: 0 points, ill-defined  ECHOGENIC FOCI:  0 points, none or large comet-tail artifacts  TI-RADS Classification: TR 2 (2 points), Not suspicious  No FNA      Nodule #2  LEFT midgland nodule measuring 0 7 x 0 4 x 0 5 cm  COMPOSITION:  1 point, mixed cystic and solid  ECHOGENICITY:  1 point, echogenicity cannot be determined  SHAPE:  0 points, wider-than-tall  MARGIN: 0 points, cannot be determined  ECHOGENIC FOCI:  0 points, none or large comet-tail artifacts  TI-RADS Classification: TR 2 (2 points), Not suspicious  No FNA      Nodule #3       LEFT lower pole nodule measuring 1 0 x 0 7 x 1 2 cm   COMPOSITION:  0 points, spongiform  ECHOGENICITY:  1 point, hyperechoic or isoechoic  SHAPE:  0 points, wider-than-tall  MARGIN: 0 points, cannot be determined  ECHOGENIC FOCI:  0 points, none or large comet-tail artifacts  TI-RADS Classification: TR 2 (2 points), Not suspicious  No FNA      IMPRESSION:     No nodule meets current ACR criteria for requiring biopsy or followup ultrasounds       Findings similar to CT neck study of 8/30/2019 although not exactly comparative due to  different modalities      Reference: ACR Thyroid Imaging, Reporting and Data System (TI-RADS): White Paper of the [employer]  J AM Bryanna Radiol 5613;[id number]  (additional recommendations based on American Thyroid Association 2015 guidelines )        Workstation performed: [id number]   A  has told the patient that she snores  She says she never wake cancer cell from sleep with gasping although she has a very light sleeper  They will continue to monitor at home  She has not had any previously noted numbness of the left arm with difficulty with speech  Denies any other cardiopulmonary symptoms at present  Medical regimen reviewed  She remains on current dose of spironolactone in sees Dermatology every 6 -12 months  The following portions of the patient's history were reviewed and updated as appropriate: allergies, current medications, past family history, past medical history, past social history, past surgical history and problem list     Review of Systems   Constitutional: Negative  HENT: Negative  Respiratory: Negative  Cardiovascular: Negative  Gastrointestinal: Negative  Endocrine: Negative  Genitourinary: Negative  Musculoskeletal: Negative  Skin: Negative  Neurological: Positive for headaches  Hematological: Negative  Psychiatric/Behavioral: Negative            Objective:      Ht 5' 4" (1 626 m)   Wt 64 8 kg (142 lb 12 8 oz)   BMI 24 51 kg/m² Physical Exam  Vitals reviewed  Constitutional:       General: She is not in acute distress  Appearance: Normal appearance  She is not ill-appearing, toxic-appearing or diaphoretic  HENT:      Head: Normocephalic and atraumatic  Right Ear: External ear normal       Left Ear: External ear normal       Nose: Nose normal  No congestion or rhinorrhea  Mouth/Throat:      Mouth: Mucous membranes are moist       Pharynx: Oropharynx is clear  No oropharyngeal exudate or posterior oropharyngeal erythema  Eyes:      General: No scleral icterus  Right eye: No discharge  Left eye: No discharge  Extraocular Movements: Extraocular movements intact  Conjunctiva/sclera: Conjunctivae normal    Neck:      Vascular: No carotid bruit  Comments: No dominant thyroid nodule palpated  Cardiovascular:      Rate and Rhythm: Normal rate and regular rhythm  Pulses: Normal pulses  Heart sounds: Normal heart sounds  No murmur heard  No friction rub  No gallop  Pulmonary:      Effort: Pulmonary effort is normal  No respiratory distress  Breath sounds: Normal breath sounds  No stridor  No wheezing, rhonchi or rales  Chest:      Chest wall: No tenderness  Abdominal:      General: Abdomen is flat  Bowel sounds are normal  There is no distension  Palpations: Abdomen is soft  There is no mass  Tenderness: There is no abdominal tenderness  There is no right CVA tenderness, left CVA tenderness, guarding or rebound  Hernia: No hernia is present  Musculoskeletal:         General: No swelling, tenderness, deformity or signs of injury  Normal range of motion  Cervical back: Normal range of motion and neck supple  No rigidity  No muscular tenderness  Right lower leg: No edema  Left lower leg: No edema  Lymphadenopathy:      Cervical: No cervical adenopathy  Skin:     General: Skin is warm and dry  Coloration: Skin is not jaundiced or pale  Findings: No bruising, erythema, lesion or rash  Comments: Peripheral venous disease   Neurological:      General: No focal deficit present  Mental Status: She is alert and oriented to person, place, and time  Mental status is at baseline  Cranial Nerves: No cranial nerve deficit  Sensory: No sensory deficit  Motor: No weakness  Coordination: Coordination normal       Gait: Gait normal       Deep Tendon Reflexes: Reflexes normal    Psychiatric:         Mood and Affect: Mood normal          Behavior: Behavior normal          Thought Content:  Thought content normal          Judgment: Judgment normal

## 2022-02-28 NOTE — PROGRESS NOTES
Assessment/Plan:    No problem-specific Assessment & Plan notes found for this encounter  Diagnoses and all orders for this visit:    Encntr for gyn exam (general) (routine) w/o abn findings  -     Liquid-based pap, screening    Pap smear, as part of routine gynecological examination    Encounter for screening mammogram for malignant neoplasm of breast  -     Mammo screening bilateral w 3d & cad; Future    Atrophic vaginitis          Normal gynecological physical examination  Self-breast examination stressed  Mammogram ordered  Discussed regular exercise, healthy diet, importance of vitamin D and calcium supplements  Discussed importance of sun block use during periods of prolonged sun exposure  Patient will be seen in 1 year for routine gynecologic and medical examination  Patient will call office for any problems, concerns, or issues which may arise during the interim  Subjective:          HPI    Patient ID: Mike Esposito is a 60 y o  female who presents today for her annual gynecologic and medical examination  Patient offers no complaints at this time  She has a history of atopic dermatitis which can at times this affects her breasts  Shares it can cause nipple itching but denies bleeding or discharge of the breasts or nipples  Patient shares she takes vitamin-D supplementation but denies taking calcium  She is active daily in the summer and does outdoor walks but due to the cold weather has not been as active  Denies vaginal bleeding, discharge, itching  Offers no complaints with bowels or bladder, and denies blood in the stool  Denies chest pain, heart palpitations, shortness of breath, fever, chills, falls at home or back pain  Share she does not sleep well but relates it to her anxiety and mind racing  Menstrual status:  Postmenopausal   Denies hot flashes or mood swings  Vasomotor symptoms:  Denies hot flashes or facial flushing      Patient reports normal appetite    Patient reports normal bowel and bladder habits    Patient denies any significant pelvic or abdominal pain    Patient denies any headaches, chest pain, shortness of breath fever shakes or chills    Patient denies any COVID 19 symptoms including cough or loss of taste or smell    COVID vaccine status:  Fully vaccinated + booster    Medical problems: Followed for cholesterol and vascular headaches  Colonoscopy status:  Last colonoscopy in 2017  Follow recommendations given  Mammogram status:  Last mammogram in May of 2021  Ordered for this year  The following portions of the patient's history were reviewed and updated as appropriate: allergies, current medications, past family history, past medical history, past social history, past surgical history and problem list     Review of Systems   Constitutional: Negative  Negative for appetite change, diaphoresis, fatigue, fever and unexpected weight change  HENT: Negative  Eyes: Negative  Respiratory: Negative  Cardiovascular: Negative  Gastrointestinal: Negative  Negative for abdominal pain, blood in stool, constipation, diarrhea, nausea and vomiting  Endocrine: Negative  Negative for cold intolerance and heat intolerance  Genitourinary: Negative  Negative for dysuria, frequency, hematuria, urgency, vaginal bleeding, vaginal discharge and vaginal pain  Musculoskeletal: Negative  Skin: Negative  Allergic/Immunologic: Negative  Neurological: Positive for headaches  Patient is followed for vascular headaches  Denies dizziness, lightheadedness, or falls at home  Hematological: Negative  Negative for adenopathy  Psychiatric/Behavioral: Negative  Objective:      /68   Ht 5' 4" (1 626 m)   Wt 65 5 kg (144 lb 6 4 oz)   BMI 24 79 kg/m²          Physical Exam  Constitutional:       General: She is not in acute distress  Appearance: Normal appearance  She is well-developed   She is not diaphoretic  HENT:      Head: Normocephalic and atraumatic  Eyes:      Pupils: Pupils are equal, round, and reactive to light  Cardiovascular:      Rate and Rhythm: Normal rate and regular rhythm  Heart sounds: Normal heart sounds  No murmur heard  No friction rub  No gallop  Pulmonary:      Effort: Pulmonary effort is normal       Breath sounds: Normal breath sounds  Chest:   Breasts: Breasts are symmetrical       Right: Normal  No swelling, bleeding, inverted nipple, mass, nipple discharge, skin change, tenderness, axillary adenopathy or supraclavicular adenopathy  Left: Normal  No swelling, bleeding, inverted nipple, mass, nipple discharge, skin change, tenderness, axillary adenopathy or supraclavicular adenopathy  Comments: Dense breasts noted bilaterally  No asymmetries, inversions, or retractions of the bilateral breasts noted  Abdominal:      General: Bowel sounds are normal       Palpations: Abdomen is soft  Genitourinary:     Exam position: Supine  Labia:         Right: No rash or lesion  Left: No rash or lesion  Vagina: Normal  No vaginal discharge, erythema, tenderness or bleeding  Cervix: No discharge or friability  Uterus: Not enlarged and not tender  Adnexa:         Right: No mass, tenderness or fullness  Left: No mass, tenderness or fullness  Rectum: Normal  Guaiac result negative  Comments: Pelvic exam revealed minimal atrophic vaginitis   Good pelvic support confirmed  Musculoskeletal:         General: Normal range of motion  Cervical back: Normal range of motion and neck supple  Lymphadenopathy:      Cervical: No cervical adenopathy  Upper Body:      Right upper body: No supraclavicular, axillary or pectoral adenopathy  Left upper body: No supraclavicular, axillary or pectoral adenopathy  Skin:     General: Skin is warm and dry  Findings: No rash     Neurological:      Mental Status: She is alert and oriented to person, place, and time  Psychiatric:         Speech: Speech normal          Behavior: Behavior normal          Thought Content:  Thought content normal          Judgment: Judgment normal

## 2022-03-03 ENCOUNTER — HOSPITAL ENCOUNTER (OUTPATIENT)
Dept: RADIOLOGY | Age: 62
Discharge: HOME | End: 2022-03-03
Payer: COMMERCIAL

## 2022-03-03 DIAGNOSIS — E04.1 THYROID NODULE: ICD-10-CM

## 2022-03-03 PROCEDURE — 76536 US EXAM OF HEAD AND NECK: CPT

## 2022-03-05 LAB
LAB AP GYN PRIMARY INTERPRETATION: NORMAL
Lab: NORMAL

## 2022-03-07 ENCOUNTER — TELEPHONE (OUTPATIENT)
Dept: INTERNAL MEDICINE CLINIC | Facility: CLINIC | Age: 62
End: 2022-03-07

## 2022-03-07 DIAGNOSIS — E04.1 THYROID NODULE: ICD-10-CM

## 2022-03-07 DIAGNOSIS — R91.1 PULMONARY NODULE: Primary | ICD-10-CM

## 2022-03-07 NOTE — TELEPHONE ENCOUNTER
----- Message from Paige Daniel MD sent at 3/6/2022  8:39 AM EST -----  I spoke with this patient on Sunday March 6th-she has abnormal thyroid ultrasound with a right lobe nodule-please set her up to be evaluated by Dr Kolby Oneill with diagnosis of right lobe thyroid nodule

## 2022-03-07 NOTE — TELEPHONE ENCOUNTER
----- Message from Anita Carson MD sent at 3/6/2022  8:39 AM EST -----  I spoke with this patient on Sunday March 6th-she has abnormal thyroid ultrasound with a right lobe nodule-please set her up to be evaluated by Dr Kolby Serrato with diagnosis of right lobe thyroid nodule

## 2022-03-07 NOTE — TELEPHONE ENCOUNTER
REFERRAL ENTERED AND PT IS AWARE    SHE'S GOING TO CALL TO SCHEDULE HER APPT BUT KNOWS TO CALL ME BACK IF SHE HAS ANY TROUBLE GETTING SEEN

## 2022-03-22 ENCOUNTER — CONSULT (OUTPATIENT)
Dept: SURGERY | Facility: CLINIC | Age: 62
End: 2022-03-22
Payer: COMMERCIAL

## 2022-03-22 VITALS
HEIGHT: 64 IN | HEART RATE: 72 BPM | SYSTOLIC BLOOD PRESSURE: 107 MMHG | DIASTOLIC BLOOD PRESSURE: 66 MMHG | WEIGHT: 141 LBS | BODY MASS INDEX: 24.07 KG/M2

## 2022-03-22 DIAGNOSIS — E04.1 THYROID NODULE: ICD-10-CM

## 2022-03-22 PROCEDURE — 99203 OFFICE O/P NEW LOW 30 MIN: CPT | Performed by: SURGERY

## 2022-03-22 NOTE — PROGRESS NOTES
Assessment/Plan:  Patient presents with an abnormal thyroid ultrasound  She has had 3 nodules followed over the last 3 years  The largest is in the right lower pole measuring 1 9 x 1 5 cm  Does not appear suspicious  Biopsy was not recommended last year, but present guidelines would suggest needle biopsy  Her review of systems is unremarkable  She denies any change in her voice  She denies any difficulty swallowing  She denies discomfort over the thyroid gland  As this nodule is relatively stable, I recommended an ultrasound in 1 year with a follow-up visit  If it continues to enlarge, ultrasound-guided needle aspiration biopsy would be recommended  Patient is agreeable  A thyroid function panel was also requested  Diagnoses and all orders for this visit:    Thyroid nodule  -     Ambulatory Referral to General Surgery  -     US thyroid; Future  -     TSH + Free T4; Future        Subjective:      Patient ID: Any Lagos is a 60 y o  female  Patient presents for thyroid consult  Ultrasound thyroid 3/3/2022   IMPRESSION:   Overall, no significant interval change in thyroid nodules  However, right lower pole nodule (#1) does actually meet current ACR criteria for recommending biopsy  It has shown about 18 months size stability without suspicious interval change in the   interim  Biopsy versus follow-up ultrasound in 1 year may both be reasonable options depending on patient history, risk factors and patient's comfort level with watchful waiting if biopsy is deferred  Thyroid Problem  Presents for initial visit  Symptoms include cold intolerance and fatigue  Patient reports no anxiety         The following portions of the patient's history were reviewed and updated as appropriate:     She  has a past medical history of Abnormal cervical Papanicolaou smear, Acne, Asthma, BRCA1 negative, Change in bowel habit, Depression, Family hx of colon cancer requiring screening colonoscopy, Long term use of drug, Migraine, PONV (postoperative nausea and vomiting), Retinal tear, Thyroid nodule (2020), TIA (transient ischemic attack), 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, and Vascular headache  She  has a past surgical history that includes Retinopathy surgery; Colonoscopy; Appendectomy; pr colonoscopy flx dx w/collj spec when pfrmd (N/A, 7/17/2017); Breast lumpectomy; Dilation and curettage of uterus; Incisional breast biopsy; and Breast excisional biopsy (Left)  Her family history includes Aneurysm in her sister; Breast cancer in her family; Breast cancer (age of onset: 36) in her cousin; Breast cancer (age of onset: 43) in her maternal aunt; Breast cancer (age of onset: 52) in her maternal grandmother; Breast cancer (age of onset: 68) in her mother; Colon cancer in her family; Colon cancer (age of onset: 61) in her father; Colon cancer (age of onset: 58) in her maternal grandfather; Coronary artery disease in her family; Emphysema in her family; Hyperlipidemia in her family; No Known Problems in her daughter, maternal aunt, paternal grandfather, paternal grandmother, sister, and son; Osteoarthritis in her family  She  reports that she has never smoked  She has never used smokeless tobacco  She reports current alcohol use of about 1 0 standard drink of alcohol per week  She reports that she does not use drugs    Current Outpatient Medications   Medication Sig Dispense Refill    aspirin 81 mg chewable tablet Chew 1 tablet (81 mg total) daily  0    cholecalciferol (VITAMIN D3) 1,000 units tablet Take 1,000 Units by mouth daily      clindamycin (CLEOCIN T) 1 % 1 pad 2 (two) times a day       clotrimazole-betamethasone (LOTRISONE) 1-0 05 % cream Apply topically 2 (two) times a day 60 g 1    doxycycline hyclate (VIBRAMYCIN) 50 mg capsule 50 mg as needed       ergocalciferol (VITAMIN D2) 50,000 units TAKE ONE CAPSULE BY MOUTH MONTHLY (Patient not taking: Reported on 2/28/2022) 3 capsule 3    LORazepam (Ativan) 0 5 mg tablet Take 1 tablet (0 5 mg total) by mouth every 8 (eight) hours as needed for anxiety (AS NEEDED FOR DENTAL WORK) 20 tablet 0    spironolactone (ALDACTONE) 50 mg tablet Take 2 tabs bid daily      triamcinolone (KENALOG) 0 1 % ointment  (Patient not taking: Reported on 2/28/2022 )       No current facility-administered medications for this visit  She is allergic to other, azithromycin, and penicillins       Review of Systems   Constitutional: Positive for fatigue  Negative for activity change  HENT: Negative  Eyes: Negative  Respiratory: Negative  Cardiovascular: Negative  Gastrointestinal: Negative  Endocrine: Positive for cold intolerance  Genitourinary: Negative  Musculoskeletal: Negative  Skin: Negative  Allergic/Immunologic: Negative  Neurological: Positive for headaches  Psychiatric/Behavioral: Negative for agitation, behavioral problems and confusion  The patient is not nervous/anxious  All other systems reviewed and are negative  Objective:      /66   Pulse 72   Ht 5' 4" (1 626 m)   Wt 64 kg (141 lb)   BMI 24 20 kg/m²          Physical Exam  Constitutional:       Appearance: Normal appearance  She is well-developed  HENT:      Head: Normocephalic and atraumatic  Nose: Nose normal    Eyes:      Extraocular Movements: Extraocular movements intact  Conjunctiva/sclera: Conjunctivae normal    Neck:      Comments: No dominant thyroid nodules noted  Cardiovascular:      Rate and Rhythm: Normal rate and regular rhythm  Pulmonary:      Effort: Pulmonary effort is normal    Musculoskeletal:      Cervical back: Normal range of motion and neck supple  No rigidity or tenderness  Lymphadenopathy:      Cervical: No cervical adenopathy  Skin:     General: Skin is warm and dry  Neurological:      Mental Status: She is alert and oriented to person, place, and time     Psychiatric:         Mood and Affect: Mood normal

## 2022-03-23 ENCOUNTER — APPOINTMENT (OUTPATIENT)
Dept: LAB | Age: 62
End: 2022-03-23
Payer: COMMERCIAL

## 2022-03-23 DIAGNOSIS — E04.1 THYROID NODULE: Primary | ICD-10-CM

## 2022-03-23 LAB
T4 FREE SERPL-MCNC: 0.96 NG/DL (ref 0.76–1.46)
TSH SERPL DL<=0.05 MIU/L-ACNC: 1.58 UIU/ML (ref 0.36–3.74)

## 2022-03-23 PROCEDURE — 84443 ASSAY THYROID STIM HORMONE: CPT

## 2022-03-23 PROCEDURE — 84439 ASSAY OF FREE THYROXINE: CPT

## 2022-03-23 PROCEDURE — 36415 COLL VENOUS BLD VENIPUNCTURE: CPT

## 2022-03-30 ENCOUNTER — TREATMENT (OUTPATIENT)
Dept: SURGERY | Facility: CLINIC | Age: 62
End: 2022-03-30

## 2022-03-30 DIAGNOSIS — E04.1 THYROID NODULE: Primary | ICD-10-CM

## 2022-04-21 ENCOUNTER — TELEPHONE (OUTPATIENT)
Dept: INTERNAL MEDICINE CLINIC | Facility: CLINIC | Age: 62
End: 2022-04-21

## 2022-04-21 NOTE — TELEPHONE ENCOUNTER
Asmita is scheduled for bladder surgery on 6/20/22  This was originally supposed to be done in 2019  She has a form to be filled out within 60 DAYS OF surgery and an ekg is needed     Please advise    appt with dr Orion Hopkins end of august

## 2022-05-02 ENCOUNTER — APPOINTMENT (OUTPATIENT)
Dept: LAB | Age: 62
End: 2022-05-02
Payer: COMMERCIAL

## 2022-05-02 DIAGNOSIS — R39.15 URGENCY OF URINATION: ICD-10-CM

## 2022-05-02 DIAGNOSIS — Z01.812 PRE-OPERATIVE LABORATORY EXAMINATION: ICD-10-CM

## 2022-05-02 LAB
ANION GAP SERPL CALCULATED.3IONS-SCNC: 6 MMOL/L (ref 4–13)
BUN SERPL-MCNC: 15 MG/DL (ref 5–25)
CALCIUM SERPL-MCNC: 9.6 MG/DL (ref 8.3–10.1)
CHLORIDE SERPL-SCNC: 103 MMOL/L (ref 100–108)
CO2 SERPL-SCNC: 28 MMOL/L (ref 21–32)
CREAT SERPL-MCNC: 0.81 MG/DL (ref 0.6–1.3)
ERYTHROCYTE [DISTWIDTH] IN BLOOD BY AUTOMATED COUNT: 12.4 % (ref 11.6–15.1)
GFR SERPL CREATININE-BSD FRML MDRD: 77 ML/MIN/1.73SQ M
GLUCOSE SERPL-MCNC: 97 MG/DL (ref 65–140)
HCT VFR BLD AUTO: 43.2 % (ref 34.8–46.1)
HGB BLD-MCNC: 13.6 G/DL (ref 11.5–15.4)
MCH RBC QN AUTO: 29.9 PG (ref 26.8–34.3)
MCHC RBC AUTO-ENTMCNC: 31.5 G/DL (ref 31.4–37.4)
MCV RBC AUTO: 95 FL (ref 82–98)
PLATELET # BLD AUTO: 281 THOUSANDS/UL (ref 149–390)
PMV BLD AUTO: 12.2 FL (ref 8.9–12.7)
POTASSIUM SERPL-SCNC: 3.9 MMOL/L (ref 3.5–5.3)
RBC # BLD AUTO: 4.55 MILLION/UL (ref 3.81–5.12)
SODIUM SERPL-SCNC: 137 MMOL/L (ref 136–145)
WBC # BLD AUTO: 7.3 THOUSAND/UL (ref 4.31–10.16)

## 2022-05-02 PROCEDURE — 80048 BASIC METABOLIC PNL TOTAL CA: CPT

## 2022-05-02 PROCEDURE — 93005 ELECTROCARDIOGRAM TRACING: CPT

## 2022-05-02 PROCEDURE — 36415 COLL VENOUS BLD VENIPUNCTURE: CPT

## 2022-05-02 PROCEDURE — 85027 COMPLETE CBC AUTOMATED: CPT

## 2022-05-03 PROCEDURE — 93010 ELECTROCARDIOGRAM REPORT: CPT | Performed by: INTERNAL MEDICINE

## 2022-05-09 ENCOUNTER — OFFICE VISIT (OUTPATIENT)
Dept: INTERNAL MEDICINE CLINIC | Facility: CLINIC | Age: 62
End: 2022-05-09
Payer: COMMERCIAL

## 2022-05-09 VITALS
HEART RATE: 72 BPM | HEIGHT: 64 IN | RESPIRATION RATE: 12 BRPM | SYSTOLIC BLOOD PRESSURE: 120 MMHG | DIASTOLIC BLOOD PRESSURE: 78 MMHG | BODY MASS INDEX: 24.41 KG/M2 | WEIGHT: 143 LBS

## 2022-05-09 DIAGNOSIS — R10.30 LOWER ABDOMINAL PAIN: ICD-10-CM

## 2022-05-09 DIAGNOSIS — Z01.818 PREOPERATIVE EXAM FOR GYNECOLOGIC SURGERY: ICD-10-CM

## 2022-05-09 DIAGNOSIS — N39.3 STRESS INCONTINENCE: ICD-10-CM

## 2022-05-09 DIAGNOSIS — Z01.818 PREOPERATIVE CLEARANCE: Primary | ICD-10-CM

## 2022-05-09 DIAGNOSIS — R10.9 ABDOMINAL PAIN, UNSPECIFIED ABDOMINAL LOCATION: ICD-10-CM

## 2022-05-09 PROCEDURE — 99215 OFFICE O/P EST HI 40 MIN: CPT | Performed by: INTERNAL MEDICINE

## 2022-05-09 PROCEDURE — 93000 ELECTROCARDIOGRAM COMPLETE: CPT | Performed by: INTERNAL MEDICINE

## 2022-05-09 NOTE — PROGRESS NOTES
Assessment/Plan:  1  General care-medically clear for surgery  There was a question of an abnormal EKG but repeat EKG done today is normal   She exercises on a regular basis and has no cardiopulmonary symptoms I feel she can proceed with surgery she will hold all of her vitamins a week surgery  She will hold aspirin 5 days before surgery  She will skip her spironolactone day before and day of surgery  2  Stress urinary incontinence-patient scheduled for pelvic sling in [place]  3  Recent episode of abdominal pain-last 7 days-benign exam   Reviewed about the shortage IV contrast currently in our system and I therefore recommended she have an ultrasound of the abdomen pelvis  If these are benign feel we can proceed with surgery if she has recurrent symptoms in the future she may require CT scan with contrast   Rule out adhesions as the cause of the pain-rule out diverticular disease-  4  Thyroid nodule-had follow-up thyroid ultrasound done in March read as unchanged-liver radiology then commented right lower pole nodule does meet criteria for biopsy potentially  She saw surgery-Dr Barlow feels that all nodules are stable and she should have repeat ultrasound in a with follow-up visit-she commented if it continues to enlarge he would then proceed with ultrasound-guided needle aspiration    All other problems as per note February 28th 1022  Medical regimen unchanged  This patient will be seen at previously scheduled appointment with previously scheduled labs  as before she is aware 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 and she will now be seen by Dr Emelia Ace as her PCP    Addendum-ultrasound of the abdomen normal than 3 millimeter gallbladder polyp felt to be benign with additional evaluation not needed  Ultrasound of the pelvis normal     Addendum-patient had normal mammogram done in June 2022  No problem-specific Assessment & Plan notes found for this encounter         Diagnoses and all orders for this visit:    Preoperative clearance  -     POCT ECG    Preoperative exam for gynecologic surgery    Abdominal pain, unspecified abdominal location  -     US abdomen and pelvis with transvaginal; Future    Lower abdominal pain    Stress incontinence          Subjective:      Patient ID: Saran Martinez is a 60 y o  female  She is seen clear her today for gyn surgery  She has a longstanding history of stress incontinence and scheduled for a pelvic sling on June 20th  She denies any chest pain or pressure with activity  She denies shortness of breath  She denies calf pain with walking  She exercises-walks on a regular basis without any issues  She is able to go up and down 2 flights of steps  This patient denies any systemic symptoms  Specifically there has been no evidence of fever, night sweats, significant weight loss or significant decrease in appetite  On preoperative testing was a question of an abnormal EKG  I personally reviewed EKG and she has he less in V2  EKG was repeated today and is normal   Old EKG was normal   She had an echocardiogram done in 2019 1 being evaluated for neurologic symptoms  This does not appear clinically significant  She talked about abdominal pain  She had 7 days of abdominal pain  She noted in the mid abdomen and lower abdomen on right  She had no fever  She did not have any change in her bowels  She did not have any associated urinary symptoms  There was no dysuria frequency or hematuria  She felt the pain was sharp  Was on related to eating  It was annoying and mild but not severe  Has resolved  She may have had similar pain in the past   She has had a prior appendectomy  The pain was in the area of the appendectomy scar  She has not had any vaginal discharge  She has not had any vaginal bleeding      This patient denies any systemic symptoms   Specifically there has been no evidence of fever, night sweats, significant weight loss or significant decrease in appetite  Results for orders placed or performed in visit on 22  -ECG 12 lead:        Result                      Value             Ref Range           Ventricular Rate            69                BPM                 Atrial Rate                 69                BPM                 KY Interval                 154               ms                  QRSD Interval               70                ms                  QT Interval                 384               ms                  QTC Interval                411               ms                  P Axis                      50                degrees             QRS Axis                    43                degrees             T Wave Axis                 42                degrees          Laboratory testing done prior to visit shows chemistry profile normal with BUN of 15 creatinine 0 81 with GFR 77 CBC normal     Echocardiogram from 2019 reviewed in detail  Know about thisSt  [address] [facility], [address] [place] [address]  [phone]     Transthoracic Echocardiogram  2D, M-mode, Doppler, and Color Doppler     Study date:  06-Sep-2019     Patient: Maggy Cat  MR number: [id number]  Account number: [de-identified]  : 1960  Age: 60 years  Gender: Female  Status: Outpatient  Location: [address] [place] [address] [facility]  Height: 64 in  Weight: 139 9 lb  BP: 100/ 55 mmHg     Indications: Evaluate for suspected cardiac source of embolism      Diagnoses: G45 9 - Transient cerebral ischemic attack, unspecified     Sonographer:  Tosha Mora RDCS  Referring Physician:  Geovani Sharp MD  Group:  [facility]  Interpreting Physician:  Jadyn Jimenez DO     SUMMARY     LEFT VENTRICLE:  Systolic function was normal by visual assessment  Ejection fraction was estimated to be 60 %  There were no regional wall motion abnormalities    Doppler parameters were consistent with abnormal left ventricular relaxation (grade 1 diastolic dysfunction)      ATRIAL SEPTUM:  There was a probable patent foramen ovale  A bubble study was performed and there was early crossing of bubbles seen on left side of heart     HISTORY: PRIOR HISTORY: hyperlipidemia     PROCEDURE: The study was performed in the [address] [facility] [place] [facility]  This was a routine study  The transthoracic approach was used  The study included complete 2D imaging, M-mode, complete spectral Doppler, and color Doppler  The  heart rate was 64 bpm, at the start of the study  Images were obtained from the parasternal, apical, subcostal, and suprasternal notch acoustic windows  Intravenous contrast (agitated saline) was administered  Image quality was adequate      LEFT VENTRICLE: Size was normal  Systolic function was normal by visual assessment  Ejection fraction was estimated to be 60 %  There were no regional wall motion abnormalities  DOPPLER: Doppler parameters were consistent with abnormal  left ventricular relaxation (grade 1 diastolic dysfunction)      RIGHT VENTRICLE: The size was normal  Systolic function was normal      LEFT ATRIUM: Size was normal      ATRIAL SEPTUM: There was a probable patent foramen ovale  A bubble study was performed and there was early crossing of bubbles seen on left side of heart     RIGHT ATRIUM: Size was normal      MITRAL VALVE: Valve structure was normal  DOPPLER: There was no significant regurgitation      AORTIC VALVE: The valve was trileaflet  Leaflets exhibited good mobility  DOPPLER: There was no evidence for stenosis      TRICUSPID VALVE: The valve structure was normal  DOPPLER: There was no significant regurgitation      PULMONIC VALVE: Not well visualized      PERICARDIUM: There was no pericardial effusion   The pericardium was normal in appearance      AORTA: The root exhibited normal size      MEASUREMENT TABLES     DOPPLER MEASUREMENTS  Tricuspid valve   (Reference normals)  RV-RA peak gradient   18 mmHg   (--)     SYSTEM MEASUREMENT TABLES     2D  %FS: 31 94 %  Ao Diam: 3 63 cm  EDV(Teich): 80 26 ml  EF(Cube): 68 48 %  EF(Teich): 60 4 %  ESV(Cube): 23 99 ml  ESV(Teich): 31 78 ml  IVSd: 0 67 cm  LA Area: 11 75 cm2  LA Diam: 2 89 cm  LVEDV MOD A4C: 78 37 ml  LVEF MOD A4C: 51 06 %  LVESV MOD A4C: 38 36 ml  LVIDd: 4 24 cm  LVIDs: 2 88 cm  LVLd A4C: 7 17 cm  LVLs A4C: 6 03 cm  LVPWd: 0 79 cm  RA Area: 11 04 cm2  RV Diam : 2 68 cm  SV MOD A4C: 40 01 ml  SV(Cube): 52 12 ml  SV(Teich): 48 48 ml     CW  TR Vmax: 2 13 m/s  TR maxP 13 mmHg     MM  TAPSE: 1 59 cm     PW  E': 0 08 m/s  E/E': 7 41  MV A Jp: 0 77 m/s  MV Dec Grundy: 4 08 m/s2  MV DecT: 153 58 ms  MV E Jp: 0 63 m/s  MV E/A Ratio: 0 82     [employer] Accredited [employer] a good urologist was [place]     Prepared and electronically signed by  Waylon Dugan DO  Signed 06-Sep-2019 10:06:25     She talked about a recent COVID exposure a week and half ago  She had a minimal exposure outside without any close contact although she was 5+ feet away from the individual   She has been vaccinated and does not have any symptoms      The following portions of the patient's history were reviewed and updated as appropriate: allergies, current medications, past family history, past medical history, past social history, past surgical history and problem list     Review of Systems   Constitutional: Negative  HENT: Negative  Respiratory: Negative  Cardiovascular: Negative  Gastrointestinal: Positive for abdominal pain  Endocrine: Negative  Genitourinary: Negative  Stress incontinence   Musculoskeletal: Negative  Skin: Negative  Neurological: Negative  Hematological: Negative  Psychiatric/Behavioral: Negative  Objective:      Ht 5' 4" (1 626 m)   Wt 64 9 kg (143 lb)   BMI 24 55 kg/m²          Physical Exam  Vitals reviewed     Constitutional:       General: She is not in acute distress  Appearance: Normal appearance  She is not ill-appearing, toxic-appearing or diaphoretic  HENT:      Head: Normocephalic and atraumatic  Right Ear: External ear normal       Left Ear: External ear normal       Nose: Nose normal  No congestion or rhinorrhea  Mouth/Throat:      Mouth: Mucous membranes are moist       Pharynx: Oropharynx is clear  No oropharyngeal exudate or posterior oropharyngeal erythema  Eyes:      General: No scleral icterus  Right eye: No discharge  Left eye: No discharge  Extraocular Movements: Extraocular movements intact  Conjunctiva/sclera: Conjunctivae normal       Pupils: Pupils are equal, round, and reactive to light  Neck:      Vascular: No carotid bruit  Cardiovascular:      Rate and Rhythm: Normal rate and regular rhythm  Pulses: Normal pulses  Heart sounds: Normal heart sounds  No murmur heard  No friction rub  No gallop  Pulmonary:      Effort: Pulmonary effort is normal  No respiratory distress  Breath sounds: Normal breath sounds  No stridor  No wheezing, rhonchi or rales  Chest:      Chest wall: No tenderness  Abdominal:      General: Abdomen is flat  Bowel sounds are normal  There is no distension  Palpations: Abdomen is soft  There is no mass  Tenderness: There is no abdominal tenderness  There is no right CVA tenderness, left CVA tenderness, guarding or rebound  Hernia: No hernia is present  Comments: Old abdominal scar   Musculoskeletal:         General: No swelling, tenderness, deformity or signs of injury  Normal range of motion  Cervical back: Normal range of motion and neck supple  No rigidity  No muscular tenderness  Right lower leg: No edema  Left lower leg: No edema  Lymphadenopathy:      Cervical: No cervical adenopathy  Skin:     General: Skin is warm and dry  Coloration: Skin is not jaundiced or pale        Findings: No bruising, erythema, lesion or rash  Neurological:      General: No focal deficit present  Mental Status: She is alert and oriented to person, place, and time  Mental status is at baseline  Cranial Nerves: No cranial nerve deficit  Sensory: No sensory deficit  Motor: No weakness  Coordination: Coordination normal       Gait: Gait normal       Deep Tendon Reflexes: Reflexes normal    Psychiatric:         Mood and Affect: Mood normal          Behavior: Behavior normal          Thought Content:  Thought content normal          Judgment: Judgment normal

## 2022-05-17 ENCOUNTER — HOSPITAL ENCOUNTER (OUTPATIENT)
Dept: RADIOLOGY | Age: 62
Discharge: HOME | End: 2022-05-17
Payer: COMMERCIAL

## 2022-05-17 DIAGNOSIS — R10.9 ABDOMINAL PAIN, UNSPECIFIED ABDOMINAL LOCATION: ICD-10-CM

## 2022-05-17 PROCEDURE — 76856 US EXAM PELVIC COMPLETE: CPT

## 2022-05-17 PROCEDURE — 76700 US EXAM ABDOM COMPLETE: CPT

## 2022-05-17 PROCEDURE — 76830 TRANSVAGINAL US NON-OB: CPT

## 2022-05-19 ENCOUNTER — TELEPHONE (OUTPATIENT)
Dept: INTERNAL MEDICINE CLINIC | Facility: CLINIC | Age: 62
End: 2022-05-19

## 2022-05-19 NOTE — TELEPHONE ENCOUNTER
----- Message from Dwane Krabbe, MD sent at 5/19/2022  5:37 AM EDT -----  Please call patient-ultrasound of abdomen and pelvis normal other than small benign gallbladder polyp which needs no additional evaluation-this is a benign finding

## 2022-06-01 ENCOUNTER — HOSPITAL ENCOUNTER (OUTPATIENT)
Dept: RADIOLOGY | Age: 62
Discharge: HOME | End: 2022-06-01
Payer: COMMERCIAL

## 2022-06-01 VITALS — HEIGHT: 64 IN | WEIGHT: 143 LBS | BODY MASS INDEX: 24.41 KG/M2

## 2022-06-01 DIAGNOSIS — Z12.31 ENCOUNTER FOR SCREENING MAMMOGRAM FOR MALIGNANT NEOPLASM OF BREAST: ICD-10-CM

## 2022-06-01 PROCEDURE — 77063 BREAST TOMOSYNTHESIS BI: CPT

## 2022-06-01 PROCEDURE — 77067 SCR MAMMO BI INCL CAD: CPT

## 2022-06-09 LAB
ATRIAL RATE: 69 BPM
P AXIS: 50 DEGREES
PR INTERVAL: 154 MS
QRS AXIS: 43 DEGREES
QRSD INTERVAL: 70 MS
QT INTERVAL: 384 MS
QTC INTERVAL: 411 MS
T WAVE AXIS: 42 DEGREES
VENTRICULAR RATE: 69 BPM

## 2022-06-10 NOTE — PRE-PROCEDURE INSTRUCTIONS
Pre-Surgery Instructions:   Medication Instructions    aspirin 81 mg chewable tablet Stop taking 7 days prior to surgery  OK as per PCP and Surgeon  LD 6/12/22    cholecalciferol (VITAMIN D3) 1,000 units tablet Stop taking 7 days prior to surgery   clindamycin (CLEOCIN T) 1 % Stop taking 7 days prior to surgery   clotrimazole-betamethasone (LOTRISONE) 1-0 05 % cream Stop taking 7 days prior to surgery   conjugated estrogens (PREMARIN) vaginal cream Stop taking 7 days prior to surgery  As per Surgeon    doxycycline hyclate (VIBRAMYCIN) 50 mg capsule Stop taking 7 days prior to surgery  As per PCP    spironolactone (ALDACTONE) 50 mg tablet Stop taking 7 days prior to surgery  As per PCP         Covid screening negative as per patient  Reviewed with patient via phone all medication instructions  Advised not to take any NSAID's, Vitamins or Herbal products for 7 days prior to the DOS  Acetaminophen products are ok to take  Reviewed showering and Bowel Prep instructions as given by surgical office  Instructed to call office with any questions or concerns  Instructed about NPO after midnight the night before DOS, except sips of water with allowed medications in AM on DOS  Informed about call from [facility] with the time to arrive for the scheduled surgery  Patient verbalized understanding

## 2022-06-17 ENCOUNTER — ANESTHESIA EVENT (OUTPATIENT)
Dept: PERIOP | Facility: HOSPITAL | Age: 62
End: 2022-06-17
Payer: COMMERCIAL

## 2022-06-20 ENCOUNTER — ANESTHESIA (OUTPATIENT)
Dept: PERIOP | Facility: HOSPITAL | Age: 62
End: 2022-06-20
Payer: COMMERCIAL

## 2022-06-20 ENCOUNTER — HOSPITAL ENCOUNTER (OUTPATIENT)
Facility: HOSPITAL | Age: 62
Setting detail: OUTPATIENT SURGERY
Discharge: HOME | End: 2022-06-20
Attending: OBSTETRICS & GYNECOLOGY | Admitting: OBSTETRICS & GYNECOLOGY
Payer: COMMERCIAL

## 2022-06-20 VITALS
HEART RATE: 65 BPM | DIASTOLIC BLOOD PRESSURE: 65 MMHG | BODY MASS INDEX: 24.94 KG/M2 | WEIGHT: 145.28 LBS | RESPIRATION RATE: 16 BRPM | OXYGEN SATURATION: 98 % | SYSTOLIC BLOOD PRESSURE: 112 MMHG | TEMPERATURE: 98.4 F

## 2022-06-20 DIAGNOSIS — N81.6 RECTOCELE: Primary | ICD-10-CM

## 2022-06-20 PROBLEM — N36.41 HYPERMOBILITY OF URETHRA: Status: ACTIVE | Noted: 2022-06-20

## 2022-06-20 PROBLEM — N39.3 STRESS INCONTINENCE (FEMALE) (MALE): Status: ACTIVE | Noted: 2022-06-20

## 2022-06-20 PROCEDURE — C1771 REP DEV, URINARY, W/SLING: HCPCS | Performed by: OBSTETRICS & GYNECOLOGY

## 2022-06-20 DEVICE — MID-URETHRAL SLING ASSEMBLY, FLAT NEEDLES
Type: IMPLANTABLE DEVICE | Site: VAGINA | Status: FUNCTIONAL
Brand: ATHENA SURGICAL RMUS SYSTEM

## 2022-06-20 RX ORDER — ACETAMINOPHEN 325 MG/1
975 TABLET ORAL ONCE
Status: COMPLETED | OUTPATIENT
Start: 2022-06-20 | End: 2022-06-20

## 2022-06-20 RX ORDER — FENTANYL CITRATE/PF 50 MCG/ML
50 SYRINGE (ML) INJECTION
Status: DISCONTINUED | OUTPATIENT
Start: 2022-06-20 | End: 2022-06-20 | Stop reason: HOSPADM

## 2022-06-20 RX ORDER — SENNOSIDES 8.6 MG
1300 CAPSULE ORAL EVERY 8 HOURS PRN
Qty: 30 TABLET | Refills: 0
Start: 2022-06-20

## 2022-06-20 RX ORDER — SODIUM CHLORIDE 9 MG/ML
125 INJECTION, SOLUTION INTRAVENOUS CONTINUOUS
Status: DISCONTINUED | OUTPATIENT
Start: 2022-06-20 | End: 2022-06-20 | Stop reason: HOSPADM

## 2022-06-20 RX ORDER — FENTANYL CITRATE 50 UG/ML
INJECTION, SOLUTION INTRAMUSCULAR; INTRAVENOUS AS NEEDED
Status: DISCONTINUED | OUTPATIENT
Start: 2022-06-20 | End: 2022-06-20

## 2022-06-20 RX ORDER — DOCUSATE SODIUM 100 MG/1
100 CAPSULE, LIQUID FILLED ORAL 2 TIMES DAILY
Refills: 0
Start: 2022-06-20

## 2022-06-20 RX ORDER — HYDROMORPHONE HCL/PF 1 MG/ML
0.5 SYRINGE (ML) INJECTION
Status: DISCONTINUED | OUTPATIENT
Start: 2022-06-20 | End: 2022-06-20 | Stop reason: HOSPADM

## 2022-06-20 RX ORDER — ONDANSETRON 2 MG/ML
INJECTION INTRAMUSCULAR; INTRAVENOUS AS NEEDED
Status: DISCONTINUED | OUTPATIENT
Start: 2022-06-20 | End: 2022-06-20

## 2022-06-20 RX ORDER — CEFAZOLIN SODIUM 2 G/50ML
2000 SOLUTION INTRAVENOUS ONCE
Status: COMPLETED | OUTPATIENT
Start: 2022-06-20 | End: 2022-06-20

## 2022-06-20 RX ORDER — ONDANSETRON 2 MG/ML
4 INJECTION INTRAMUSCULAR; INTRAVENOUS ONCE AS NEEDED
Status: DISCONTINUED | OUTPATIENT
Start: 2022-06-20 | End: 2022-06-20 | Stop reason: HOSPADM

## 2022-06-20 RX ORDER — PROPOFOL 10 MG/ML
INJECTION, EMULSION INTRAVENOUS CONTINUOUS PRN
Status: DISCONTINUED | OUTPATIENT
Start: 2022-06-20 | End: 2022-06-20

## 2022-06-20 RX ORDER — GABAPENTIN 400 MG/1
400 CAPSULE ORAL ONCE
Status: COMPLETED | OUTPATIENT
Start: 2022-06-20 | End: 2022-06-20

## 2022-06-20 RX ORDER — ACETAMINOPHEN 325 MG/1
975 TABLET ORAL EVERY 6 HOURS SCHEDULED
Status: DISCONTINUED | OUTPATIENT
Start: 2022-06-20 | End: 2022-06-20 | Stop reason: HOSPADM

## 2022-06-20 RX ORDER — ONDANSETRON 2 MG/ML
4 INJECTION INTRAMUSCULAR; INTRAVENOUS EVERY 6 HOURS PRN
Status: DISCONTINUED | OUTPATIENT
Start: 2022-06-20 | End: 2022-06-20 | Stop reason: HOSPADM

## 2022-06-20 RX ORDER — DEXAMETHASONE SODIUM PHOSPHATE 10 MG/ML
INJECTION, SOLUTION INTRAMUSCULAR; INTRAVENOUS AS NEEDED
Status: DISCONTINUED | OUTPATIENT
Start: 2022-06-20 | End: 2022-06-20

## 2022-06-20 RX ORDER — IBUPROFEN 600 MG/1
600 TABLET ORAL EVERY 6 HOURS SCHEDULED
Status: DISCONTINUED | OUTPATIENT
Start: 2022-06-20 | End: 2022-06-20 | Stop reason: HOSPADM

## 2022-06-20 RX ORDER — MIDAZOLAM HYDROCHLORIDE 2 MG/2ML
INJECTION, SOLUTION INTRAMUSCULAR; INTRAVENOUS AS NEEDED
Status: DISCONTINUED | OUTPATIENT
Start: 2022-06-20 | End: 2022-06-20

## 2022-06-20 RX ORDER — IBUPROFEN 600 MG/1
600 TABLET ORAL EVERY 6 HOURS PRN
Qty: 30 TABLET | Refills: 0
Start: 2022-06-20

## 2022-06-20 RX ADMIN — DEXAMETHASONE SODIUM PHOSPHATE 10 MG: 10 INJECTION INTRAMUSCULAR; INTRAVENOUS at 09:24

## 2022-06-20 RX ADMIN — SODIUM CHLORIDE 125 ML/HR: 0.9 INJECTION, SOLUTION INTRAVENOUS at 08:07

## 2022-06-20 RX ADMIN — FENTANYL CITRATE 50 MCG: 50 INJECTION INTRAMUSCULAR; INTRAVENOUS at 10:24

## 2022-06-20 RX ADMIN — ACETAMINOPHEN 975 MG: 325 TABLET, FILM COATED ORAL at 08:05

## 2022-06-20 RX ADMIN — PROPOFOL 50 MCG/KG/MIN: 10 INJECTION, EMULSION INTRAVENOUS at 09:19

## 2022-06-20 RX ADMIN — ONDANSETRON 4 MG: 2 INJECTION INTRAMUSCULAR; INTRAVENOUS at 10:18

## 2022-06-20 RX ADMIN — GABAPENTIN 400 MG: 400 CAPSULE ORAL at 08:05

## 2022-06-20 RX ADMIN — MIDAZOLAM 2 MG: 1 INJECTION INTRAMUSCULAR; INTRAVENOUS at 09:12

## 2022-06-20 RX ADMIN — SODIUM CHLORIDE 125 ML/HR: 0.9 INJECTION, SOLUTION INTRAVENOUS at 11:03

## 2022-06-20 RX ADMIN — FENTANYL CITRATE 50 MCG: 50 INJECTION INTRAMUSCULAR; INTRAVENOUS at 09:14

## 2022-06-20 RX ADMIN — CEFAZOLIN SODIUM 2000 MG: 2 SOLUTION INTRAVENOUS at 09:14

## 2022-06-20 NOTE — OP NOTE
OPERATIVE REPORT  PATIENT NAME: José Schmidt    :  1960  MRN: [id number]  Pt Location: [facility] OR ROOM 04    SURGERY DATE: 2022    Surgeon(s) and Role: * Fatemeh Coughlin MD - Primary     * Alida Abdullahi MD - Fellow, first assisting    Preop Diagnosis:  Rectocele [N81 6]  Stress incontinence (female) (male) [N39 3]  Hypermobility of urethra [N36 41]    Post-Op Diagnosis Codes:     * Rectocele [N81 6]     * Stress incontinence (female) (male) [N39 3]     * Hypermobility of urethra [N36 41]    Procedure(s) (LRB):    RETROPUBIC SLING (DIAZ)  CYSTOSCOPY (N/A)  POSTERIOR COLPORRHAPHY (N/A)  INCISION AND DRAINAGE OF LABIAL INCLUSION CYST    Specimen(s):  * No specimens in log *    Estimated Blood Loss:   25 mL    Drains:  * No LDAs found *    Anesthesia Type:   IV Sedation with Anesthesia    Operative Indications:  Rectocele [N81 6]  Stress incontinence (female) (male) [N39 3]  Hypermobility of urethra [N36 41]    Operative Findings:  Severe vulvovaginal atrophy, stage 1 rectocele, no apical or anterior wall prolapse  Cystoscopy x 2: No mesh, suture material, or injury noted to bladder lumen and tracks of sling placement visualized with movement of trocar prior to removal      Complications:   None    Procedure and Technique:  Appropriate preoperative antibiotics chosen per ACOG guidelines were given  Bilateral SCDs were placed in the lower extremities for DVT prevention prior to the institution of anesthesia  The patient was identified in the holding area by the operating room staff and attending physician  She was taken to the operating room where anesthesia was instituted without complications  She was placed in the dorsal lithotomy position with the legs in [address] [place] [address] with care taken to avoid excessive flexion or extension of her lower extremities  The patient was prepped and draped in the usual sterile fashion  A Perry catheter was inserted  We started the retropubic sling procedure   We identified two areas on the anterior abdominal wall 2 fingerbreadths above the level of pubic bone 2 fingerbreadths lateral to the midline of the pubic bone  We put two skin marks and then we infiltrated with local anesthetic solution at that area and then underneath the pubic bone  We then turned our attention to the anterior vaginal wall  The mid urethral level was identified at the level of the Perry catheter, and local anesthetic solution was injected into the anterior vaginal wall at the midurethral level for hydrodissection and vasoconstriction  Then, local was  injected at the midline and to the left and right of midline directing the infiltration laterally towards the cephalad aspect of the inferior pubic ramus bilaterally  Care was taken to ensure that the sulcus was flattened and free of infiltration to minimize the chance for erosion  After completion of hydrodissection, 2 Allis clamps were used to grasp the anterior vaginal wall for traction, and a 1 5 cm incision was made at the midline  Two Allis clamps were then placed on each cut edge of the incision for stabilization  Tenotomy scissors were used to create a small vaginal tunnel with sharp dissection in the anterior vaginal wall directing the dissection lateral to the midline, but going towards the pubic bone  Dissection was carried out to the edge of the pubic bone itself, and we did this bilaterally  Once the dissection was complete, the Perry was used to drain the bladder The rigid catheter guide stylette was placed through the Perry and used for deflection of the bladder, and this was placed without complications  We deviated the bladder to the left side of the patient, and we passed the Shattuck introducer trocar into the pre-dissected tract on the right side of the patient   Once we pierced the pubocervical fascia and passed the bone, we directed our trajectory of the trocar towards the anterior abdominal wall towards the premarked skin on the right side the patient  Once we were at the level of the skin, we incised the skin at the calvin with a scalpel, and the trocar was passed through that incision  The handle  was removed leaving the trocar in place  We removed the Perry and we performed a cystoscopy by instilling normal saline inside the bladder  At this time, we saw the tract outside the bladder of the previously placed trocar and we did not see any lacerations or injuries to the bladder  The trocar was rolled without puckering of the bladder mucosa  We reinserted the Perry catheter to drain the bladder  A Stephen clamp was placed over the sling arm and the introducer was cut off with scissors  We replaced the Perry catheter and the bladder was drained  We repeated the same sequence of steps for the placement of the left side  Once the cystoscopy was repeated, we did not see any laceration on the left side upon the bladder being filled with fluid again up to 300 mL  A stephen was placed to hold the sling arm and the introducer was detached from the sling arm with scissors  An area of inclusion cyst was also noted on left labia and a stab wound with a scalpel was made  Solid yellow material was able to be expressed with pressure over the mid portion of the labia majora  We woke the patient and had her cough as we adjusted the sling until we saw minimal to no leakage of fluid  Once the sling was tensioned properly we removed the plastic sleeves surrounding the sling arms by gently pulling them up through the skin incisions bilaterally with a Stephen clamp  During this step we had a hemostat placed suburethrally to ensure the sling did not tension more than the position it was in prior to pulling on the sling arms  The sling arms were then trimmed to the level of the skin  The incision in the vaginal mucosa was closed with 2-0 Vicryl suture in a running locked stitch  Good hemostasis was achieved        Attention was then turned to the posterior compartment where 2 Allis clamps were placed in the posterior fourchette over the mucocutaneous border to reduce the markedly relaxed vaginal outlet  Dilute Marcaine solution was injected into the perineum  A mirella-shaped incision was made extending from the vaginal mucosa onto the perineum  The overlying skin was removed en bloc  We then began closure of the posterior colporrhaphy with a 2-0 Vicryl suture in a running locked stitch  We reapproximated the perineal body with a 0-Vicryl interrupted suture  We closed the remainder of the colporrhaphy to the level of the hymen  We closed the perineal skin with 2-0 Vicryl in a subcuticular fashion  The puncture sites from the sling over the mons were cleaned and closed with Exofin skin glue  The Perry catheter was removed at the end of the procedure  The sponge, needle and instrument count were correct x 2  The patient tolerated the procedure well  She was awakened from anesthesia and transferred to the recovery room in stable condition  No qualified resident was available for the procedure  Dr Ofelia Disla was present for the entire procedure      Patient Disposition:  PACU       SIGNATURE: Geetha Maddox MD  DATE: June 20, 2022  TIME: 10:33 AM

## 2022-06-20 NOTE — DISCHARGE INSTRUCTIONS
Urethral Sling Procedure   WHAT YOU NEED TO KNOW:   A bladder sling procedure is surgery to treat urinary incontinence in women  The sling acts as a hammock to keep your urethra in place and hold it closed when your bladder is full  You may have vaginal bleeding or discharge for up to a week after your surgery  Use sanitary pads  Do not use tampons  You may have some pelvic discomfort or trouble urinating  DISCHARGE INSTRUCTIONS:   Call 911 for any of the following: You have sudden trouble breathing  Seek care immediately if:   Your bleeding gets worse  You have yellow or foul smelling discharge from your vagina  You cannot urinate, or you are urinating less than what is normal for you  You feel confused  Contact your healthcare provider if:   You have a fever  You do not feel like you are able to empty your bladder completely when you urinate  You feel the need to urinate very suddenly  You have burning or stinging when you urinate  You have blood in your urine  Your skin is itchy, swollen, or you have a rash  You have questions or concerns about your condition or care  Medicines:   Prescription pain medicine  may be given  Ask your how to take this medicine safely  Take your medicine as directed  Contact your healthcare provider if you think your medicine is not helping or if you have side effects  Tell him or her if you are allergic to any medicine  Keep a list of the medicines, vitamins, and herbs you take  Include the amounts, and when and why you take them  Bring the list or the pill bottles to follow-up visits  Carry your medicine list with you in case of an emergency  Self-catheterization:  You may need to put a catheter into your bladder after you urinate to empty any remaining urine  A catheter is a small rubber tube used to drain urine  Healthcare providers will teach you how to put the catheter in safely   This may be needed until you are completely emptying your bladder  Perry catheter: You may have a Perry catheter for a short period of time  The Perry is a tube put into your bladder to drain urine into a bag  Keep the bag below your waist  This will prevent urine from flowing back into your bladder and causing an infection or other problems  Also, keep the tube free of kinks so the urine will drain properly  Do not pull on the catheter  This can cause pain and bleeding, and may cause the catheter to come out  Activity:  Do not lift heavy objects for 6 weeks after your procedure  Do not have intercourse for 4 to 6 weeks  Do not use a tampon for 4 weeks  Ask your healthcare provider when you can return to work or your usual activities  Do pelvic muscle exercises: These are also called Kegel exercises  These exercises help strengthen your pelvic muscles and help prevent urine leakage  Tighten the muscles of your pelvis and hold them tight for 5 seconds, then relax for 5 seconds  Gradually work up to tightening them for 10 seconds and relaxing for 10 seconds  Do this 3 times each day  Keep a record:  Keep a record of when you urinate and if you leak any urine  Write down what you were doing when you leaked urine, such as coughing or sneezing  Bring the log to your follow-up visits  Prevent constipation:  Drink liquids as directed  You may need to drink more water than usual to soften your bowel movements  Eat a variety of healthy foods, especially fruit and foods high in fiber  You may need to use an over-the-counter bowel movement softener  Follow up with your healthcare provider as directed: You may need a test to check how much urine remains in your bladder after you urinate  This will help show how the sling is working  Write down your questions so you remember to ask them during your visits  © 2017 [employer] Information is for End User's use only and may not be sold, redistributed or otherwise used for commercial purposes   All illustrations and images included in CareNotes® are the copyrighted property of A D A M , Inc  or [employer]  The above information is an  only  It is not intended as medical advice for individual conditions or treatments  Talk to your doctor, nurse or pharmacist before following any medical regimen to see if it is safe and effective for you  Posterior Vaginal Repair   WHAT YOU NEED TO KNOW:   A posterior vaginal repair is surgery to fix a rectocele or vaginal hernia  DISCHARGE INSTRUCTIONS:   Medicines:   Pain medicine  will help take away or decrease pain  Do not wait until the pain is severe before you take your medicine  NSAIDs , such as ibuprofen, help decrease swelling, pain, and fever  This medicine is available with or without a doctor's order  NSAIDs can cause stomach bleeding or kidney problems in certain people  If you take blood thinner medicine, always ask your healthcare provider if NSAIDs are safe for you  Always read the medicine label and follow directions  Bowel movement softeners  make it easier for you to have a bowel movement  You may need this medicine to treat or prevent constipation  Take your medicine as directed  Contact your healthcare provider if you think your medicine is not helping or if you have side effects  Tell him or her if you are allergic to any medicine  Keep a list of the medicines, vitamins, and herbs you take  Include the amounts, and when and why you take them  Bring the list or the pill bottles to follow-up visits  Carry your medicine list with you in case of an emergency  Follow up with your gynecologist in 2 weeks: You will need to return to have your incision checked  Write down your questions so you remember to ask them during your visits  Self-care:   Do not have sex  until your healthcare provider says it is okay  Do not put anything in your vagina  for 6 weeks after the surgery   This allows time for the wound to heal      Do not lift more than 10 pounds  for at least 6 weeks  Heavy lifting puts pressure on the surgery area and slows healing  Avoid heavy exercise  the first few weeks after the surgery  You may try light activity, such as short walks, 3 to 4 weeks after the surgery  Try not to cough or strain to have a bowel movement  This may cause damage to the surgery area  Ask your healthcare provider about ways to make bowel movements easier so you do not have to strain  Eat healthy foods and drink liquids as directed  This will help prevent constipation  Healthy foods include fruits, vegetables, whole-grain breads, low-fat dairy products, beans, lean meats, and fish  Contact your healthcare provider or gynecologist if:   You have vaginal pain that does not go away, even after you take pain medicine  You have pus or a foul-smelling discharge from your vagina  You have pain during sex  You have a fever or chills  Your wound is red, swollen, or draining pus  You have questions or concerns about your condition or care  Seek care immediately or call 911 if:   You soak a sanitary pad with blood every hour for 4 hours  You feel something is bulging out into your vagina or rectum and not going back in  You cannot urinate  © 2017 [address] [employer]  Information is for End User's use only and may not be sold, redistributed or otherwise used for commercial purposes  All illustrations and images included in CareNotes® are the copyrighted property of A D A M , Inc  or E[employer] Dominguez  The above information is an  only  It is not intended as medical advice for individual conditions or treatments  Talk to your doctor, nurse or pharmacist before following any medical regimen to see if it is safe and effective for you

## 2022-06-20 NOTE — ANESTHESIA PREPROCEDURE EVALUATION
Procedure:  POSTERIOR COLPORRHAPHY (N/A Perineum)  RETROPUBIC SLING (N/A Vagina )  CYSTOSCOPY (N/A Bladder)    Relevant Problems   ANESTHESIA   (+) PONV (postoperative nausea and vomiting) (2/2 inhalational anesthetics, no problem with TIVA)      CARDIO   (+) Hyperlipidemia      NEURO/PSYCH   (+) Anxiety   (+) History of TIA (transient ischemic attack)   (+) Other headache syndrome      PULMONARY   (+) Asthma      Cardiovascular and Mediastinum   (+) Patent foramen ovale      Other   (+) Facial droop   (+) Hyper reflexia   (+) Stress incontinence   negative stress test 2019 for H/O CP SOB     Physical Exam    Airway    Mallampati score: II  TM Distance: >3 FB  Neck ROM: full     Dental   No notable dental hx     Cardiovascular  Rhythm: regular, Rate: normal,     Pulmonary  Breath sounds clear to auscultation,     Other Findings        Anesthesia Plan  ASA Score- 2     Anesthesia Type- IV sedation with anesthesia with ASA Monitors  Additional Monitors:   Airway Plan:           Plan Factors-Exercise tolerance (METS): >4 METS  Chart reviewed  Patient is not a current smoker  Induction- intravenous  Postoperative Plan-     Informed Consent- Anesthetic plan and risks discussed with patient

## 2022-06-20 NOTE — ANESTHESIA POSTPROCEDURE EVALUATION
Post-Op Assessment Note    CV Status:  Stable  Pain Score: 1    Pain management: adequate     Mental Status:  Alert and awake   Hydration Status:  Euvolemic   PONV Controlled:  Controlled   Airway Patency:  Patent      Post Op Vitals Reviewed: Yes      Staff: Anesthesiologist         No complications documented      /58 (06/20/22 1029)    Temp 97 7 °F (36 5 °C) (06/20/22 1029)    Pulse 79 (06/20/22 1029)   Resp 15 (06/20/22 1029)    SpO2 95 % (06/20/22 1029)

## 2022-09-20 ENCOUNTER — APPOINTMENT (OUTPATIENT)
Dept: LAB | Facility: MEDICAL CENTER | Age: 62
End: 2022-09-20
Payer: COMMERCIAL

## 2022-09-20 DIAGNOSIS — R73.9 HYPERGLYCEMIA: ICD-10-CM

## 2022-09-20 DIAGNOSIS — E55.9 VITAMIN D DEFICIENCY: ICD-10-CM

## 2022-09-20 DIAGNOSIS — E78.5 HYPERLIPIDEMIA, UNSPECIFIED HYPERLIPIDEMIA TYPE: ICD-10-CM

## 2022-09-20 LAB
25(OH)D3 SERPL-MCNC: 61 NG/ML (ref 30–100)
ALBUMIN SERPL BCP-MCNC: 4 G/DL (ref 3.5–5)
ALP SERPL-CCNC: 70 U/L (ref 46–116)
ALT SERPL W P-5'-P-CCNC: 23 U/L (ref 12–78)
ANION GAP SERPL CALCULATED.3IONS-SCNC: 2 MMOL/L (ref 4–13)
AST SERPL W P-5'-P-CCNC: 12 U/L (ref 5–45)
BILIRUB SERPL-MCNC: 0.48 MG/DL (ref 0.2–1)
BUN SERPL-MCNC: 15 MG/DL (ref 5–25)
CALCIUM SERPL-MCNC: 9.6 MG/DL (ref 8.3–10.1)
CHLORIDE SERPL-SCNC: 105 MMOL/L (ref 96–108)
CHOLEST SERPL-MCNC: 174 MG/DL
CO2 SERPL-SCNC: 28 MMOL/L (ref 21–32)
CREAT SERPL-MCNC: 0.78 MG/DL (ref 0.6–1.3)
EST. AVERAGE GLUCOSE BLD GHB EST-MCNC: 108 MG/DL
GFR SERPL CREATININE-BSD FRML MDRD: 81 ML/MIN/1.73SQ M
GLUCOSE P FAST SERPL-MCNC: 85 MG/DL (ref 65–99)
HBA1C MFR BLD: 5.4 %
HDLC SERPL-MCNC: 84 MG/DL
LDLC SERPL DIRECT ASSAY-MCNC: 83 MG/DL (ref 0–100)
POTASSIUM SERPL-SCNC: 4.4 MMOL/L (ref 3.5–5.3)
PROT SERPL-MCNC: 7.9 G/DL (ref 6.4–8.4)
SODIUM SERPL-SCNC: 135 MMOL/L (ref 135–147)
TRIGL SERPL-MCNC: 53 MG/DL

## 2022-09-20 PROCEDURE — 80053 COMPREHEN METABOLIC PANEL: CPT

## 2022-09-20 PROCEDURE — 80061 LIPID PANEL: CPT

## 2022-09-20 PROCEDURE — 82306 VITAMIN D 25 HYDROXY: CPT

## 2022-09-20 PROCEDURE — 83036 HEMOGLOBIN GLYCOSYLATED A1C: CPT

## 2022-09-20 PROCEDURE — 83721 ASSAY OF BLOOD LIPOPROTEIN: CPT

## 2022-09-20 PROCEDURE — 36415 COLL VENOUS BLD VENIPUNCTURE: CPT

## 2022-09-22 ENCOUNTER — HOSPITAL ENCOUNTER (OUTPATIENT)
Dept: RADIOLOGY | Age: 62
Discharge: HOME | End: 2022-09-22
Payer: COMMERCIAL

## 2022-09-22 DIAGNOSIS — E04.1 THYROID NODULE: ICD-10-CM

## 2022-09-22 PROCEDURE — 76536 US EXAM OF HEAD AND NECK: CPT

## 2022-09-23 ENCOUNTER — OFFICE VISIT (OUTPATIENT)
Dept: INTERNAL MEDICINE CLINIC | Facility: CLINIC | Age: 62
End: 2022-09-23
Payer: COMMERCIAL

## 2022-09-23 VITALS
HEIGHT: 64 IN | WEIGHT: 145.6 LBS | BODY MASS INDEX: 24.86 KG/M2 | SYSTOLIC BLOOD PRESSURE: 114 MMHG | OXYGEN SATURATION: 98 % | HEART RATE: 75 BPM | DIASTOLIC BLOOD PRESSURE: 74 MMHG

## 2022-09-23 DIAGNOSIS — E55.9 VITAMIN D DEFICIENCY: ICD-10-CM

## 2022-09-23 DIAGNOSIS — Z12.11 SCREENING FOR COLON CANCER: ICD-10-CM

## 2022-09-23 DIAGNOSIS — E78.5 HYPERLIPIDEMIA, UNSPECIFIED HYPERLIPIDEMIA TYPE: ICD-10-CM

## 2022-09-23 DIAGNOSIS — E04.1 THYROID NODULE: Primary | ICD-10-CM

## 2022-09-23 PROCEDURE — 99396 PREV VISIT EST AGE 40-64: CPT | Performed by: INTERNAL MEDICINE

## 2022-09-24 NOTE — PROGRESS NOTES
Assessment/Plan:    #Urinary Incontinence  -underwent posterior colporrhaphy, I&D of iclusion labial cyst, retropubic sling and cystoscopy June 2022  -now asymptomatic  -has history of inflammatory bladder neck polyps with squamous metaplasia and was on macrodantin suppression in the past    #Thyroid Nodule  -US thyroid with multiple nodules  -seeing surgery  -will surveillance every 6 months    #Gallbladder Polyp  -noted on US abdomen and stable  -3mm gallbladder polyp and does not further surveillance    #Atherosclerosis Aortic Arch  -now on aspirin  -defers statin and CT coronary calcium for now    #TIA  -had numbness in left arm  -CTA head with minimal atherosclerosis aorta  -MRI brain nromal  -CXR without issues  -hypercoagulable workup negative  -ECHO EF 60% with grade 1 diastolic dysfunction, probable patent foramen ovale  -US DVT negative  -saw neurology  -underwent EEG normal  -remains on aspirin    #HLD  -LDL 83 HDL 84  -unable to tolerate atorvastatin in the past    #PFO  -neurology and cardiology did not feel treatment was necessary at this time    #Roscea  -seeing dermatology  -previously on doxycycline during flare ups  -remains on spironolactone    #Family History  -mother with premenopausal breast ca  -grandmother with premenopausal breast ca  -family history with colon ca  -negative genetic testing    #Health Maintenance  -routine labs and followup 6 months  -covid booster pending  -is an  for urgent care  -flu vaccine to obtain at work  -mammogram up to date 2022  -colonoscopy due    No problem-specific Assessment & Plan notes found for this encounter  Diagnoses and all orders for this visit:    Thyroid nodule  -     CBC and differential; Future  -     Comprehensive metabolic panel; Future    Hyperlipidemia, unspecified hyperlipidemia type  -     CBC and differential; Future  -     Comprehensive metabolic panel; Future  -     Lipid Panel with Direct LDL reflex;  Future    Vitamin D deficiency  -     CBC and differential; Future  -     Comprehensive metabolic panel; Future    Screening for colon cancer  -     Ambulatory referral for colonoscopy; Future            Current Outpatient Medications:     acetaminophen (TYLENOL) 650 mg CR tablet, Take 2 tablets (1,300 mg total) by mouth every 8 (eight) hours as needed for mild pain, Disp: 30 tablet, Rfl: 0    aspirin 81 mg chewable tablet, Chew 1 tablet (81 mg total) daily, Disp: , Rfl: 0    cholecalciferol (VITAMIN D3) 1,000 units tablet, Take 1,000 Units by mouth daily, Disp: , Rfl:     clindamycin (CLEOCIN T) 1 %, 1 pad 2 (two) times a day , Disp: , Rfl:     docusate sodium (COLACE) 100 mg capsule, Take 1 capsule (100 mg total) by mouth 2 (two) times a day, Disp: , Rfl: 0    doxycycline hyclate (VIBRAMYCIN) 50 mg capsule, 50 mg as needed , Disp: , Rfl:     ibuprofen (MOTRIN) 600 mg tablet, Take 1 tablet (600 mg total) by mouth every 6 (six) hours as needed for moderate pain, Disp: 30 tablet, Rfl: 0    LORazepam (Ativan) 0 5 mg tablet, Take 1 tablet (0 5 mg total) by mouth every 8 (eight) hours as needed for anxiety (AS NEEDED FOR DENTAL WORK), Disp: 20 tablet, Rfl: 0    spironolactone (ALDACTONE) 50 mg tablet, Take 2 tabs bid daily, Disp: , Rfl:     Subjective:      Patient ID: Es Mead is a 60 y o  female  HPI     Patient presents for routine checkup  Her A1c is stable at 5 4 LDL 83 and HDL 84  Blood pressure is controlled with spironolactone  She will continue with this  She underwent a pelvic sling placement for urinary incontinence and has been doing well  She denies any urinary issues at this time  She works for [employer] and will get the flu shot there  She will also obtain the COVID booster later this season  Mammogram was up-to-date  She is due for colonoscopy in give the referral for this  He has a history of a 3 mm gallbladder polyp but was benign and does not require any further surveillance    She will return to care in 6 months  The following portions of the patient's history were reviewed and updated as appropriate: allergies, current medications, past family history, past medical history, past social history, past surgical history and problem list     Review of Systems   Constitutional: Negative for activity change, appetite change, chills, fatigue and fever  HENT: Negative for congestion, ear pain, facial swelling, hearing loss, sore throat, tinnitus and trouble swallowing  Eyes: Negative for photophobia and visual disturbance  Respiratory: Negative for cough, shortness of breath and wheezing  Cardiovascular: Negative for chest pain and leg swelling  Gastrointestinal: Negative for abdominal distention, abdominal pain, blood in stool, nausea and vomiting  Genitourinary: Negative for difficulty urinating, dysuria and pelvic pain  Musculoskeletal: Negative for arthralgias, back pain, gait problem, joint swelling, myalgias, neck pain and neck stiffness  Skin: Negative for rash and wound  Neurological: Negative for dizziness, tremors, light-headedness and headaches  Objective:      /74   Pulse 75   Ht 5' 4" (1 626 m)   Wt 66 kg (145 lb 9 6 oz)   SpO2 98%   BMI 24 99 kg/m²          Physical Exam  Vitals reviewed  Constitutional:       Appearance: Normal appearance  She is well-developed  HENT:      Head: Normocephalic and atraumatic  Right Ear: External ear normal       Left Ear: External ear normal       Nose: Nose normal  No congestion or rhinorrhea  Mouth/Throat:      Pharynx: No oropharyngeal exudate or posterior oropharyngeal erythema  Eyes:      Conjunctiva/sclera: Conjunctivae normal       Pupils: Pupils are equal, round, and reactive to light  Neck:      Thyroid: No thyromegaly  Vascular: No JVD  Cardiovascular:      Rate and Rhythm: Normal rate and regular rhythm  Pulses: Normal pulses  Heart sounds: Normal heart sounds   No murmur heard  Pulmonary:      Effort: Pulmonary effort is normal  No respiratory distress  Breath sounds: Normal breath sounds  No stridor  No wheezing, rhonchi or rales  Abdominal:      General: Bowel sounds are normal  There is no distension  Palpations: Abdomen is soft  There is no mass  Tenderness: There is no abdominal tenderness  There is no right CVA tenderness, left CVA tenderness, guarding or rebound  Musculoskeletal:         General: No tenderness  Normal range of motion  Cervical back: Normal range of motion and neck supple  Right lower leg: No edema  Left lower leg: No edema  Lymphadenopathy:      Cervical: No cervical adenopathy  Skin:     General: Skin is warm and dry  Findings: No erythema or rash  Neurological:      Mental Status: She is alert and oriented to person, place, and time  Mental status is at baseline  Deep Tendon Reflexes: Reflexes are normal and symmetric  This note was completed in part utilizing [employer] direct voice recognition software  Grammatical errors, random word insertion, spelling mistakes, and incomplete sentences may be an occasional consequence of the system secondary to software limitations, ambient noise and hardware issues  At the time of dictation, efforts were made to edit, clarify and /or correct errors  Please read the chart carefully and recognize, using context, where substitutions have occurred  If you have any questions or concerns about the context, text or information contained within the body of this dictation, please contact myself, the provider, for further clarification

## 2022-10-12 PROBLEM — J01.80 OTHER ACUTE SINUSITIS: Status: RESOLVED | Noted: 2022-01-09 | Resolved: 2022-10-12

## 2022-10-27 ENCOUNTER — OFFICE VISIT (OUTPATIENT)
Dept: URGENT CARE | Age: 62
End: 2022-10-27
Payer: COMMERCIAL

## 2022-10-27 VITALS
RESPIRATION RATE: 18 BRPM | HEART RATE: 77 BPM | SYSTOLIC BLOOD PRESSURE: 108 MMHG | OXYGEN SATURATION: 98 % | TEMPERATURE: 97.8 F | DIASTOLIC BLOOD PRESSURE: 58 MMHG

## 2022-10-27 DIAGNOSIS — J39.8 CONGESTION OF UPPER RESPIRATORY TRACT: Primary | ICD-10-CM

## 2022-10-27 DIAGNOSIS — R06.02 SHORTNESS OF BREATH: ICD-10-CM

## 2022-10-27 PROCEDURE — 93005 ELECTROCARDIOGRAM TRACING: CPT

## 2022-10-27 PROCEDURE — 87636 SARSCOV2 & INF A&B AMP PRB: CPT

## 2022-10-27 PROCEDURE — 99213 OFFICE O/P EST LOW 20 MIN: CPT

## 2022-10-27 RX ORDER — ALBUTEROL SULFATE 90 UG/1
2 AEROSOL, METERED RESPIRATORY (INHALATION) EVERY 6 HOURS PRN
Qty: 8.5 G | Refills: 0 | Status: SHIPPED | OUTPATIENT
Start: 2022-10-27

## 2022-10-27 RX ORDER — PREDNISONE 20 MG/1
20 TABLET ORAL 2 TIMES DAILY WITH MEALS
Qty: 10 TABLET | Refills: 0 | Status: SHIPPED | OUTPATIENT
Start: 2022-10-27 | End: 2022-10-27

## 2022-10-27 RX ORDER — ALBUTEROL SULFATE 90 UG/1
2 AEROSOL, METERED RESPIRATORY (INHALATION) EVERY 6 HOURS PRN
Qty: 8.5 G | Refills: 0 | Status: SHIPPED | OUTPATIENT
Start: 2022-10-27 | End: 2022-10-27

## 2022-10-27 RX ORDER — PREDNISONE 20 MG/1
20 TABLET ORAL 2 TIMES DAILY WITH MEALS
Qty: 10 TABLET | Refills: 0 | Status: SHIPPED | OUTPATIENT
Start: 2022-10-27 | End: 2022-11-01

## 2022-10-27 NOTE — PROGRESS NOTES
[facility]        NAME: Israel Angeles is a 60 y o  female  : 1960    MRN: [id number]  DATE: 2022  TIME: 4:05 PM    Assessment and Plan   Congestion of upper respiratory tract [J39 8]  1  Congestion of upper respiratory tract  Cov/Flu-Collected at [facility]   2  Shortness of breath  albuterol (ProAir HFA) 90 mcg/act inhaler    predniSONE 20 mg tablet    EKG reviewed, no abnormality noted, awaiting official interpretation  Discussed option of performing a chest x-ray, patient desires to hold off at this point  Albuterol inhaler reordered, will trial short course of prednisone for possible lingering airway inflammation after recent upper respiratory tract infection  Patient advised to continue over-the-counter decongestants/humidification as needed  Report for immediate re-evaluation of shortness of breath worsens or if chest pain or palpitations occur  Patient Instructions   Shortness of Breath   WHAT YOU NEED TO KNOW:   Shortness of breath is a feeling that you cannot get enough air when you breathe in  You may have this feeling only during activity, or all the time  Your symptoms can range from mild to severe  Shortness of breath may be a sign of a serious health condition that needs immediate care  DISCHARGE INSTRUCTIONS:   Return to the emergency department if:   · Your signs and symptoms are the same or worse within 24 hours of treatment       · The skin over your ribs or on your neck sinks in when you breathe       · You feel confused or dizzy      Contact your healthcare provider if:   · You have new or worsening symptoms      · You have questions or concerns about your condition or care      Medicines:   · Medicines  may be used to treat the cause of your symptoms  You may need medicine to treat a bacterial infection or reduce anxiety  Other medicines may be used to open your airway, reduce swelling, or remove extra fluid   If you have a heart condition, you may need medicine to help your heart beat more strongly or regularly      · Take your medicine as directed  Contact your healthcare provider if you think your medicine is not helping or if you have side effects  Tell him or her if you are allergic to any medicine  Keep a list of the medicines, vitamins, and herbs you take  Include the amounts, and when and why you take them  Bring the list or the pill bottles to follow-up visits  Carry your medicine list with you in case of an emergency      Manage shortness of breath:   · Create an action plan  You and your healthcare provider can work together to create a plan for how to handle shortness of breath  The plan can include daily activities, treatment changes, and what to do if you have severe breathing problems      · Lean forward on your elbows when you sit  This helps your lungs expand and may make it easier to breathe      · Use pursed-lip breathing any time you feel short of breath  Breathe in through your nose and then slowly breathe out through your mouth with your lips slightly puckered  It should take you twice as long to breathe out as it did to breathe in           · Do not smoke  Nicotine and other chemicals in cigarettes and cigars can cause lung damage and make shortness of breath worse  Ask your healthcare provider for information if you currently smoke and need help to quit  E-cigarettes or smokeless tobacco still contain nicotine  Talk to your healthcare provider before you use these products      · Reach or maintain a healthy weight  Your healthcare provider can help you create a safe weight loss plan if you are overweight      · Exercise as directed  Exercise can help your lungs work more easily  Exercise can also help you lose weight if needed   Try to get at least 30 minutes of exercise most days of the week      Follow up with your healthcare provider or specialist as directed:  Write down your questions so you remember to ask them during your visits  © Copyright [employer] 2022 Information is for End User's use only and may not be sold, redistributed or otherwise used for commercial purposes  All illustrations and images included in CareNotes® are the copyrighted property of A [employer] Inc  or Joe [employer]  The above information is an  only  It is not intended as medical advice for individual conditions or treatments  Talk to your doctor, nurse or pharmacist before following any medical regimen to see if it is safe and effective for you            Follow up with PCP in 3-5 days  Proceed to  ER if symptoms worsen  Chief Complaint     Chief Complaint   Patient presents with   • Shortness of Breath     Per patient she feels trouble catching her breath  History of Present Illness       Patient is a 42-year-old female with past medical history significant for asthma, who presents for evaluation of intermittent shortness of breath over the past 24 hours  She reports that she had been ill on 10/16/2022 with cough and congestion, but notes that the symptoms have subsided  She denies chest pain, palpitations, fatigue, fever, lightheadedness/dizziness/syncope  She reports that the shortness of breath is only occasional, and occurs when she is doing things  Review of Systems   Review of Systems   Constitutional: Negative for chills, fatigue and fever  HENT: Negative for congestion, ear discharge, ear pain, postnasal drip, rhinorrhea, sinus pressure, sinus pain, sneezing and sore throat  Eyes: Negative  Negative for pain, discharge, redness, itching and visual disturbance  Respiratory: Positive for shortness of breath  Negative for apnea, cough, choking, chest tightness, wheezing and stridor  Cardiovascular: Negative  Negative for chest pain and palpitations  Gastrointestinal: Negative  Negative for abdominal pain, diarrhea, nausea and vomiting  Endocrine: Negative    Negative for polydipsia, polyphagia and polyuria  Genitourinary: Negative  Negative for decreased urine volume, dysuria, flank pain and hematuria  Musculoskeletal: Negative  Negative for arthralgias, back pain, myalgias, neck pain and neck stiffness  Skin: Negative  Negative for color change and rash  Allergic/Immunologic: Negative  Negative for environmental allergies  Neurological: Negative  Negative for dizziness, seizures, syncope, facial asymmetry, light-headedness, numbness and headaches  Hematological: Negative  Negative for adenopathy  Psychiatric/Behavioral: Negative  All other systems reviewed and are negative          Current Medications       Current Outpatient Medications:   •  acetaminophen (TYLENOL) 650 mg CR tablet, Take 2 tablets (1,300 mg total) by mouth every 8 (eight) hours as needed for mild pain, Disp: 30 tablet, Rfl: 0  •  albuterol (ProAir HFA) 90 mcg/act inhaler, Inhale 2 puffs every 6 (six) hours as needed for wheezing or shortness of breath, Disp: 8 5 g, Rfl: 0  •  aspirin 81 mg chewable tablet, Chew 1 tablet (81 mg total) daily, Disp: , Rfl: 0  •  cholecalciferol (VITAMIN D3) 1,000 units tablet, Take 1,000 Units by mouth daily, Disp: , Rfl:   •  clindamycin (CLEOCIN T) 1 %, 1 pad 2 (two) times a day , Disp: , Rfl:   •  docusate sodium (COLACE) 100 mg capsule, Take 1 capsule (100 mg total) by mouth 2 (two) times a day, Disp: , Rfl: 0  •  doxycycline hyclate (VIBRAMYCIN) 50 mg capsule, 50 mg as needed , Disp: , Rfl:   •  ibuprofen (MOTRIN) 600 mg tablet, Take 1 tablet (600 mg total) by mouth every 6 (six) hours as needed for moderate pain, Disp: 30 tablet, Rfl: 0  •  LORazepam (Ativan) 0 5 mg tablet, Take 1 tablet (0 5 mg total) by mouth every 8 (eight) hours as needed for anxiety (AS NEEDED FOR DENTAL WORK), Disp: 20 tablet, Rfl: 0  •  predniSONE 20 mg tablet, Take 1 tablet (20 mg total) by mouth 2 (two) times a day with meals for 5 days, Disp: 10 tablet, Rfl: 0  •  spironolactone (ALDACTONE) 50 mg tablet, Take 2 tabs bid daily, Disp: , Rfl:     Current Allergies     Allergies as of 10/27/2022 - Reviewed 10/27/2022   Allergen Reaction Noted   • Other Nausea Only and Vomiting 08/29/2014   • Azithromycin Other (See Comments) 03/09/2013   • Latex  05/27/2022   • Penicillins Rash 07/14/2017            The following portions of the patient's history were reviewed and updated as appropriate: allergies, current medications, past family history, past medical history, past social history, past surgical history and problem list      Past Medical History:   Diagnosis Date   • Abnormal cervical Papanicolaou smear    • Acne     LAST ASSESSED: 1/17/15   • Asthma    • BRCA1 negative    • Change in bowel habit    • Depression    • Family hx of colon cancer requiring screening colonoscopy     father colon cancer/ maternal grandfather colon cancer   • Long term use of drug     LAST ASSESSED: 10/3/13   • Migraine    • PONV (postoperative nausea and vomiting)    • Retinal tear    • Thyroid nodule 2020   • TIA (transient ischemic attack)    • 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     LAST ASSESSED: 3/9/13   • Vascular headache        Past Surgical History:   Procedure Laterality Date   • APPENDECTOMY     • BREAST EXCISIONAL BIOPSY Left 2006    benign   • COLONOSCOPY     • DILATION AND CURETTAGE OF UTERUS     • INCISIONAL BREAST BIOPSY     • [place] COLONOSCOPY FLX DX W/COLLJ SPEC WHEN PFRMD N/A 07/17/2017    Procedure: COLONOSCOPY;  Surgeon: Harriet Marquez MD;  [facility];   Service: Colorectal   • [place] CYSTOURETHROSCOPY N/A 6/20/2022    Procedure: CYSTOSCOPY;  Surgeon: Armin Arita MD;  [facility];  Service: UroGynecology          • [place] POST COLPORRHAPHY,RECTUM/VAGINA N/A 6/20/2022    Procedure: POSTERIOR COLPORRHAPHY,  I&D OF ICLUSION LABIAL CYST;  Surgeon: Armin Arita MD;  [facility];  Service: UroGynecology          • [place] SLING OPER STRES INCONTINENCE N/A 6/20/2022    Procedure: Queen Savant; Surgeon: Priscilla Boles MD;  Location: [facility];  Service: UroGynecology          • RETINOPATHY SURGERY      retinal tear repair x3       Family History   Problem Relation Age of Onset   • Breast cancer Mother 68   • Colon cancer Father 61   • Aneurysm Sister         brain   • Breast cancer Maternal Grandmother 52   • Coronary artery disease Family    • Breast cancer Family    • Colon cancer Family    • Hyperlipidemia Family    • Emphysema Family         INTERSTITIAL   • Osteoarthritis Family    • Breast cancer Maternal Aunt 42   • Colon cancer Maternal Grandfather 58   • No Known Problems Paternal Grandmother    • No Known Problems Paternal Grandfather    • Breast cancer Cousin 36   • No Known Problems Daughter    • No Known Problems Sister    • No Known Problems Son    • No Known Problems Maternal Aunt          Medications have been verified  Objective   /58   Pulse 77   Temp 97 8 °F (36 6 °C)   Resp 18   SpO2 98%        Physical Exam     Physical Exam  Vitals and nursing note reviewed  Constitutional:       General: She is not in acute distress  Appearance: Normal appearance  She is not ill-appearing, toxic-appearing or diaphoretic  Interventions: She is not intubated  HENT:      Head: Normocephalic and atraumatic  Right Ear: Tympanic membrane normal       Left Ear: Tympanic membrane normal       Nose: Nose normal  No congestion or rhinorrhea  Mouth/Throat:      Mouth: Mucous membranes are moist       Pharynx: No pharyngeal swelling, oropharyngeal exudate or posterior oropharyngeal erythema  Eyes:      Extraocular Movements: Extraocular movements intact  Conjunctiva/sclera: Conjunctivae normal       Pupils: Pupils are equal, round, and reactive to light  Neck:      Thyroid: No thyromegaly  Cardiovascular:      Rate and Rhythm: Normal rate and regular rhythm  Pulses: Normal pulses        Heart sounds: Normal heart sounds, S1 normal and S2 normal  Heart sounds not distant  No murmur heard  No friction rub  No gallop  Pulmonary:      Effort: Pulmonary effort is normal  No tachypnea, bradypnea, accessory muscle usage, prolonged expiration, respiratory distress or retractions  She is not intubated  Breath sounds: No stridor, decreased air movement or transmitted upper airway sounds  Examination of the right-lower field reveals decreased breath sounds  Examination of the left-lower field reveals decreased breath sounds  Decreased breath sounds present  No wheezing, rhonchi or rales  Abdominal:      General: Bowel sounds are normal       Palpations: Abdomen is soft  Tenderness: There is no abdominal tenderness  There is no guarding or rebound  Musculoskeletal:         General: Normal range of motion  Cervical back: Normal range of motion and neck supple  No tenderness  Lymphadenopathy:      Cervical: No cervical adenopathy  Skin:     General: Skin is warm and dry  Capillary Refill: Capillary refill takes less than 2 seconds  Neurological:      General: No focal deficit present  Mental Status: She is alert and oriented to person, place, and time  Cranial Nerves: No cranial nerve deficit     Psychiatric:         Mood and Affect: Mood normal          Behavior: Behavior normal

## 2022-10-28 LAB
ATRIAL RATE: 70 BPM
FLUAV RNA RESP QL NAA+PROBE: NEGATIVE
FLUBV RNA RESP QL NAA+PROBE: NEGATIVE
P AXIS: 25 DEGREES
PR INTERVAL: 146 MS
QRS AXIS: 28 DEGREES
QRSD INTERVAL: 66 MS
QT INTERVAL: 382 MS
QTC INTERVAL: 412 MS
SARS-COV-2 RNA RESP QL NAA+PROBE: NEGATIVE
T WAVE AXIS: 25 DEGREES
VENTRICULAR RATE: 70 BPM

## 2022-12-13 ENCOUNTER — TELEPHONE (OUTPATIENT)
Dept: URGENT CARE | Facility: MEDICAL CENTER | Age: 62
End: 2022-12-13

## 2022-12-13 ENCOUNTER — APPOINTMENT (OUTPATIENT)
Dept: RADIOLOGY | Facility: MEDICAL CENTER | Age: 62
End: 2022-12-13

## 2022-12-13 ENCOUNTER — OFFICE VISIT (OUTPATIENT)
Dept: URGENT CARE | Facility: MEDICAL CENTER | Age: 62
End: 2022-12-13

## 2022-12-13 VITALS
SYSTOLIC BLOOD PRESSURE: 139 MMHG | TEMPERATURE: 97.6 F | OXYGEN SATURATION: 99 % | HEIGHT: 64 IN | BODY MASS INDEX: 24.92 KG/M2 | WEIGHT: 146 LBS | DIASTOLIC BLOOD PRESSURE: 62 MMHG | HEART RATE: 86 BPM | RESPIRATION RATE: 18 BRPM

## 2022-12-13 DIAGNOSIS — S49.91XA RIGHT SHOULDER INJURY, INITIAL ENCOUNTER: Primary | ICD-10-CM

## 2022-12-13 DIAGNOSIS — S46.819A STRAIN OF TRAPEZIUS MUSCLE, UNSPECIFIED LATERALITY, INITIAL ENCOUNTER: ICD-10-CM
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
## 2022-12-13 DIAGNOSIS — S49.91XA RIGHT SHOULDER INJURY, INITIAL ENCOUNTER: ICD-10-CM

## 2022-12-13 RX ORDER — CYCLOBENZAPRINE HCL 10 MG
10 TABLET ORAL 3 TIMES DAILY PRN
Status: SHIPPED | OUTPATIENT
Start: 2022-12-13

## 2022-12-13 RX ORDER — IBUPROFEN 800 MG/1
800 TABLET ORAL EVERY 6 HOURS PRN
Qty: 30 TABLET | Refills: 0 | Status: SHIPPED | OUTPATIENT
Start: 2022-12-13

## 2022-12-13 NOTE — TELEPHONE ENCOUNTER
Patient was called and informed that x-rays were unremarkable for fractures dislocation    Patient expressed verbal understanding

## 2022-12-13 NOTE — LETTER
December 13, 2022     Patient: Enrique Whitmore   YOB: 1960   Date of Visit: 12/13/2022       To Whom it May Concern:    Mallory Carmen was seen in my clinic on 12/13/2022  She may return to work on 12/16/2022  If you have any questions or concerns, please don't hesitate to call           Sincerely,          German Landers PA-C        CC: No Recipients

## 2022-12-13 NOTE — LETTER
December 13, 2022     Patient: Any Lagos   YOB: 1960   Date of Visit: 12/13/2022       To Whom it May Concern:    Mary Anne Jiménez was seen in my clinic on 12/13/2022  She may return to work on 12/15/2022  If you have any questions or concerns, please don't hesitate to call           Sincerely,          Maddison Gabriel PA-C        CC: No Recipients
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
## 2022-12-13 NOTE — PATIENT INSTRUCTIONS
Muscle Spasm   WHAT YOU NEED TO KNOW:   A muscle spasm is a sudden contraction of any muscle or group of muscles  A muscle cramp is a painful muscle spasm  Muscle cramps commonly occur after intense exercise or during pregnancy  They may also be caused by certain medications, dehydration, low calcium or magnesium levels, or another medical condition  DISCHARGE INSTRUCTIONS:   Medicines: You may need the following:  NSAIDs  help decrease swelling and pain or fever  This medicine is available with or without a doctor's order  NSAIDs can cause stomach bleeding or kidney problems in certain people  If you take blood thinner medicine, always ask your healthcare provider if NSAIDs are safe for you  Always read the medicine label and follow directions  Take your medicine as directed  Contact your healthcare provider if you think your medicine is not helping or if you have side effects  Tell him of her if you are allergic to any medicine  Keep a list of the medicines, vitamins, and herbs you take  Include the amounts, and when and why you take them  Bring the list or the pill bottles to follow-up visits  Carry your medicine list with you in case of an emergency  Follow up with your healthcare provider as directed: You may need other tests or treatment  You may also be referred to a physical therapist or other specialist  Write down your questions so you remember to ask them during your visits  Self-care:   Stretch  your muscle to help relieve the cramp  It may be helpful to keep your muscle in the stretched position until the cramp is gone  Apply heat  to help decrease pain and muscle spasms  Apply heat on the area for 20 to 30 minutes every 2 hours for as many days as directed  Apply ice  to help decrease swelling and pain  Ice may also help prevent tissue damage  Use an ice pack, or put crushed ice in a plastic bag   Cover it with a towel and place it on your muscle for 15 to 20 minutes every hour or as directed  Drink more liquids  to help prevent muscle cramps caused by dehydration  Sports drinks may help replace electrolytes you lose through sweat during exercise  Ask your healthcare provider how much liquid to drink each day and which liquids are best for you  Eat healthy foods , such as fruits, vegetables, whole grains, low-fat dairy products, and lean proteins (meat, beans, and fish)  If you are pregnant, ask your healthcare provider about foods that are high in magnesium and sodium  They may help to relieve cramps during pregnancy  Massage your muscle  to help relieve the cramp  Take frequent deep breaths  until the cramp feels better  Lie down while you take the deep breaths so you do not get dizzy or lightheaded  Contact your healthcare provider if:   You have signs of dehydration, such as a headache, dark yellow urine, dry eyes or mouth, or a fast heartbeat  You have questions or concerns about your condition or care  Return to the emergency department if:   You have warmth, swelling, or redness in the cramping muscle  You have frequent or unrelieved muscle cramps in several different muscles  You have muscle cramps with numbness, tingling, and burning in your hands and feet  © Copyright [employer] 2022 Information is for End User's use only and may not be sold, redistributed or otherwise used for commercial purposes  All illustrations and images included in CareNotes® are the copyrighted property of A [employer] M [employer]  or Joe Gongora  The above information is an  only  It is not intended as medical advice for individual conditions or treatments  Talk to your doctor, nurse or pharmacist before following any medical regimen to see if it is safe and effective for you

## 2022-12-19 ENCOUNTER — TELEPHONE (OUTPATIENT)
Dept: INTERNAL MEDICINE CLINIC | Facility: CLINIC | Age: 62
End: 2022-12-19

## 2022-12-19 NOTE — TELEPHONE ENCOUNTER
SPOKE WITH PT, GAVE MESSAGE    SHE SAID THAT IF THE ICE AND TYLENOL DOES NOT HELP, SHE'LL CALL BACK SCHEDULING AN APPT
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
## 2022-12-19 NOTE — TELEPHONE ENCOUNTER
She can try icing this and taking tylenol   If she has questions or would like to discuss this further, please schedule her for a virtual

## 2022-12-23 ENCOUNTER — HOSPITAL ENCOUNTER (OUTPATIENT)
Dept: GASTROENTEROLOGY | Facility: HOSPITAL | Age: 62
Setting detail: OUTPATIENT SURGERY
End: 2022-12-23
Attending: COLON & RECTAL SURGERY

## 2022-12-23 ENCOUNTER — ANESTHESIA EVENT (OUTPATIENT)
Dept: GASTROENTEROLOGY | Facility: HOSPITAL | Age: 62
End: 2022-12-23

## 2022-12-23 ENCOUNTER — ANESTHESIA (OUTPATIENT)
Dept: GASTROENTEROLOGY | Facility: HOSPITAL | Age: 62
End: 2022-12-23

## 2022-12-23 VITALS
WEIGHT: 146 LBS | OXYGEN SATURATION: 98 % | SYSTOLIC BLOOD PRESSURE: 106 MMHG | TEMPERATURE: 96.5 F | HEART RATE: 84 BPM | RESPIRATION RATE: 16 BRPM | DIASTOLIC BLOOD PRESSURE: 52 MMHG | HEIGHT: 64 IN | BODY MASS INDEX: 24.92 KG/M2

## 2022-12-23 DIAGNOSIS — K63.5 POLYP OF COLON, UNSPECIFIED PART OF COLON, UNSPECIFIED TYPE: ICD-10-CM

## 2022-12-23 DIAGNOSIS — Z80.0 FAMILY HISTORY OF COLON CANCER IN FATHER: ICD-10-CM

## 2022-12-23 RX ORDER — PROPOFOL 10 MG/ML
INJECTION, EMULSION INTRAVENOUS AS NEEDED
Status: DISCONTINUED | OUTPATIENT
Start: 2022-12-23 | End: 2022-12-23

## 2022-12-23 RX ORDER — SODIUM CHLORIDE 9 MG/ML
INJECTION, SOLUTION INTRAVENOUS CONTINUOUS PRN
Status: DISCONTINUED | OUTPATIENT
Start: 2022-12-23 | End: 2022-12-23

## 2022-12-23 RX ADMIN — PROPOFOL 50 MG: 10 INJECTION, EMULSION INTRAVENOUS at 10:34

## 2022-12-23 RX ADMIN — SODIUM CHLORIDE: 0.9 INJECTION, SOLUTION INTRAVENOUS at 10:53

## 2022-12-23 RX ADMIN — PROPOFOL 50 MG: 10 INJECTION, EMULSION INTRAVENOUS at 10:38

## 2022-12-23 RX ADMIN — SODIUM CHLORIDE: 0.9 INJECTION, SOLUTION INTRAVENOUS at 10:24

## 2022-12-23 RX ADMIN — PROPOFOL 140 MG: 10 INJECTION, EMULSION INTRAVENOUS at 10:25

## 2022-12-23 RX ADMIN — PROPOFOL 20 MG: 10 INJECTION, EMULSION INTRAVENOUS at 10:30

## 2022-12-23 RX ADMIN — PROPOFOL 40 MG: 10 INJECTION, EMULSION INTRAVENOUS at 10:27

## 2022-12-23 NOTE — ANESTHESIA PREPROCEDURE EVALUATION
Procedure:  COLONOSCOPY    Relevant Problems   ANESTHESIA   (+) PONV (postoperative nausea and vomiting) (2/2 inhalational anesthetics, no problem with TIVA)      CARDIO   (+) Chest pain   (+) Hyperlipidemia      NEURO/PSYCH   (+) Anxiety   (+) History of TIA (transient ischemic attack)   (+) Other headache syndrome      PULMONARY   (+) Asthma   negative stress test 2019 for H/O CP SOB     Physical Exam    Airway    Mallampati score: II  TM Distance: >3 FB  Neck ROM: full     Dental   No notable dental hx     Cardiovascular  Rhythm: regular, Rate: normal,     Pulmonary  Breath sounds clear to auscultation,     Other Findings        Anesthesia Plan  ASA Score- 2     Anesthesia Type- IV sedation with anesthesia with ASA Monitors  Additional Monitors:   Airway Plan:           Plan Factors-Exercise tolerance (METS): >4 METS  Chart reviewed  Patient is not a current smoker  Induction- intravenous  Postoperative Plan-     Informed Consent- Anesthetic plan and risks discussed with patient and spouse  I personally reviewed this patient with the CRNA  Discussed and agreed on the Anesthesia Plan with the CRNA  Milla Jenkins

## 2022-12-23 NOTE — ANESTHESIA POSTPROCEDURE EVALUATION
Post-Op Assessment Note    CV Status:  Stable  Pain Score: 0    Pain management: adequate     Hydration Status:  Stable   PONV Controlled:  None   Airway Patency:  Patent      Post Op Vitals Reviewed: Yes      Staff: CRNA         No notable events documented      BP   90/57   Temp 96 5F   Pulse 82   Resp 16   SpO2 96% RA

## 2022-12-23 NOTE — H&P
History and Physical   Colon and Rectal Surgery   Cruz Calderón 60 y o  female MRN: [id number]  Unit/Bed#:  Encounter: [id number]  12/23/22   9:42 AM      No chief complaint on file  ASSESSMENT:  Cruz Calderón is a 60 y o  female who presents for screening colonoscopy, last 2017, history of adenomatous polyps  Screening colonoscopy,discussed in a face-to-face, personal, informed consent process, the benefits, alternatives, risks including not limited to bleeding, missed lesion, perforation requiring emergent surgery discussed/understood  PLAN:  Colonoscopy    History of Present Illness   HPI:  Cruz Calderón is a 60 y o  female who presents for colonoscopy  Denies nausea/vomiting/abdominal pain, change in bowel habits, rectal bleeding, or other constitutional symptoms  Historical Information   Past Medical History:   Diagnosis Date   • Abnormal cervical Papanicolaou smear    • Acne     LAST ASSESSED: 1/17/15   • Asthma    • BRCA1 negative    • Change in bowel habit    • Family hx of colon cancer requiring screening colonoscopy     father colon cancer/ maternal grandfather colon cancer   • Long term use of drug     LAST ASSESSED: 10/3/13   • Migraine    • PONV (postoperative nausea and vomiting)    • Retinal tear    • Thyroid nodule 2020   • TIA (transient ischemic attack)    • 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     LAST ASSESSED: 3/9/13   • Vascular headache      Past Surgical History:   Procedure Laterality Date   • APPENDECTOMY     • BREAST EXCISIONAL BIOPSY Left 2006    benign   • COLONOSCOPY     • DILATION AND CURETTAGE OF UTERUS     • INCISIONAL BREAST BIOPSY     • NM COLONOSCOPY FLX DX W/COLLJ SPEC WHEN PFRMD N/A 07/17/2017    Procedure: COLONOSCOPY;  Surgeon: Mary Kate Mendes MD;  Location: [facility];   Service: Colorectal   • NM CYSTOURETHROSCOPY N/A 6/20/2022    Procedure: CYSTOSCOPY;  Surgeon: Farzad Castaneda MD;  Location: [facility];  Service: UroGynecology          • NM POST COLPORRHAPHY,RECTUM/VAGINA N/A 6/20/2022    Procedure: POSTERIOR COLPORRHAPHY,  I&D OF ICLUSION LABIAL CYST;  Surgeon: Ahmad Bamberger, MD;  Location: [facility];  Service: UroGynecology          • MD SLING OPER STRES INCONTINENCE N/A 6/20/2022    Procedure: Cristina Hines;  Surgeon: Ahmad Bamberger, MD;  Location: [facility];  Service: UroGynecology          • RETINOPATHY SURGERY      retinal tear repair x3       Meds/Allergies     (Not in a hospital admission)        Current Outpatient Medications:   •  acetaminophen (TYLENOL) 650 mg CR tablet, Take 2 tablets (1,300 mg total) by mouth every 8 (eight) hours as needed for mild pain, Disp: 30 tablet, Rfl: 0  •  albuterol (ProAir HFA) 90 mcg/act inhaler, Inhale 2 puffs every 6 (six) hours as needed for wheezing or shortness of breath, Disp: 8 5 g, Rfl: 0  •  aspirin 81 mg chewable tablet, Chew 1 tablet (81 mg total) daily, Disp: , Rfl: 0  •  cholecalciferol (VITAMIN D3) 1,000 units tablet, Take 1,000 Units by mouth daily, Disp: , Rfl:   •  doxycycline hyclate (VIBRAMYCIN) 50 mg capsule, 50 mg as needed , Disp: , Rfl:   •  spironolactone (ALDACTONE) 50 mg tablet, Take 2 tabs bid daily, Disp: , Rfl:   •  clindamycin (CLEOCIN T) 1 %, 1 pad 2 (two) times a day , Disp: , Rfl:   •  docusate sodium (COLACE) 100 mg capsule, Take 1 capsule (100 mg total) by mouth 2 (two) times a day, Disp: , Rfl: 0  •  ibuprofen (MOTRIN) 800 mg tablet, Take 1 tablet (800 mg total) by mouth every 6 (six) hours as needed for mild pain or moderate pain, Disp: 30 tablet, Rfl: 0  •  LORazepam (Ativan) 0 5 mg tablet, Take 1 tablet (0 5 mg total) by mouth every 8 (eight) hours as needed for anxiety (AS NEEDED FOR DENTAL WORK), Disp: 20 tablet, Rfl: 0    Current Facility-Administered Medications:   •  cyclobenzaprine (FLEXERIL) tablet 10 mg, 10 mg, Oral, TID PRN, Bettye Serna PA-C    Allergies   Allergen Reactions   • Other Nausea Only and Vomiting     anesthesia   • Azithromycin Other (See Comments)     Pt doesn't remember    • Latex      Added based on information entered during case entry, please review and add reactions, type, and severity as needed   • Penicillins Rash         Social History   Social History     Substance and Sexual Activity   Alcohol Use Yes   • Alcohol/week: 1 0 standard drink   • Types: 1 Glasses of wine per week    Comment: social     Social History     Substance and Sexual Activity   Drug Use No     Social History     Tobacco Use   Smoking Status Never   Smokeless Tobacco Never         Family History:   Family History   Problem Relation Age of Onset   • Breast cancer Mother 68   • Colon cancer Father 61   • Aneurysm Sister         brain   • Breast cancer Maternal Grandmother 52   • Coronary artery disease Family    • Breast cancer Family    • Colon cancer Family    • Hyperlipidemia Family    • Emphysema Family         INTERSTITIAL   • Osteoarthritis Family    • Breast cancer Maternal Aunt 42   • Colon cancer Maternal Grandfather 62   • No Known Problems Paternal Grandmother    • No Known Problems Paternal Grandfather    • Breast cancer Cousin 36   • No Known Problems Daughter    • No Known Problems Sister    • No Known Problems Son    • No Known Problems Maternal Aunt          Objective     Current Vitals:   Height: 5' 4" (162 6 cm) (12/23/22 0939)  Weight - Scale: 66 2 kg (146 lb) (12/23/22 0939)  No intake or output data in the 24 hours ending 12/23/22 0942    Physical Exam:  General:no distress  Eyes:perrla/eomi  ENT:moist mucus membranes  Neck:supple  Pulm:no increased work of breathing  CV:sinus  Abdomen:soft,nontender

## 2023-01-05 DIAGNOSIS — S46.819A STRAIN OF TRAPEZIUS MUSCLE, UNSPECIFIED LATERALITY, INITIAL ENCOUNTER: ICD-10-CM

## 2023-01-05 DIAGNOSIS — S49.91XA RIGHT SHOULDER INJURY, INITIAL ENCOUNTER: ICD-10-CM

## 2023-01-05 RX ORDER — IBUPROFEN 800 MG/1
800 TABLET ORAL EVERY 6 HOURS PRN
Qty: 90 TABLET | Refills: 0 | Status: SHIPPED | OUTPATIENT
Start: 2023-01-05

## 2023-01-11 ENCOUNTER — TELEPHONE (OUTPATIENT)
Dept: INTERNAL MEDICINE CLINIC | Facility: CLINIC | Age: 63
End: 2023-01-11

## 2023-01-11 ENCOUNTER — APPOINTMENT (OUTPATIENT)
Dept: LAB | Age: 63
End: 2023-01-11

## 2023-01-11 DIAGNOSIS — N30.90 CYSTITIS: Primary | ICD-10-CM

## 2023-01-11 DIAGNOSIS — N30.90 DIVERTICULITIS OF BLADDER: Primary | ICD-10-CM

## 2023-01-11 LAB
BACTERIA UR QL AUTO: ABNORMAL /HPF
BILIRUB UR QL STRIP: NEGATIVE
CLARITY UR: CLEAR
COLOR UR: ABNORMAL
GLUCOSE UR STRIP-MCNC: NEGATIVE MG/DL
HGB UR QL STRIP.AUTO: ABNORMAL
KETONES UR STRIP-MCNC: NEGATIVE MG/DL
LEUKOCYTE ESTERASE UR QL STRIP: ABNORMAL
NITRITE UR QL STRIP: NEGATIVE
NON-SQ EPI CELLS URNS QL MICRO: ABNORMAL /HPF
PH UR STRIP.AUTO: 6.5 [PH]
PROT UR STRIP-MCNC: NEGATIVE MG/DL
RBC #/AREA URNS AUTO: ABNORMAL /HPF
SP GR UR STRIP.AUTO: 1.01 (ref 1–1.03)
UROBILINOGEN UR STRIP-ACNC: <2 MG/DL
WBC #/AREA URNS AUTO: ABNORMAL /HPF

## 2023-01-11 RX ORDER — NITROFURANTOIN 25; 75 MG/1; MG/1
100 CAPSULE ORAL 2 TIMES DAILY
Qty: 10 CAPSULE | Refills: 0 | Status: SHIPPED | OUTPATIENT
Start: 2023-01-11 | End: 2023-01-16
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
## 2023-01-12 ENCOUNTER — OFFICE VISIT (OUTPATIENT)
Dept: INTERNAL MEDICINE CLINIC | Facility: CLINIC | Age: 63
End: 2023-01-12

## 2023-01-12 VITALS
BODY MASS INDEX: 24.92 KG/M2 | DIASTOLIC BLOOD PRESSURE: 61 MMHG | RESPIRATION RATE: 15 BRPM | HEIGHT: 64 IN | WEIGHT: 146 LBS | OXYGEN SATURATION: 99 % | HEART RATE: 73 BPM | SYSTOLIC BLOOD PRESSURE: 124 MMHG

## 2023-01-12 DIAGNOSIS — N30.00 ACUTE CYSTITIS WITHOUT HEMATURIA: ICD-10-CM

## 2023-01-12 DIAGNOSIS — Z04.1 ENCOUNTER FOR EXAMINATION FOLLOWING MOTOR VEHICLE COLLISION (MVC): ICD-10-CM

## 2023-01-12 DIAGNOSIS — M48.02 CERVICAL STENOSIS OF SPINAL CANAL: Primary | ICD-10-CM

## 2023-01-12 RX ORDER — CONJUGATED ESTROGENS 0.62 MG/G
CREAM VAGINAL
COMMUNITY
Start: 2022-12-12
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
## 2023-01-14 LAB — BACTERIA UR CULT: ABNORMAL

## 2023-01-19 DIAGNOSIS — M48.02 CERVICAL STENOSIS OF SPINAL CANAL: Primary | ICD-10-CM

## 2023-01-20 ENCOUNTER — APPOINTMENT (OUTPATIENT)
Dept: RADIOLOGY | Age: 63
End: 2023-01-20

## 2023-01-20 ENCOUNTER — TELEPHONE (OUTPATIENT)
Dept: INTERNAL MEDICINE CLINIC | Facility: CLINIC | Age: 63
End: 2023-01-20

## 2023-01-20 DIAGNOSIS — M48.02 CERVICAL STENOSIS OF SPINAL CANAL: ICD-10-CM

## 2023-01-20 DIAGNOSIS — M79.641 RIGHT HAND PAIN: ICD-10-CM

## 2023-01-20 DIAGNOSIS — M79.641 RIGHT HAND PAIN: Primary | ICD-10-CM

## 2023-01-20 NOTE — TELEPHONE ENCOUNTER
Patient asked if you can add a xray of her right hand too please    Also gave her the message she is aware

## 2023-01-20 NOTE — TELEPHONE ENCOUNTER
DO Ta Ramos; Angie Vogt MA  Hi Kennedi, please let patient know her MRI was not covered by her insurance  She will need to obtain xrays and complete PT for 6 weeks  Referrals for PT and xray of her neck are ordered

## 2023-01-24 ENCOUNTER — HOSPITAL ENCOUNTER (OUTPATIENT)
Dept: RADIOLOGY | Age: 63
Discharge: HOME | End: 2023-01-24

## 2023-01-24 DIAGNOSIS — M48.02 CERVICAL STENOSIS OF SPINAL CANAL: ICD-10-CM

## 2023-01-24 DIAGNOSIS — Z04.1 ENCOUNTER FOR EXAMINATION FOLLOWING MOTOR VEHICLE COLLISION (MVC): ICD-10-CM

## 2023-01-25 DIAGNOSIS — M54.12 CERVICAL RADICULOPATHY: Primary | ICD-10-CM

## 2023-02-08 ENCOUNTER — TELEPHONE (OUTPATIENT)
Dept: INTERNAL MEDICINE CLINIC | Facility: CLINIC | Age: 63
End: 2023-02-08

## 2023-02-08 DIAGNOSIS — Z13.1 SCREENING FOR DIABETES MELLITUS: Primary | ICD-10-CM

## 2023-02-08 NOTE — TELEPHONE ENCOUNTER
PT CALLED, ASKED IF YOU COULD ADD ON AN A1C FOR HER CARING STARTS WITH YOU SO SHE CAN HAVE IT DONE IN MARCH BEFORE SHE COMES IN NEXT

## 2023-02-10 ENCOUNTER — ESTABLISHED COMPREHENSIVE EXAM (OUTPATIENT)
Dept: URBAN - METROPOLITAN AREA CLINIC 6 | Facility: CLINIC | Age: 63
End: 2023-02-10

## 2023-02-10 DIAGNOSIS — H25.813: ICD-10-CM

## 2023-02-10 DIAGNOSIS — H35.372: ICD-10-CM

## 2023-02-10 DIAGNOSIS — H33.312: ICD-10-CM

## 2023-02-10 PROCEDURE — 92014 COMPRE OPH EXAM EST PT 1/>: CPT

## 2023-02-10 ASSESSMENT — TONOMETRY
OD_IOP_MMHG: 19
OS_IOP_MMHG: 23

## 2023-02-10 ASSESSMENT — VISUAL ACUITY
OS_SC: 20/25-2
OD_SC: 20/30-1

## 2023-03-10 ENCOUNTER — ANNUAL EXAM (OUTPATIENT)
Dept: OBGYN CLINIC | Facility: CLINIC | Age: 63
End: 2023-03-10

## 2023-03-10 VITALS
DIASTOLIC BLOOD PRESSURE: 80 MMHG | SYSTOLIC BLOOD PRESSURE: 120 MMHG | HEIGHT: 64 IN | BODY MASS INDEX: 25.1 KG/M2 | WEIGHT: 147 LBS

## 2023-03-10 DIAGNOSIS — Z12.31 ENCOUNTER FOR SCREENING MAMMOGRAM FOR MALIGNANT NEOPLASM OF BREAST: ICD-10-CM

## 2023-03-10 DIAGNOSIS — Z01.419 PAP SMEAR, AS PART OF ROUTINE GYNECOLOGICAL EXAMINATION: ICD-10-CM

## 2023-03-10 DIAGNOSIS — Z01.419 ENCOUNTER FOR GYNECOLOGICAL EXAMINATION WITHOUT ABNORMAL FINDING: Primary | ICD-10-CM

## 2023-03-10 DIAGNOSIS — N90.89 VULVAR IRRITATION: ICD-10-CM

## 2023-03-10 DIAGNOSIS — N95.2 ATROPHIC VAGINITIS: ICD-10-CM

## 2023-03-10 RX ORDER — TRIAMCINOLONE ACETONIDE 0.25 MG/G
CREAM TOPICAL
Qty: 30 G | Refills: 1 | Status: SHIPPED | OUTPATIENT
Start: 2023-03-10

## 2023-03-10 NOTE — PROGRESS NOTES
Assessment/Plan:    No problem-specific Assessment & Plan notes found for this encounter  Diagnoses and all orders for this visit:    Encounter for gynecological examination without abnormal finding  -     Liquid-based pap, screening    Pap smear, as part of routine gynecological examination    Encounter for screening mammogram for malignant neoplasm of breast  -     Mammo screening bilateral w 3d & cad; Future    Atrophic vaginitis          Normal gynecological physical examination  Self-breast examination stressed  Mammogram ordered  Discussed regular exercise, healthy diet, importance of vitamin D and calcium supplements  Discussed importance of sun block use during periods of prolonged sun exposure  Patient will be seen in 1 year for routine gynecologic and medical examination  Patient will call office for any problems, concerns, or issues which may arise during the interim  Subjective:          HPI    Patient ID: Chris Briceno is a 58 y o  female who presents today for her annual gynecologic and medical examination    Menstrual status: Patient is postmenopausal and denies any vaginal bleeding at this time  Vasomotor symptoms: Patient denies any significant vasomotor symptoms  However she reports some vulvar dryness and irritation at times  We will prescribe Kenalog cream to apply sparingly if needed for relief      Patient reports normal appetite    Patient reports normal bowel and bladder habits    Patient denies any significant pelvic or abdominal pain    Patient denies any headaches, chest pain, shortness of breath fever shakes or chills    Patient denies any COVID 19 symptoms including cough or loss of taste or smell    COVID vaccine status: Patient up-to-date with COVID vaccination    Medical problems: Patient not being followed for any significant medical problems at this time    Colonoscopy status: Patient up-to-date with screening colonoscopy    Mammogram status: Patient does regular self breast examinations and is up-to-date with screening mammography as well  Appropriate arrangements for her annual screening mammogram were placed into the EMR system at today's visit  The following portions of the patient's history were reviewed and updated as appropriate: allergies, current medications, past family history, past medical history, past social history, past surgical history and problem list     Review of Systems   Constitutional: Negative  Negative for appetite change, diaphoresis, fatigue, fever and unexpected weight change  HENT: Negative  Eyes: Negative  Respiratory: Negative  Cardiovascular: Negative  Gastrointestinal: Negative  Negative for abdominal pain, blood in stool, constipation, diarrhea, nausea and vomiting  Endocrine: Negative  Negative for cold intolerance and heat intolerance  Genitourinary: Negative  Negative for dysuria, frequency, hematuria, urgency, vaginal bleeding, vaginal discharge and vaginal pain  Musculoskeletal: Negative  Skin: Negative  Allergic/Immunologic: Negative  Neurological: Negative  Hematological: Negative  Negative for adenopathy  Psychiatric/Behavioral: Negative  Objective:      /80   Ht 5' 4" (1 626 m)   Wt 66 7 kg (147 lb)   BMI 25 23 kg/m²          Physical Exam  Constitutional:       General: She is not in acute distress  Appearance: Normal appearance  She is well-developed  She is not diaphoretic  HENT:      Head: Normocephalic and atraumatic  Eyes:      Pupils: Pupils are equal, round, and reactive to light  Cardiovascular:      Rate and Rhythm: Normal rate and regular rhythm  Heart sounds: Normal heart sounds  No murmur heard  No friction rub  No gallop  Pulmonary:      Effort: Pulmonary effort is normal       Breath sounds: Normal breath sounds     Chest:   Breasts:     Breasts are symmetrical       Right: No inverted nipple, mass, nipple discharge, skin change or tenderness  Left: No inverted nipple, mass, nipple discharge, skin change or tenderness  Abdominal:      General: Bowel sounds are normal       Palpations: Abdomen is soft  Genitourinary:     General: Normal vulva  Exam position: Supine  Labia:         Right: No rash or lesion  Left: No rash or lesion  Urethra: No urethral swelling or urethral lesion  Vagina: Normal  No vaginal discharge, erythema, tenderness or bleeding  Cervix: No discharge or friability  Uterus: Not enlarged and not tender  Adnexa:         Right: No mass, tenderness or fullness  Left: No mass, tenderness or fullness  Rectum: Normal  Guaiac result negative  Comments: Pelvic exam reveals minimal atrophic vaginitis  Good pelvic support confirmed  Hemorrhoids noted on exam  Use small slim speculum  Musculoskeletal:         General: Normal range of motion  Cervical back: Normal range of motion and neck supple  Lymphadenopathy:      Cervical: No cervical adenopathy  Upper Body:      Right upper body: No supraclavicular adenopathy  Left upper body: No supraclavicular adenopathy  Skin:     General: Skin is warm and dry  Findings: No rash  Neurological:      General: No focal deficit present  Mental Status: She is alert and oriented to person, place, and time  Psychiatric:         Mood and Affect: Mood normal          Speech: Speech normal          Behavior: Behavior normal          Thought Content:  Thought content normal          Judgment: Judgment normal

## 2023-03-16 ENCOUNTER — APPOINTMENT (OUTPATIENT)
Dept: LAB | Facility: CLINIC | Age: 63
End: 2023-03-16

## 2023-03-16 DIAGNOSIS — Z00.8 HEALTH EXAMINATION IN POPULATION SURVEY: ICD-10-CM

## 2023-03-16 DIAGNOSIS — Z13.1 SCREENING FOR DIABETES MELLITUS: ICD-10-CM

## 2023-03-16 DIAGNOSIS — E78.5 HYPERLIPIDEMIA, UNSPECIFIED HYPERLIPIDEMIA TYPE: ICD-10-CM

## 2023-03-16 DIAGNOSIS — E55.9 VITAMIN D DEFICIENCY: ICD-10-CM

## 2023-03-16 DIAGNOSIS — E04.1 THYROID NODULE: ICD-10-CM

## 2023-03-16 LAB
ALBUMIN SERPL BCP-MCNC: 4.2 G/DL (ref 3.5–5)
ALP SERPL-CCNC: 65 U/L (ref 46–116)
ALT SERPL W P-5'-P-CCNC: 24 U/L (ref 12–78)
ANION GAP SERPL CALCULATED.3IONS-SCNC: 3 MMOL/L (ref 4–13)
AST SERPL W P-5'-P-CCNC: 18 U/L (ref 5–45)
BASOPHILS # BLD AUTO: 0.04 THOUSANDS/ÂΜL (ref 0–0.1)
BASOPHILS NFR BLD AUTO: 1 % (ref 0–1)
BILIRUB SERPL-MCNC: 0.39 MG/DL (ref 0.2–1)
BUN SERPL-MCNC: 12 MG/DL (ref 5–25)
CALCIUM SERPL-MCNC: 9.7 MG/DL (ref 8.3–10.1)
CHLORIDE SERPL-SCNC: 109 MMOL/L (ref 96–108)
CHOLEST SERPL-MCNC: 175 MG/DL
CO2 SERPL-SCNC: 27 MMOL/L (ref 21–32)
CREAT SERPL-MCNC: 0.72 MG/DL (ref 0.6–1.3)
EOSINOPHIL # BLD AUTO: 0.14 THOUSAND/ÂΜL (ref 0–0.61)
EOSINOPHIL NFR BLD AUTO: 3 % (ref 0–6)
ERYTHROCYTE [DISTWIDTH] IN BLOOD BY AUTOMATED COUNT: 12.6 % (ref 11.6–15.1)
GFR SERPL CREATININE-BSD FRML MDRD: 88 ML/MIN/1.73SQ M
GLUCOSE P FAST SERPL-MCNC: 79 MG/DL (ref 65–99)
HCT VFR BLD AUTO: 44 % (ref 34.8–46.1)
HDLC SERPL-MCNC: 80 MG/DL
HGB BLD-MCNC: 13.8 G/DL (ref 11.5–15.4)
IMM GRANULOCYTES # BLD AUTO: 0.01 THOUSAND/UL (ref 0–0.2)
IMM GRANULOCYTES NFR BLD AUTO: 0 % (ref 0–2)
LDLC SERPL CALC-MCNC: 80 MG/DL (ref 0–100)
LYMPHOCYTES # BLD AUTO: 1.71 THOUSANDS/ÂΜL (ref 0.6–4.47)
LYMPHOCYTES NFR BLD AUTO: 37 % (ref 14–44)
MCH RBC QN AUTO: 29.8 PG (ref 26.8–34.3)
MCHC RBC AUTO-ENTMCNC: 31.4 G/DL (ref 31.4–37.4)
MCV RBC AUTO: 95 FL (ref 82–98)
MONOCYTES # BLD AUTO: 0.46 THOUSAND/ÂΜL (ref 0.17–1.22)
MONOCYTES NFR BLD AUTO: 10 % (ref 4–12)
NEUTROPHILS # BLD AUTO: 2.28 THOUSANDS/ÂΜL (ref 1.85–7.62)
NEUTS SEG NFR BLD AUTO: 49 % (ref 43–75)
NRBC BLD AUTO-RTO: 0 /100 WBCS
PLATELET # BLD AUTO: 248 THOUSANDS/UL (ref 149–390)
PMV BLD AUTO: 12.1 FL (ref 8.9–12.7)
POTASSIUM SERPL-SCNC: 4.8 MMOL/L (ref 3.5–5.3)
PROT SERPL-MCNC: 7.2 G/DL (ref 6.4–8.4)
RBC # BLD AUTO: 4.63 MILLION/UL (ref 3.81–5.12)
SODIUM SERPL-SCNC: 139 MMOL/L (ref 135–147)
TRIGL SERPL-MCNC: 73 MG/DL
WBC # BLD AUTO: 4.64 THOUSAND/UL (ref 4.31–10.16)

## 2023-03-17 LAB
EST. AVERAGE GLUCOSE BLD GHB EST-MCNC: 103 MG/DL
HBA1C MFR BLD: 5.2 %

## 2023-03-20 LAB
LAB AP GYN PRIMARY INTERPRETATION: NORMAL
Lab: NORMAL

## 2023-03-27 ENCOUNTER — OFFICE VISIT (OUTPATIENT)
Dept: INTERNAL MEDICINE CLINIC | Facility: CLINIC | Age: 63
End: 2023-03-27

## 2023-03-27 VITALS
OXYGEN SATURATION: 98 % | RESPIRATION RATE: 16 BRPM | HEART RATE: 93 BPM | BODY MASS INDEX: 25.78 KG/M2 | TEMPERATURE: 95.8 F | WEIGHT: 151 LBS | SYSTOLIC BLOOD PRESSURE: 100 MMHG | HEIGHT: 64 IN | DIASTOLIC BLOOD PRESSURE: 70 MMHG

## 2023-03-27 DIAGNOSIS — M48.02 CERVICAL STENOSIS OF SPINAL CANAL: Primary | ICD-10-CM

## 2023-03-27 DIAGNOSIS — E04.1 THYROID NODULE: ICD-10-CM

## 2023-03-27 DIAGNOSIS — E55.9 VITAMIN D DEFICIENCY: ICD-10-CM

## 2023-03-27 DIAGNOSIS — E78.5 HYPERLIPIDEMIA, UNSPECIFIED HYPERLIPIDEMIA TYPE: ICD-10-CM
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
## 2023-06-13 DIAGNOSIS — N95.2 ATROPHIC VAGINITIS: Primary | ICD-10-CM

## 2023-06-13 RX ORDER — ESTRADIOL 0.1 MG/G
CREAM VAGINAL
Qty: 42.5 G | Refills: 3 | Status: SHIPPED | OUTPATIENT
Start: 2023-06-13

## 2023-06-14 ENCOUNTER — HOSPITAL ENCOUNTER (OUTPATIENT)
Dept: RADIOLOGY | Age: 63
Discharge: HOME | End: 2023-06-14
Payer: COMMERCIAL

## 2023-06-14 DIAGNOSIS — Z12.31 ENCOUNTER FOR SCREENING MAMMOGRAM FOR MALIGNANT NEOPLASM OF BREAST: ICD-10-CM

## 2023-06-14 PROCEDURE — 77063 BREAST TOMOSYNTHESIS BI: CPT

## 2023-06-14 PROCEDURE — 77067 SCR MAMMO BI INCL CAD: CPT

## 2023-06-15 ENCOUNTER — TELEPHONE (OUTPATIENT)
Dept: OBGYN CLINIC | Facility: CLINIC | Age: 63
End: 2023-06-15

## 2023-06-15 NOTE — TELEPHONE ENCOUNTER
Patient calling regarding Mammogram results  Results dropped in [place] and she has some concerns and questions       # [phone]

## 2023-06-16 ENCOUNTER — HOSPITAL ENCOUNTER (OUTPATIENT)
Dept: MAMMOGRAPHY | Facility: CLINIC | Age: 63
Discharge: HOME | End: 2023-06-16
Payer: COMMERCIAL

## 2023-06-16 ENCOUNTER — HOSPITAL ENCOUNTER (OUTPATIENT)
Dept: ULTRASOUND IMAGING | Facility: CLINIC | Age: 63
Discharge: HOME | End: 2023-06-16
Payer: COMMERCIAL

## 2023-06-16 VITALS — BODY MASS INDEX: 24.92 KG/M2 | HEIGHT: 64 IN | WEIGHT: 146 LBS

## 2023-06-16 DIAGNOSIS — R92.8 ABNORMAL SCREENING MAMMOGRAM: ICD-10-CM

## 2023-06-16 PROCEDURE — 76642 ULTRASOUND BREAST LIMITED: CPT

## 2023-06-16 PROCEDURE — G0279 TOMOSYNTHESIS, MAMMO: HCPCS

## 2023-06-16 PROCEDURE — 77065 DX MAMMO INCL CAD UNI: CPT

## 2023-06-19 NOTE — TELEPHONE ENCOUNTER
Spoke to patient regarding her diagnostic results done on Friday, June 16, 2023    Reassured her that the represented a benign cyst and inflammatory no    She is good for follow-up in 1 year

## 2023-09-05 ENCOUNTER — TELEPHONE (OUTPATIENT)
Dept: FAMILY MEDICINE CLINIC | Facility: CLINIC | Age: 63
End: 2023-09-05

## 2023-09-05 DIAGNOSIS — E61.1 IRON DEFICIENCY: Primary | ICD-10-CM

## 2023-09-05 NOTE — TELEPHONE ENCOUNTER
patient asked if an iron level test can be ordered because she has been feeling tired and sluggish lateley

## 2023-09-05 NOTE — TELEPHONE ENCOUNTER
Iron is now ordered along with the rest of her labs, she can fast for 8 hours and then obtain the labs

## 2023-09-20 ENCOUNTER — APPOINTMENT (OUTPATIENT)
Dept: LAB | Facility: MEDICAL CENTER | Age: 63
End: 2023-09-20
Payer: COMMERCIAL

## 2023-09-20 DIAGNOSIS — E78.5 HYPERLIPIDEMIA, UNSPECIFIED HYPERLIPIDEMIA TYPE: ICD-10-CM

## 2023-09-20 DIAGNOSIS — E04.1 THYROID NODULE: ICD-10-CM

## 2023-09-20 DIAGNOSIS — E61.1 IRON DEFICIENCY: ICD-10-CM

## 2023-09-20 DIAGNOSIS — M48.02 CERVICAL STENOSIS OF SPINAL CANAL: ICD-10-CM

## 2023-09-20 DIAGNOSIS — E55.9 VITAMIN D DEFICIENCY: ICD-10-CM

## 2023-09-20 LAB
25(OH)D3 SERPL-MCNC: 51.8 NG/ML (ref 30–100)
ALBUMIN SERPL BCP-MCNC: 4.2 G/DL (ref 3.5–5)
ALP SERPL-CCNC: 59 U/L (ref 34–104)
ALT SERPL W P-5'-P-CCNC: 11 U/L (ref 7–52)
ANION GAP SERPL CALCULATED.3IONS-SCNC: 4 MMOL/L
AST SERPL W P-5'-P-CCNC: 15 U/L (ref 13–39)
BASOPHILS # BLD AUTO: 0.03 THOUSANDS/ÂΜL (ref 0–0.1)
BASOPHILS NFR BLD AUTO: 1 % (ref 0–1)
BILIRUB SERPL-MCNC: 0.48 MG/DL (ref 0.2–1)
BUN SERPL-MCNC: 17 MG/DL (ref 5–25)
CALCIUM SERPL-MCNC: 9.2 MG/DL (ref 8.4–10.2)
CHLORIDE SERPL-SCNC: 103 MMOL/L (ref 96–108)
CHOLEST SERPL-MCNC: 179 MG/DL
CO2 SERPL-SCNC: 29 MMOL/L (ref 21–32)
CREAT SERPL-MCNC: 0.74 MG/DL (ref 0.6–1.3)
EOSINOPHIL # BLD AUTO: 0.13 THOUSAND/ÂΜL (ref 0–0.61)
EOSINOPHIL NFR BLD AUTO: 2 % (ref 0–6)
ERYTHROCYTE [DISTWIDTH] IN BLOOD BY AUTOMATED COUNT: 12.9 % (ref 11.6–15.1)
GFR SERPL CREATININE-BSD FRML MDRD: 85 ML/MIN/1.73SQ M
GLUCOSE P FAST SERPL-MCNC: 81 MG/DL (ref 65–99)
HCT VFR BLD AUTO: 43.8 % (ref 34.8–46.1)
HDLC SERPL-MCNC: 67 MG/DL
HGB BLD-MCNC: 14.1 G/DL (ref 11.5–15.4)
IMM GRANULOCYTES # BLD AUTO: 0.01 THOUSAND/UL (ref 0–0.2)
IMM GRANULOCYTES NFR BLD AUTO: 0 % (ref 0–2)
LDLC SERPL CALC-MCNC: 97 MG/DL (ref 0–100)
LYMPHOCYTES # BLD AUTO: 1.74 THOUSANDS/ÂΜL (ref 0.6–4.47)
LYMPHOCYTES NFR BLD AUTO: 28 % (ref 14–44)
MCH RBC QN AUTO: 30.5 PG (ref 26.8–34.3)
MCHC RBC AUTO-ENTMCNC: 32.2 G/DL (ref 31.4–37.4)
MCV RBC AUTO: 95 FL (ref 82–98)
MONOCYTES # BLD AUTO: 0.58 THOUSAND/ÂΜL (ref 0.17–1.22)
MONOCYTES NFR BLD AUTO: 9 % (ref 4–12)
NEUTROPHILS # BLD AUTO: 3.7 THOUSANDS/ÂΜL (ref 1.85–7.62)
NEUTS SEG NFR BLD AUTO: 60 % (ref 43–75)
NRBC BLD AUTO-RTO: 0 /100 WBCS
PLATELET # BLD AUTO: 251 THOUSANDS/UL (ref 149–390)
PMV BLD AUTO: 11.7 FL (ref 8.9–12.7)
POTASSIUM SERPL-SCNC: 4.8 MMOL/L (ref 3.5–5.3)
PROT SERPL-MCNC: 6.7 G/DL (ref 6.4–8.4)
RBC # BLD AUTO: 4.63 MILLION/UL (ref 3.81–5.12)
SODIUM SERPL-SCNC: 136 MMOL/L (ref 135–147)
TRIGL SERPL-MCNC: 77 MG/DL
TSH SERPL DL<=0.05 MIU/L-ACNC: 1.7 UIU/ML (ref 0.45–4.5)
WBC # BLD AUTO: 6.19 THOUSAND/UL (ref 4.31–10.16)

## 2023-09-20 PROCEDURE — 36415 COLL VENOUS BLD VENIPUNCTURE: CPT

## 2023-09-20 PROCEDURE — 85025 COMPLETE CBC W/AUTO DIFF WBC: CPT

## 2023-09-20 PROCEDURE — 80061 LIPID PANEL: CPT

## 2023-09-20 PROCEDURE — 84443 ASSAY THYROID STIM HORMONE: CPT

## 2023-09-20 PROCEDURE — 80053 COMPREHEN METABOLIC PANEL: CPT

## 2023-09-20 PROCEDURE — 82306 VITAMIN D 25 HYDROXY: CPT

## 2023-09-22 ENCOUNTER — TELEPHONE (OUTPATIENT)
Dept: INTERNAL MEDICINE CLINIC | Facility: CLINIC | Age: 63
End: 2023-09-22

## 2023-09-22 DIAGNOSIS — E61.1 IRON DEFICIENCY: Primary | ICD-10-CM

## 2023-09-22 NOTE — TELEPHONE ENCOUNTER
Patient left message  It looks like it was never drawn       Hi, this is [employer]. I had blood work on Wednesday at that I needed for Doctor Nelson Waddell and I don't see the iron results that he had ordered. Can you tell me if you got the results because nothing's in my chart. I'm at [phone]. My date of birth is [birth year]. Thank you.  Ben.

## 2023-09-22 NOTE — TELEPHONE ENCOUNTER
The iron level does not appear to have been collected even though it was ordered, please check with the lab if they can add it on

## 2023-09-22 NOTE — TELEPHONE ENCOUNTER
Called the lab ,the excepted date was for next year so that's why it could not be drawn. I ordered it with the correct date and left the one for 2024.   They lab said they will add it on      Patient is aware

## 2023-09-23 ENCOUNTER — APPOINTMENT (OUTPATIENT)
Dept: LAB | Facility: MEDICAL CENTER | Age: 63
End: 2023-09-23
Payer: COMMERCIAL

## 2023-09-23 DIAGNOSIS — E61.1 IRON DEFICIENCY: ICD-10-CM

## 2023-09-23 DIAGNOSIS — E04.1 THYROID NODULE: ICD-10-CM

## 2023-09-23 LAB
FERRITIN SERPL-MCNC: 59 NG/ML (ref 11–307)
IRON SATN MFR SERPL: 41 % (ref 15–50)
IRON SERPL-MCNC: 146 UG/DL (ref 50–212)
TIBC SERPL-MCNC: 355 UG/DL (ref 250–450)
UIBC SERPL-MCNC: 209 UG/DL (ref 155–355)

## 2023-09-23 PROCEDURE — 36415 COLL VENOUS BLD VENIPUNCTURE: CPT

## 2023-09-23 PROCEDURE — 83540 ASSAY OF IRON: CPT

## 2023-09-23 PROCEDURE — 82728 ASSAY OF FERRITIN: CPT

## 2023-09-23 PROCEDURE — 83550 IRON BINDING TEST: CPT

## 2023-10-02 ENCOUNTER — OFFICE VISIT (OUTPATIENT)
Dept: INTERNAL MEDICINE CLINIC | Facility: CLINIC | Age: 63
End: 2023-10-02
Payer: COMMERCIAL

## 2023-10-02 VITALS
OXYGEN SATURATION: 98 % | HEIGHT: 64 IN | BODY MASS INDEX: 25.39 KG/M2 | SYSTOLIC BLOOD PRESSURE: 111 MMHG | WEIGHT: 148.7 LBS | TEMPERATURE: 98.5 F | DIASTOLIC BLOOD PRESSURE: 56 MMHG | HEART RATE: 78 BPM

## 2023-10-02 DIAGNOSIS — J01.00 ACUTE NON-RECURRENT MAXILLARY SINUSITIS: Primary | ICD-10-CM

## 2023-10-02 DIAGNOSIS — E04.1 THYROID NODULE: ICD-10-CM

## 2023-10-02 DIAGNOSIS — R06.02 SHORTNESS OF BREATH: ICD-10-CM

## 2023-10-02 DIAGNOSIS — E78.5 HYPERLIPIDEMIA, UNSPECIFIED HYPERLIPIDEMIA TYPE: ICD-10-CM

## 2023-10-02 DIAGNOSIS — E55.9 VITAMIN D DEFICIENCY: ICD-10-CM

## 2023-10-02 PROCEDURE — 99396 PREV VISIT EST AGE 40-64: CPT | Performed by: INTERNAL MEDICINE

## 2023-10-02 RX ORDER — ALBUTEROL SULFATE 90 UG/1
2 AEROSOL, METERED RESPIRATORY (INHALATION) EVERY 6 HOURS PRN
Qty: 8.5 G | Refills: 3 | Status: SHIPPED | OUTPATIENT
Start: 2023-10-02

## 2023-10-02 RX ORDER — CLINDAMYCIN HYDROCHLORIDE 300 MG/1
300 CAPSULE ORAL 3 TIMES DAILY
Qty: 21 CAPSULE | Refills: 0 | Status: SHIPPED | OUTPATIENT
Start: 2023-10-02 | End: 2023-10-09
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
## 2023-11-29 DIAGNOSIS — R10.9 ABDOMINAL PAIN, UNSPECIFIED ABDOMINAL LOCATION: Primary | ICD-10-CM

## 2023-11-30 ENCOUNTER — OFFICE VISIT (OUTPATIENT)
Dept: URGENT CARE | Age: 63
End: 2023-11-30
Payer: COMMERCIAL

## 2023-11-30 ENCOUNTER — HOSPITAL ENCOUNTER (OUTPATIENT)
Dept: RADIOLOGY | Age: 63
Discharge: HOME | End: 2023-11-30
Payer: COMMERCIAL

## 2023-11-30 VITALS
DIASTOLIC BLOOD PRESSURE: 61 MMHG | SYSTOLIC BLOOD PRESSURE: 103 MMHG | OXYGEN SATURATION: 97 % | RESPIRATION RATE: 18 BRPM | TEMPERATURE: 98.7 F | HEART RATE: 77 BPM

## 2023-11-30 DIAGNOSIS — R10.9 RIGHT SIDED ABDOMINAL PAIN: ICD-10-CM

## 2023-11-30 DIAGNOSIS — R10.9 ABDOMINAL PAIN, UNSPECIFIED ABDOMINAL LOCATION: ICD-10-CM

## 2023-11-30 DIAGNOSIS — J06.9 VIRAL URI WITH COUGH: Primary | ICD-10-CM

## 2023-11-30 DIAGNOSIS — R05.1 ACUTE COUGH: ICD-10-CM

## 2023-11-30 DIAGNOSIS — J02.9 SORE THROAT: ICD-10-CM

## 2023-11-30 LAB
S PYO AG THROAT QL: NEGATIVE
SARS-COV-2 AG UPPER RESP QL IA: NEGATIVE
VALID CONTROL: NORMAL

## 2023-11-30 PROCEDURE — 99213 OFFICE O/P EST LOW 20 MIN: CPT

## 2023-11-30 PROCEDURE — 87811 SARS-COV-2 COVID19 W/OPTIC: CPT

## 2023-11-30 PROCEDURE — 76830 TRANSVAGINAL US NON-OB: CPT

## 2023-11-30 PROCEDURE — 87880 STREP A ASSAY W/OPTIC: CPT

## 2023-11-30 PROCEDURE — 76856 US EXAM PELVIC COMPLETE: CPT

## 2023-11-30 PROCEDURE — 76705 ECHO EXAM OF ABDOMEN: CPT

## 2023-11-30 RX ORDER — BENZONATATE 200 MG/1
200 CAPSULE ORAL 3 TIMES DAILY PRN
Qty: 20 CAPSULE | Refills: 0 | Status: SHIPPED | OUTPATIENT
Start: 2023-11-30

## 2023-11-30 RX ORDER — BROMPHENIRAMINE MALEATE, PSEUDOEPHEDRINE HYDROCHLORIDE, AND DEXTROMETHORPHAN HYDROBROMIDE 2; 30; 10 MG/5ML; MG/5ML; MG/5ML
5 SYRUP ORAL 4 TIMES DAILY PRN
Qty: 120 ML | Refills: 0 | Status: SHIPPED | OUTPATIENT
Start: 2023-11-30

## 2023-11-30 NOTE — PATIENT INSTRUCTIONS
Please trial Tessalon every 8 hours as needed for dry cough. Recommend trialing Bromfed every 6 hours as needed for cough and congestion. Ensure adequate fluid intake. Please trial warm salt water gargles, Chloraseptic spray, Cepacol cough drops and warm tea with honey as needed for sore throat.

## 2023-11-30 NOTE — PROGRESS NOTES
NAME: Lorene Medrano is a 58 y.o. female  : 1960    MRN: [id number]  DATE: 2023  TIME: 8:38 AM    Assessment and Plan   Viral URI with cough [J06.9]  1. Viral URI with cough  benzonatate (TESSALON) 200 MG capsule    brompheniramine-pseudoephedrine-DM 30-2-10 MG/5ML syrup      2. Acute cough  Poct Covid 19 Rapid Antigen Test      3. Sore throat  POCT rapid strepA      4. Right sided abdominal pain          COVID antigen and rapid strep test negative today in office. Recommend supportive care and symptom management with Tessalon and Bromfed. Patient verbalizes understanding of plan of care, all questions and concerns were addressed during this visit. Patient Instructions     Please trial Tessalon every 8 hours as needed for dry cough. Recommend trialing Bromfed every 6 hours as needed for cough and congestion. Ensure adequate fluid intake. Please trial warm salt water gargles, Chloraseptic spray, Cepacol cough drops and warm tea with honey as needed for sore throat. Follow up with PCP in 3-5 days. Proceed to  ER if symptoms worsen. Chief Complaint     Chief Complaint   Patient presents with    Cold Like Symptoms     Patient states that on Monday she started to have some nasal congestion. It progressed and she now has a dry cough with a sore throat. She denies any fevers. She notes constant lower abd pain since a recent trip and she has bloating as well. She is getting ultrasounds today. History of Present Illness       65-year-old female presenting for evaluation of upper respiratory symptoms. Patient states that over the past 3 days she has been experiencing nasal congestion with dry cough and new onset sore throat. She states that she has been taking Mucinex with minimal relief of her symptoms. She denies any known sick exposures. She denies any chest pain, shortness of breath, fevers or chills.   Patient states that she has been experiencing abdominal pain without diarrhea or vomiting over the past 2 weeks. She describes the pain as a dull and aching pain on her right side. She states that her primary care provider is aware of her abdominal pain, and she is scheduled to have an ultrasound later today. Review of Systems   Review of Systems   Constitutional:  Negative for chills and fever. HENT:  Positive for congestion and sore throat. Respiratory:  Positive for cough. Negative for shortness of breath. Cardiovascular:  Negative for chest pain. Gastrointestinal:  Positive for abdominal pain. Negative for diarrhea, nausea and vomiting. All other systems reviewed and are negative.         Current Medications       Current Outpatient Medications:     benzonatate (TESSALON) 200 MG capsule, Take 1 capsule (200 mg total) by mouth 3 (three) times a day as needed for cough, Disp: 20 capsule, Rfl: 0    brompheniramine-pseudoephedrine-DM 30-2-10 MG/5ML syrup, Take 5 mL by mouth 4 (four) times a day as needed for cough or congestion, Disp: 120 mL, Rfl: 0    acetaminophen (TYLENOL) 650 mg CR tablet, Take 2 tablets (1,300 mg total) by mouth every 8 (eight) hours as needed for mild pain, Disp: 30 tablet, Rfl: 0    albuterol (ProAir HFA) 90 mcg/act inhaler, Inhale 2 puffs every 6 (six) hours as needed for wheezing or shortness of breath, Disp: 8.5 g, Rfl: 3    aspirin 81 mg chewable tablet, Chew 1 tablet (81 mg total) daily, Disp: , Rfl: 0    cholecalciferol (VITAMIN D3) 1,000 units tablet, Take 1,000 Units by mouth daily, Disp: , Rfl:     clindamycin (CLEOCIN T) 1 %, 1 pad 2 (two) times a day  (Patient not taking: Reported on 10/2/2023), Disp: , Rfl:     docusate sodium (COLACE) 100 mg capsule, Take 1 capsule (100 mg total) by mouth 2 (two) times a day, Disp: , Rfl: 0    doxycycline hyclate (VIBRAMYCIN) 50 mg capsule, 50 mg as needed, Disp: , Rfl:     estradiol (ESTRACE VAGINAL) 0.1 mg/g vaginal cream, Apply 1/4  of an applicator intravaginally 1-2 times weekly, Disp: 42.5 g, Rfl: 3    ibuprofen (MOTRIN) 800 mg tablet, Take 1 tablet (800 mg total) by mouth every 6 (six) hours as needed for mild pain or moderate pain, Disp: 90 tablet, Rfl: 0    spironolactone (ALDACTONE) 50 mg tablet, Take 2 tabs bid daily, Disp: , Rfl:     triamcinolone (KENALOG) 0.025 % cream, Apply sparingly to vulvar region as needed for irritation (Patient not taking: Reported on 10/2/2023), Disp: 30 g, Rfl: 1    Current Facility-Administered Medications:     cyclobenzaprine (FLEXERIL) tablet 10 mg, 10 mg, Oral, TID PRN, Yajaira Browne PA-C    Current Allergies     Allergies as of 11/30/2023 - Reviewed 11/30/2023   Allergen Reaction Noted    Other Nausea Only and Vomiting 08/29/2014    Azithromycin Other (See Comments) 03/09/2013    Latex  05/27/2022    Penicillins Rash 07/14/2017            The following portions of the patient's history were reviewed and updated as appropriate: allergies, current medications, past family history, past medical history, past social history, past surgical history and problem list.     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     LAST ASSESSED: 3/9/13    Vascular headache        Past Surgical History:   Procedure Laterality Date    APPENDECTOMY      BREAST EXCISIONAL BIOPSY Left 2006    benign    COLONOSCOPY      DILATION AND CURETTAGE OF UTERUS      INCISIONAL BREAST BIOPSY      VA COLONOSCOPY FLX DX W/COLLJ SPEC WHEN PFRMD N/A 07/17/2017    Procedure: COLONOSCOPY;  Surgeon: Pao Kim, MD;  Location: [facility]; Service: Colorectal    CT CYSTOURETHROSCOPY N/A 06/20/2022    Procedure: CYSTOSCOPY;  Surgeon: Sosa Aaron MD;  Location: [facility];  Service: UroGynecology           CT POST COLPORRHAPHY RECTOCELE W/WO PERINEORRHAPHY N/A 06/20/2022    Procedure: POSTERIOR COLPORRHAPHY,  I&D OF ICLUSION LABIAL CYST;  Surgeon: Sosa Aaron MD;  Location: [facility];  Service: UroGynecology           CT SLING OPERATION STRESS INCONTINENCE N/A 06/20/2022    Procedure: [place];  Surgeon: Sosa Aaron MD;  Location: [facility];  Service: UroGynecology           RETINOPATHY SURGERY      retinal tear repair x3       Family History   Problem Relation Age of Onset    Breast cancer Mother 68    Colon cancer Father 61    Aneurysm Sister         brain    Breast cancer Maternal Grandmother 52    Coronary artery disease Family     Breast cancer Family     Colon cancer Family     Hyperlipidemia Family     Emphysema Family         INTERSTITIAL    Osteoarthritis Family     Breast cancer Maternal Aunt 42    Colon cancer Maternal Grandfather 58    No Known Problems Paternal Grandmother     No Known Problems Paternal Grandfather     Breast cancer Cousin 36    No Known Problems Daughter     No Known Problems Sister     No Known Problems Son     No Known Problems Maternal Aunt          Medications have been verified. Objective   /61   Pulse 77   Temp 98.7 °F (37.1 °C)   Resp 18   SpO2 97%        Physical Exam     Physical Exam  Vitals and nursing note reviewed. Constitutional:       General: She is not in acute distress. Appearance: Normal appearance. She is normal weight. She is not ill-appearing, toxic-appearing or diaphoretic. HENT:      Head: Normocephalic and atraumatic. Right Ear: Tympanic membrane normal.      Left Ear: Tympanic membrane normal.      Nose: Congestion present. No rhinorrhea.       Mouth/Throat:      Mouth: Mucous membranes are moist.      Pharynx: Oropharynx is clear. Posterior oropharyngeal erythema present. No oropharyngeal exudate. Eyes:      General:         Right eye: No discharge. Left eye: No discharge. Cardiovascular:      Rate and Rhythm: Normal rate and regular rhythm. Pulses: Normal pulses. Heart sounds: Normal heart sounds. No murmur heard. No friction rub. No gallop. Pulmonary:      Effort: Pulmonary effort is normal. No respiratory distress. Breath sounds: Normal breath sounds. No stridor. No wheezing, rhonchi or rales. Chest:      Chest wall: No tenderness. Abdominal:      General: Bowel sounds are normal.      Palpations: Abdomen is soft. Tenderness: There is abdominal tenderness in the right upper quadrant and right lower quadrant. There is no guarding or rebound. Skin:     General: Skin is warm and dry. Neurological:      Mental Status: She is alert.    Psychiatric:         Mood and Affect: Mood normal.         Behavior: Behavior normal.

## 2023-11-30 NOTE — LETTER
November 30, 2023     Patient: Marjorie Galindo   YOB: 1960   Date of Visit: 11/30/2023       To Whom it May Concern:    Aure Oakley was seen in my clinic on 11/30/2023. Please excuse her from work until her next scheduled shift as long as she is fever free for 24 hours without fever reducing medication. If you have any questions or concerns, please don't hesitate to call.          Sincerely,          LINDSAY Morel        CC: No Recipients

## 2023-12-28 ENCOUNTER — HOSPITAL ENCOUNTER (OUTPATIENT)
Dept: RADIOLOGY | Age: 63
Discharge: HOME | End: 2023-12-28
Payer: COMMERCIAL

## 2023-12-28 DIAGNOSIS — E04.1 THYROID NODULE: ICD-10-CM

## 2023-12-28 PROCEDURE — 76536 US EXAM OF HEAD AND NECK: CPT

## 2024-02-23 DIAGNOSIS — Z11.1 SCREENING EXAMINATION FOR PULMONARY TUBERCULOSIS: Primary | ICD-10-CM

## 2024-03-08 ENCOUNTER — ESTABLISHED COMPREHENSIVE EXAM (OUTPATIENT)
Dept: URBAN - METROPOLITAN AREA CLINIC 6 | Facility: CLINIC | Age: 64
End: 2024-03-08

## 2024-03-08 DIAGNOSIS — H35.372: ICD-10-CM

## 2024-03-08 DIAGNOSIS — H33.312: ICD-10-CM

## 2024-03-08 DIAGNOSIS — H25.813: ICD-10-CM

## 2024-03-08 PROCEDURE — 92014 COMPRE OPH EXAM EST PT 1/>: CPT

## 2024-03-08 PROCEDURE — 92134 CPTRZ OPH DX IMG PST SGM RTA: CPT

## 2024-03-08 ASSESSMENT — VISUAL ACUITY
OD_SC: 20/40-2
OS_SC: 20/40+1
OD_GLARE: 20/40-2
OU_CC: J1+
OS_GLARE: 20/50
OD_PH: 20/40+2

## 2024-03-08 ASSESSMENT — TONOMETRY
OD_IOP_MMHG: 20
OS_IOP_MMHG: 21

## 2024-04-10 ENCOUNTER — OFFICE VISIT (OUTPATIENT)
Dept: INTERNAL MEDICINE CLINIC | Facility: CLINIC | Age: 64
End: 2024-04-10
Payer: MEDICARE

## 2024-04-10 VITALS
TEMPERATURE: 98.4 F | SYSTOLIC BLOOD PRESSURE: 122 MMHG | HEART RATE: 77 BPM | BODY MASS INDEX: 26.4 KG/M2 | DIASTOLIC BLOOD PRESSURE: 80 MMHG | WEIGHT: 149 LBS | HEIGHT: 63 IN | OXYGEN SATURATION: 99 %

## 2024-04-10 DIAGNOSIS — E55.9 VITAMIN D DEFICIENCY: Chronic | ICD-10-CM

## 2024-04-10 DIAGNOSIS — Z86.69 HISTORY OF MIGRAINE: ICD-10-CM

## 2024-04-10 DIAGNOSIS — Z13.220 SCREENING FOR HYPERLIPIDEMIA: ICD-10-CM

## 2024-04-10 DIAGNOSIS — E04.1 THYROID NODULE: ICD-10-CM

## 2024-04-10 DIAGNOSIS — D64.9 ANEMIA, UNSPECIFIED TYPE: ICD-10-CM

## 2024-04-10 DIAGNOSIS — Z13.1 SCREENING FOR DIABETES MELLITUS: ICD-10-CM

## 2024-04-10 DIAGNOSIS — Z76.89 ENCOUNTER TO ESTABLISH CARE: Primary | ICD-10-CM

## 2024-04-10 DIAGNOSIS — Q21.12 PATENT FORAMEN OVALE: Chronic | ICD-10-CM

## 2024-04-10 PROBLEM — R73.9 HYPERGLYCEMIA: Status: RESOLVED | Noted: 2017-03-07 | Resolved: 2024-04-10

## 2024-04-10 PROBLEM — Z01.818 PREOPERATIVE EXAM FOR GYNECOLOGIC SURGERY: Status: RESOLVED | Noted: 2022-05-09 | Resolved: 2024-04-10

## 2024-04-10 PROBLEM — N36.41 HYPERMOBILITY OF URETHRA: Status: RESOLVED | Noted: 2022-06-20 | Resolved: 2024-04-10

## 2024-04-10 PROBLEM — R29.2 HYPER REFLEXIA: Status: RESOLVED | Noted: 2019-10-02 | Resolved: 2024-04-10

## 2024-04-10 PROBLEM — L57.0 ACTINIC KERATOSIS: Status: RESOLVED | Noted: 2024-04-10 | Resolved: 2024-04-10

## 2024-04-10 PROBLEM — G44.89 OTHER HEADACHE SYNDROME: Status: RESOLVED | Noted: 2019-10-05 | Resolved: 2024-04-10

## 2024-04-10 PROBLEM — R29.810 FACIAL DROOP: Status: RESOLVED | Noted: 2019-10-02 | Resolved: 2024-04-10

## 2024-04-10 PROBLEM — R20.9 ABNORMAL ARM SENSATION: Status: RESOLVED | Noted: 2019-10-02 | Resolved: 2024-04-10

## 2024-04-10 PROBLEM — M85.80 OSTEOPENIA: Chronic | Status: RESOLVED | Noted: 2017-10-23 | Resolved: 2024-04-10

## 2024-04-10 PROBLEM — L57.0 ACTINIC KERATOSIS: Status: ACTIVE | Noted: 2024-04-10

## 2024-04-10 PROBLEM — N39.3 STRESS INCONTINENCE (FEMALE) (MALE): Status: RESOLVED | Noted: 2022-06-20 | Resolved: 2024-04-10

## 2024-04-10 PROBLEM — K63.5 COLON POLYPS: Chronic | Status: RESOLVED | Noted: 2017-10-23 | Resolved: 2024-04-10

## 2024-04-10 PROBLEM — R21 SKIN RASH: Status: RESOLVED | Noted: 2021-02-05 | Resolved: 2024-04-10

## 2024-04-10 PROBLEM — N81.6 RECTOCELE: Status: RESOLVED | Noted: 2022-06-20 | Resolved: 2024-04-10

## 2024-04-10 PROBLEM — R10.30 LOWER ABDOMINAL PAIN: Status: RESOLVED | Noted: 2022-05-09 | Resolved: 2024-04-10

## 2024-04-10 PROCEDURE — G2211 COMPLEX E/M VISIT ADD ON: HCPCS

## 2024-04-10 PROCEDURE — 99214 OFFICE O/P EST MOD 30 MIN: CPT

## 2024-04-10 NOTE — PROGRESS NOTES
Assessment and Plan:    Encounter to establish care  Establishing care, prior patient of Dr. Michaud.  History of migraine (previously thought to be TIA) but after discussion between cardiology and neurology, suspect migraine.  No further follow-up needed.  Follows with dermatology.  Recently retired, focusing on weight loss now.  Physically active and mindful of diet.  Ordered for annual labs.  Follow-up in 6 months.     Subjective:     Patient ID: Dory Pulido is a 64 y.o. female.    She presents to the office today as a new patient.  Past medical history includes a PFO that was discovered due to an episode of slurred speech and left-sided facial droop that was initially thought to be a TIA, but was later diagnosed to be a migraine after discussion with neurology and cardiology.  She has been doing well and recently retired from her job at the end of February.  She has been very visibly active since she wants to lose weight before the summer starts.  Her and her  joined a gym and she participates in many cardiovascular exercise classes (such as [employer], HIIT) and has lost 5 to 6 pounds over the past month.  She wants to be able to lose more weight faster, but this is a great achievement to have lost almost 6 pounds in just a short period of time already.  She is doing well otherwise and has no main concerns besides her weight.         Review of Systems   Constitutional:  Negative for activity change, appetite change, chills, fatigue and fever.   Respiratory:  Negative for cough and shortness of breath.    Cardiovascular:  Negative for chest pain and palpitations.   Gastrointestinal:  Negative for abdominal pain, diarrhea, nausea and vomiting.   Neurological:  Negative for dizziness, light-headedness and headaches.   All other systems reviewed and are negative.       Patient Active Problem List   Diagnosis    Fibrocystic breast    Retinal detachment    Vitamin D deficiency    History of migraine    Patent  "foramen ovale    Thyroid nodule    Encounter to establish care        Current Outpatient Medications   Medication Sig Dispense Refill    albuterol (ProAir HFA) 90 mcg/act inhaler Inhale 2 puffs every 6 (six) hours as needed for wheezing or shortness of breath 8.5 g 3    aspirin 81 mg chewable tablet Chew 1 tablet (81 mg total) daily  0    cholecalciferol (VITAMIN D3) 1,000 units tablet Take 1,000 Units by mouth daily      docusate sodium (COLACE) 100 mg capsule Take 1 capsule (100 mg total) by mouth 2 (two) times a day  0    doxycycline hyclate (VIBRAMYCIN) 50 mg capsule 50 mg as needed      estradiol (ESTRACE VAGINAL) 0.1 mg/g vaginal cream Apply 1/4  of an applicator intravaginally 1-2 times weekly 42.5 g 3    spironolactone (ALDACTONE) 50 mg tablet Take 2 tabs bid daily       Current Facility-Administered Medications   Medication Dose Route Frequency Provider Last Rate Last Admin    cyclobenzaprine (FLEXERIL) tablet 10 mg  10 mg Oral TID PRN Toby Gaines PA-C            Allergies   Allergen Reactions    Other Nausea Only and Vomiting     anesthesia    Azithromycin Other (See Comments)     Pt doesn't remember     Latex      Added based on information entered during case entry, please review and add reactions, type, and severity as needed    Penicillins Rash        The following portions of the patient's history were reviewed and updated as appropriate: allergies, current medications, past family history, past medical history, past social history, past surgical history and problem list.          Objective:    /80   Pulse 77   Temp 98.4 °F (36.9 °C)   Ht 5' 3\" (1.6 m)   Wt 67.6 kg (149 lb)   SpO2 99%   BMI 26.39 kg/m²      Body mass index is 26.39 kg/m².     Physical Exam  Vitals reviewed.   Constitutional:       General: She is not in acute distress.     Appearance: Normal appearance. She is well-developed. She is not ill-appearing.   HENT:      Head: Normocephalic.   Eyes:      Conjunctiva/sclera: " Conjunctivae normal.   Cardiovascular:      Rate and Rhythm: Normal rate and regular rhythm.      Pulses: Normal pulses.      Heart sounds: Normal heart sounds.   Pulmonary:      Effort: Pulmonary effort is normal.      Breath sounds: Normal breath sounds.   Abdominal:      General: Bowel sounds are normal.      Palpations: Abdomen is soft.      Tenderness: There is no abdominal tenderness.   Skin:     General: Skin is warm.   Neurological:      Mental Status: She is alert.   Psychiatric:         Mood and Affect: Mood normal.         Behavior: Behavior normal.         Thought Content: Thought content normal.         Judgment: Judgment normal.

## 2024-04-10 NOTE — ASSESSMENT & PLAN NOTE
Establishing care, prior patient of Dr. Michaud.  History of migraine (previously thought to be TIA) but after discussion between cardiology and neurology, suspect migraine.  No further follow-up needed.  Follows with dermatology.  Recently retired, focusing on weight loss now.  Physically active and mindful of diet.  Ordered for annual labs.  Follow-up in 6 months.

## 2024-04-17 ENCOUNTER — APPOINTMENT (OUTPATIENT)
Dept: LAB | Facility: MEDICAL CENTER | Age: 64
End: 2024-04-17
Payer: MEDICARE

## 2024-04-17 DIAGNOSIS — Z13.1 SCREENING FOR DIABETES MELLITUS: ICD-10-CM

## 2024-04-17 DIAGNOSIS — E78.5 HYPERLIPIDEMIA, UNSPECIFIED HYPERLIPIDEMIA TYPE: ICD-10-CM

## 2024-04-17 DIAGNOSIS — D64.9 ANEMIA, UNSPECIFIED TYPE: Primary | ICD-10-CM

## 2024-04-17 DIAGNOSIS — E04.1 THYROID NODULE: ICD-10-CM

## 2024-04-17 LAB
25(OH)D3 SERPL-MCNC: 55.7 NG/ML (ref 30–100)
ALBUMIN SERPL BCP-MCNC: 4.4 G/DL (ref 3.5–5)
ALP SERPL-CCNC: 55 U/L (ref 34–104)
ALT SERPL W P-5'-P-CCNC: 17 U/L (ref 7–52)
ANION GAP SERPL CALCULATED.3IONS-SCNC: 10 MMOL/L (ref 4–13)
AST SERPL W P-5'-P-CCNC: 21 U/L (ref 13–39)
BASOPHILS # BLD AUTO: 0.03 THOUSANDS/ÂΜL (ref 0–0.1)
BASOPHILS NFR BLD AUTO: 1 % (ref 0–1)
BILIRUB SERPL-MCNC: 0.55 MG/DL (ref 0.2–1)
BILIRUB UR QL STRIP: NEGATIVE
BUN SERPL-MCNC: 11 MG/DL (ref 5–25)
CALCIUM SERPL-MCNC: 9.6 MG/DL (ref 8.4–10.2)
CHLORIDE SERPL-SCNC: 104 MMOL/L (ref 96–108)
CHOLEST SERPL-MCNC: 192 MG/DL
CLARITY UR: CLEAR
CO2 SERPL-SCNC: 27 MMOL/L (ref 21–32)
COLOR UR: COLORLESS
CREAT SERPL-MCNC: 0.68 MG/DL (ref 0.6–1.3)
EOSINOPHIL # BLD AUTO: 0.15 THOUSAND/ÂΜL (ref 0–0.61)
EOSINOPHIL NFR BLD AUTO: 3 % (ref 0–6)
ERYTHROCYTE [DISTWIDTH] IN BLOOD BY AUTOMATED COUNT: 13 % (ref 11.6–15.1)
FERRITIN SERPL-MCNC: 61 NG/ML (ref 11–307)
GFR SERPL CREATININE-BSD FRML MDRD: 92 ML/MIN/1.73SQ M
GLUCOSE P FAST SERPL-MCNC: 76 MG/DL (ref 65–99)
GLUCOSE UR STRIP-MCNC: NEGATIVE MG/DL
HCT VFR BLD AUTO: 44.6 % (ref 34.8–46.1)
HDLC SERPL-MCNC: 74 MG/DL
HGB BLD-MCNC: 14.5 G/DL (ref 11.5–15.4)
HGB UR QL STRIP.AUTO: NEGATIVE
IMM GRANULOCYTES # BLD AUTO: 0 THOUSAND/UL (ref 0–0.2)
IMM GRANULOCYTES NFR BLD AUTO: 0 % (ref 0–2)
IRON SATN MFR SERPL: 30 % (ref 15–50)
IRON SERPL-MCNC: 107 UG/DL (ref 50–212)
KETONES UR STRIP-MCNC: NEGATIVE MG/DL
LDLC SERPL CALC-MCNC: 105 MG/DL (ref 0–100)
LEUKOCYTE ESTERASE UR QL STRIP: NEGATIVE
LYMPHOCYTES # BLD AUTO: 2.2 THOUSANDS/ÂΜL (ref 0.6–4.47)
LYMPHOCYTES NFR BLD AUTO: 43 % (ref 14–44)
MCH RBC QN AUTO: 30.4 PG (ref 26.8–34.3)
MCHC RBC AUTO-ENTMCNC: 32.5 G/DL (ref 31.4–37.4)
MCV RBC AUTO: 94 FL (ref 82–98)
MONOCYTES # BLD AUTO: 0.48 THOUSAND/ÂΜL (ref 0.17–1.22)
MONOCYTES NFR BLD AUTO: 9 % (ref 4–12)
NEUTROPHILS # BLD AUTO: 2.22 THOUSANDS/ÂΜL (ref 1.85–7.62)
NEUTS SEG NFR BLD AUTO: 44 % (ref 43–75)
NITRITE UR QL STRIP: NEGATIVE
NRBC BLD AUTO-RTO: 0 /100 WBCS
PH UR STRIP.AUTO: 6.5 [PH]
PLATELET # BLD AUTO: 272 THOUSANDS/UL (ref 149–390)
PMV BLD AUTO: 12 FL (ref 8.9–12.7)
POTASSIUM SERPL-SCNC: 4.1 MMOL/L (ref 3.5–5.3)
PROT SERPL-MCNC: 7.3 G/DL (ref 6.4–8.4)
PROT UR STRIP-MCNC: NEGATIVE MG/DL
RBC # BLD AUTO: 4.77 MILLION/UL (ref 3.81–5.12)
SODIUM SERPL-SCNC: 141 MMOL/L (ref 135–147)
SP GR UR STRIP.AUTO: 1.01 (ref 1–1.03)
TIBC SERPL-MCNC: 356 UG/DL (ref 250–450)
TRIGL SERPL-MCNC: 65 MG/DL
TSH SERPL DL<=0.05 MIU/L-ACNC: 1.25 UIU/ML (ref 0.45–4.5)
UIBC SERPL-MCNC: 249 UG/DL (ref 155–355)
UROBILINOGEN UR STRIP-ACNC: <2 MG/DL
WBC # BLD AUTO: 5.08 THOUSAND/UL (ref 4.31–10.16)

## 2024-04-17 PROCEDURE — 83540 ASSAY OF IRON: CPT

## 2024-04-17 PROCEDURE — 82728 ASSAY OF FERRITIN: CPT

## 2024-04-17 PROCEDURE — 80061 LIPID PANEL: CPT

## 2024-04-17 PROCEDURE — 81003 URINALYSIS AUTO W/O SCOPE: CPT

## 2024-04-17 PROCEDURE — 84443 ASSAY THYROID STIM HORMONE: CPT

## 2024-04-17 PROCEDURE — 83550 IRON BINDING TEST: CPT

## 2024-04-26 ENCOUNTER — TELEPHONE (OUTPATIENT)
Age: 64
End: 2024-04-26

## 2024-04-26 NOTE — TELEPHONE ENCOUNTER
Patient is requestiong a vaginal cream comparable to her Estradiol cream and that is good for sensitivity.  She is on medicare D; cost of RX is more expensive. She requested to speak w/Dr. Oconnell.

## 2024-04-26 NOTE — TELEPHONE ENCOUNTER
Patient called to check if her lipid panel result were received and DR Joseph can see them since it was ordered by a different doctor. Confirmed to patients results were received

## 2024-06-04 ENCOUNTER — ANNUAL EXAM (OUTPATIENT)
Dept: OBGYN CLINIC | Facility: CLINIC | Age: 64
End: 2024-06-04
Payer: MEDICARE

## 2024-06-04 VITALS
HEIGHT: 63 IN | BODY MASS INDEX: 25.55 KG/M2 | SYSTOLIC BLOOD PRESSURE: 102 MMHG | WEIGHT: 144.2 LBS | DIASTOLIC BLOOD PRESSURE: 68 MMHG

## 2024-06-04 DIAGNOSIS — Z01.419 PAP SMEAR, AS PART OF ROUTINE GYNECOLOGICAL EXAMINATION: ICD-10-CM

## 2024-06-04 DIAGNOSIS — Z12.31 ENCOUNTER FOR SCREENING MAMMOGRAM FOR MALIGNANT NEOPLASM OF BREAST: ICD-10-CM

## 2024-06-04 DIAGNOSIS — N95.2 ATROPHIC VAGINITIS: ICD-10-CM

## 2024-06-04 DIAGNOSIS — Z01.419 ENCOUNTER FOR GYNECOLOGICAL EXAMINATION WITHOUT ABNORMAL FINDING: Primary | ICD-10-CM

## 2024-06-04 PROCEDURE — G0476 HPV COMBO ASSAY CA SCREEN: HCPCS | Performed by: OBSTETRICS & GYNECOLOGY

## 2024-06-04 PROCEDURE — G0145 SCR C/V CYTO,THINLAYER,RESCR: HCPCS | Performed by: OBSTETRICS & GYNECOLOGY

## 2024-06-04 PROCEDURE — G0101 CA SCREEN;PELVIC/BREAST EXAM: HCPCS | Performed by: OBSTETRICS & GYNECOLOGY

## 2024-06-04 RX ORDER — ESTRADIOL 0.1 MG/G
CREAM VAGINAL
Qty: 42.5 G | Refills: 3 | Status: SHIPPED | OUTPATIENT
Start: 2024-06-04

## 2024-06-04 NOTE — PROGRESS NOTES
Assessment/Plan:    No problem-specific Assessment & Plan notes found for this encounter.       Diagnoses and all orders for this visit:    Encounter for gynecological examination without abnormal finding    Pap Smear as part of routine GYN exam    Encounter for screening mammogram for malignant neoplasm of breast    Atrophic vaginitis    Normal gynecological physical examination.  Self-breast examination stressed.  Mammogram ordered.  Printed script for vaginal cream.   Discussed regular exercise, healthy diet, importance of vitamin D and calcium supplements.  Discussed importance of sun block use during periods of prolonged sun exposure.  Patient will be seen in 1 year for routine gynecologic and medical examination.  Patient will call office for any problems, concerns, or issues which may arise during the interim.       Subjective:     HPI    Patient ID: Dory Pulido is a 64 y.o. female who presents today for her annual gynecologic and medical examination. L breast pruritus around 5-6 oclock position. Applies CeraVe lotion which helps it. She denies any abnormal vaginal discharge, bleeding, irritation, dryness, or general discomfort. Patient denies any changes to the breasts including any pain, tenderness, masses, or nipple discharge. Last PAP normal.    Vaginal cream too expensive with Medicare. Requesting print out of prescription to find cheapest place. Also requesting generic.     Menstrual status: postmenopausal, denies vaginal bleeding    Vasomotor symptoms: admits to intermittent night sweats x1 year (~once every 3 months)    Patient reports normal appetite    Patient reports normal bowel and bladder habits    Patient denies any significant pelvic or abdominal pain    Patient denies any headaches, chest pain, shortness of breath fever shakes or chills    Patient denies any COVID 19 symptoms including cough or loss of taste or smell    COVID vaccine status: Pt understand covid vaccination  "protocol    Medical problems: PFO followed by PCP    Colonoscopy status: completed in 12/2022 with f/u in 5 years    Mammogram status:due for mammo, hx of L breast mass that was unremarkable o      The following portions of the patient's history were reviewed and updated as appropriate: allergies, current medications, past family history, past medical history, past social history, past surgical history and problem list.    Review of Systems   Constitutional: Negative.  Negative for appetite change, diaphoresis, fatigue, fever and unexpected weight change.   HENT: Negative.     Eyes: Negative.    Respiratory: Negative.     Cardiovascular: Negative.    Gastrointestinal: Negative.  Negative for abdominal pain, blood in stool, constipation, diarrhea, nausea and vomiting.   Endocrine: Negative.  Negative for cold intolerance and heat intolerance.   Genitourinary: Negative.  Negative for dysuria, frequency, hematuria, urgency, vaginal bleeding, vaginal discharge and vaginal pain.   Musculoskeletal: Negative.    Skin: Negative.    Allergic/Immunologic: Negative.    Neurological: Negative.    Hematological: Negative.  Negative for adenopathy.   Psychiatric/Behavioral: Negative.           Objective:    /68   Ht 5' 3\" (1.6 m)   Wt 65.4 kg (144 lb 3.2 oz) Comment: pt stated weight at home  BMI 25.54 kg/m²      Physical Exam  Constitutional:       General: She is not in acute distress.     Appearance: Normal appearance. She is well-developed. She is not diaphoretic.   HENT:      Head: Normocephalic and atraumatic.   Eyes:      Pupils: Pupils are equal, round, and reactive to light.   Cardiovascular:      Rate and Rhythm: Normal rate and regular rhythm.      Heart sounds: Murmur (PFO) heard.      No friction rub. No gallop.   Pulmonary:      Effort: Pulmonary effort is normal.      Breath sounds: Normal breath sounds.   Chest:   Breasts:     Breasts are symmetrical.      Right: No inverted nipple, mass, nipple " discharge, skin change or tenderness.      Left: No inverted nipple, mass, nipple discharge, skin change or tenderness.   Abdominal:      General: Bowel sounds are normal.      Palpations: Abdomen is soft.   Genitourinary:     General: Normal vulva.      Exam position: Supine.      Labia:         Right: No rash or lesion.         Left: No rash or lesion.       Vagina: Normal. No vaginal discharge, erythema, tenderness or bleeding.      Cervix: No discharge or friability.      Uterus: Not enlarged and not tender.       Adnexa:         Right: No mass, tenderness or fullness.          Left: No mass, tenderness or fullness.        Rectum: Normal. Guaiac result negative.      Comments: Mild vaginal atrophy. Good pelvic support confirmed.   Musculoskeletal:         General: Normal range of motion.      Cervical back: Normal range of motion and neck supple.   Lymphadenopathy:      Cervical: No cervical adenopathy.      Upper Body:      Right upper body: No supraclavicular adenopathy.      Left upper body: No supraclavicular adenopathy.   Skin:     General: Skin is warm and dry.      Findings: No rash.   Neurological:      General: No focal deficit present.      Mental Status: She is alert and oriented to person, place, and time.   Psychiatric:         Mood and Affect: Mood normal.         Speech: Speech normal.         Behavior: Behavior normal.         Thought Content: Thought content normal.         Judgment: Judgment normal.

## 2024-06-04 NOTE — PATIENT INSTRUCTIONS
Normal gynecological physical examination.  Printed script for vaginal cream.   Discussed advantages of use of estradiol cream.   Self-breast examination stressed.  Mammogram ordered.  Discussed regular exercise, healthy diet, importance of vitamin D and calcium supplements.  Discussed importance of sun block use during periods of prolonged sun exposure.  Patient will be seen in 1 year for routine gynecologic and medical examination.  Patient will call office for any problems, concerns, or issues which may arise during the interim.

## 2024-06-11 LAB
LAB AP GYN PRIMARY INTERPRETATION: NORMAL
Lab: NORMAL

## 2024-06-17 ENCOUNTER — OFFICE VISIT (OUTPATIENT)
Dept: SURGERY | Facility: CLINIC | Age: 64
End: 2024-06-17
Payer: MEDICARE

## 2024-06-17 VITALS — HEIGHT: 64 IN | WEIGHT: 144 LBS | BODY MASS INDEX: 24.59 KG/M2

## 2024-06-17 DIAGNOSIS — E04.1 THYROID NODULE: Primary | ICD-10-CM

## 2024-06-17 PROCEDURE — 99213 OFFICE O/P EST LOW 20 MIN: CPT | Performed by: SURGERY

## 2024-06-17 NOTE — PROGRESS NOTES
Assessment/Plan: Patient has a history of a right thyroid nodule.  This was first noted in 2020.  He has remained static over the last 4 years.  It is all worrisome findings other than its size.    Patient returns for follow-up visit.  Overall she feels well.  She has no symptoms of compression.  Thyroid function tests are normal.    Physical examination is stable.  There is mild evidence for a right-sided nodule.  It is not prominent.  This is easily with deglutition.  There is no evidence for cervical adenopathy.    Ultrasound-guided biopsy versus observation was discussed.  Since this is remained static, I recommended an ultrasound at 1 year.  Patient is agreeable.    There are no diagnoses linked to this encounter.    Subjective:      Patient ID: Dory Pulido is a 64 y.o. female.    Patient presents for yearly thyroid exam.  Denies problems.  Labs 4/17/2024  Ultrasound thyroid 12/28/2023   IMPRESSION:  Right lower pole nodule meeting criteria for biopsy but unchanged since 2020. Tissue sampling versus continued follow-up as clinically indicated.         Thyroid Problem  Patient reports no anxiety.       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  She  has a past surgical history that includes Retinopathy surgery; Colonoscopy; Appendectomy; pr colonoscopy flx dx w/collj spec when pfrmd (N/A, 07/17/2017); Dilation and curettage of uterus; Incisional breast biopsy; Breast excisional biopsy (Left, 2006); pr post colporrhaphy rectocele w/wo perineorrhaphy  (N/A, 06/20/2022); pr sling operation stress incontinence (N/A, 06/20/2022); pr cystourethroscopy (N/A, 06/20/2022); and Eye surgery.  Her family history includes Aneurysm in her sister; Breast cancer in her family; Breast cancer (age of onset: 40) in her cousin; Breast cancer (age of onset: 42) in her maternal aunt; Breast cancer (age of onset: 49) in her maternal grandmother; Breast cancer (age of onset: 77) in her mother; Colon cancer in her family; Colon cancer (age of onset: 60) in her father; Colon cancer (age of onset: 62) in her maternal grandfather; Coronary artery disease in her family and father; Emphysema in her family; Hyperlipidemia in her family; No Known Problems in her daughter, maternal aunt, paternal grandfather, paternal grandmother, sister, and son; Osteoarthritis in her family.  She  reports that she has never smoked. She has never used smokeless tobacco. She reports current alcohol use of about 1.0 standard drink of alcohol per week. She reports that she does not use drugs.  Current Outpatient Medications   Medication Sig Dispense Refill    albuterol (ProAir HFA) 90 mcg/act inhaler Inhale 2 puffs every 6 (six) hours as needed for wheezing or shortness of breath 8.5 g 3    aspirin 81 mg chewable tablet Chew 1 tablet (81 mg total) daily  0    cholecalciferol (VITAMIN D3) 1,000 units tablet Take 1,000 Units by mouth daily      docusate sodium (COLACE) 100 mg capsule Take 1 capsule (100 mg total) by mouth 2 (two) times a day  0    doxycycline hyclate (VIBRAMYCIN) 50 mg capsule 50 mg as needed      estradiol (ESTRACE VAGINAL) 0.1 mg/g vaginal cream Apply 1/4  of an applicator intravaginally 1-2 times weekly 42.5 g 3    spironolactone (ALDACTONE) 50 mg tablet Take 2 tabs bid daily       Current Facility-Administered Medications   Medication Dose Route Frequency Provider Last Rate Last Admin    cyclobenzaprine (FLEXERIL) tablet 10 mg  10 mg Oral TID PRN Toby Gaines PA-C         She is allergic to  "other, azithromycin, latex, and penicillins..    Review of Systems   Constitutional: Negative.  Negative for activity change.   HENT: Negative.  Negative for sore throat, trouble swallowing and voice change.    Eyes: Negative.    Respiratory: Negative.     Cardiovascular: Negative.    Gastrointestinal: Negative.    Endocrine: Negative.    Genitourinary: Negative.    Musculoskeletal: Negative.    Skin: Negative.    Allergic/Immunologic: Negative.    Neurological: Negative.    Psychiatric/Behavioral:  Negative for agitation, behavioral problems and confusion. The patient is not nervous/anxious.          Objective:      Ht 5' 4\" (1.626 m)   Wt 65.3 kg (144 lb)   BMI 24.72 kg/m²          Physical Exam  Constitutional:       Appearance: Normal appearance. She is well-developed.   HENT:      Head: Atraumatic.   Neck:      Thyroid: No thyroid tenderness.     Cardiovascular:      Rate and Rhythm: Normal rate and regular rhythm.      Heart sounds: Normal heart sounds.   Pulmonary:      Effort: Pulmonary effort is normal.   Musculoskeletal:      Cervical back: Neck supple.   Lymphadenopathy:      Cervical: No cervical adenopathy.   Skin:     General: Skin is warm and dry.   Neurological:      Mental Status: She is alert and oriented to person, place, and time.   Psychiatric:         Mood and Affect: Mood normal.         "

## 2024-07-11 ENCOUNTER — TELEPHONE (OUTPATIENT)
Age: 64
End: 2024-07-11

## 2024-07-11 NOTE — TELEPHONE ENCOUNTER
Patient calling in for her and . Congestion, no longer positive for covid. Post nasal drip. Colored mucous. Feels illness moved into sinuses.     Asking for an antibiotic to be sent in, not to be seen in office. Please call to discuss.

## 2024-07-12 ENCOUNTER — TELEPHONE (OUTPATIENT)
Dept: INTERNAL MEDICINE CLINIC | Facility: CLINIC | Age: 64
End: 2024-07-12

## 2024-07-12 DIAGNOSIS — J20.8 ACUTE BACTERIAL BRONCHITIS: Primary | ICD-10-CM

## 2024-07-12 DIAGNOSIS — B96.89 ACUTE BACTERIAL BRONCHITIS: Primary | ICD-10-CM

## 2024-07-12 RX ORDER — DOXYCYCLINE HYCLATE 100 MG/1
100 CAPSULE ORAL EVERY 12 HOURS SCHEDULED
Qty: 20 CAPSULE | Refills: 0 | Status: SHIPPED | OUTPATIENT
Start: 2024-07-12 | End: 2024-07-22

## 2024-08-09 ENCOUNTER — HOSPITAL ENCOUNTER (OUTPATIENT)
Dept: RADIOLOGY | Facility: MEDICAL CENTER | Age: 64
Discharge: HOME | End: 2024-08-09
Payer: MEDICARE

## 2024-08-09 VITALS — HEIGHT: 64 IN | WEIGHT: 144 LBS | BODY MASS INDEX: 24.59 KG/M2

## 2024-08-09 DIAGNOSIS — Z12.31 ENCOUNTER FOR SCREENING MAMMOGRAM FOR MALIGNANT NEOPLASM OF BREAST: ICD-10-CM

## 2024-08-09 PROCEDURE — 77063 BREAST TOMOSYNTHESIS BI: CPT

## 2024-08-09 PROCEDURE — 77067 SCR MAMMO BI INCL CAD: CPT

## 2024-08-24 ENCOUNTER — APPOINTMENT (OUTPATIENT)
Dept: RADIOLOGY | Facility: MEDICAL CENTER | Age: 64
End: 2024-08-24
Payer: MEDICARE

## 2024-08-24 ENCOUNTER — OFFICE VISIT (OUTPATIENT)
Dept: URGENT CARE | Facility: MEDICAL CENTER | Age: 64
End: 2024-08-24
Payer: MEDICARE

## 2024-08-24 VITALS
RESPIRATION RATE: 20 BRPM | DIASTOLIC BLOOD PRESSURE: 67 MMHG | OXYGEN SATURATION: 95 % | TEMPERATURE: 100.5 F | SYSTOLIC BLOOD PRESSURE: 113 MMHG | HEART RATE: 106 BPM

## 2024-08-24 DIAGNOSIS — R05.1 ACUTE COUGH: Primary | ICD-10-CM

## 2024-08-24 DIAGNOSIS — R05.1 ACUTE COUGH: ICD-10-CM

## 2024-08-24 PROCEDURE — 71046 X-RAY EXAM CHEST 2 VIEWS: CPT

## 2024-08-24 PROCEDURE — G0463 HOSPITAL OUTPT CLINIC VISIT: HCPCS | Performed by: PHYSICIAN ASSISTANT

## 2024-08-24 PROCEDURE — 99213 OFFICE O/P EST LOW 20 MIN: CPT | Performed by: PHYSICIAN ASSISTANT

## 2024-08-24 RX ORDER — DOXYCYCLINE 50 MG/1
50 TABLET ORAL 2 TIMES DAILY
COMMUNITY

## 2024-08-24 RX ORDER — LEVOFLOXACIN 750 MG/1
750 TABLET, FILM COATED ORAL EVERY 24 HOURS
Qty: 7 TABLET | Refills: 0 | Status: SHIPPED | OUTPATIENT
Start: 2024-08-24 | End: 2024-08-31

## 2024-08-24 RX ORDER — BROMPHENIRAMINE MALEATE, PSEUDOEPHEDRINE HYDROCHLORIDE, AND DEXTROMETHORPHAN HYDROBROMIDE 2; 30; 10 MG/5ML; MG/5ML; MG/5ML
5 SYRUP ORAL 4 TIMES DAILY PRN
Qty: 120 ML | Refills: 0 | Status: SHIPPED | OUTPATIENT
Start: 2024-08-24 | End: 2024-08-29

## 2024-08-24 RX ORDER — BENZONATATE 100 MG/1
100 CAPSULE ORAL 3 TIMES DAILY PRN
Qty: 20 CAPSULE | Refills: 0 | Status: SHIPPED | OUTPATIENT
Start: 2024-08-24 | End: 2024-08-24

## 2024-08-24 NOTE — PROGRESS NOTES
[facility]        NAME: Dory Pulido is a 64 y.o. female  : 1960    MRN: [id number]  DATE: 2024  TIME: 6:14 PM    Assessment and Plan   Acute cough [R05.1]  1. Acute cough  XR chest pa & lateral    levofloxacin (LEVAQUIN) 750 mg tablet    benzonatate (TESSALON PERLES) 100 mg capsule            Patient Instructions     Cough  Levaquin once daily x 7 days  Tessalon Perles as needed for cough  Follow up with PCP in 3-5 days.  Proceed to  ER if symptoms worsen.    Chief Complaint     Chief Complaint   Patient presents with    Nasal Congestion    Cough    Facial Pain     Left sided facial pain, sinus pressure, nasal congestion, productive cough ongoing for over a week; patient states she was initially hesitant for antibiotics but symptoms have gotten progressively worse; COVID positive back in July; currently prescribed doxy for skin     Sore Throat         History of Present Illness       64-year-old female who presents complaining of sore throat, pain on swallowing, cough, sinus pressure, nasal congestion.  Patient states that she had COVID approximately 1 month ago and was treated with antibiotics which she finished approximately 2 weeks ago was feeling well until 7 days ago when all the symptoms started again.  Denies chest pain, shortness of breath.    Cough  Associated symptoms include a sore throat.   Sore Throat   Associated symptoms include coughing.       Review of Systems   Review of Systems   HENT:  Positive for sore throat.    Respiratory:  Positive for cough.          Current Medications       Current Outpatient Medications:     aspirin 81 mg chewable tablet, Chew 1 tablet (81 mg total) daily, Disp: , Rfl: 0    benzonatate (TESSALON PERLES) 100 mg capsule, Take 1 capsule (100 mg total) by mouth 3 (three) times a day as needed for cough, Disp: 20 capsule, Rfl: 0    cholecalciferol (VITAMIN D3) 1,000 units tablet, Take 1,000 Units by mouth daily, Disp: , Rfl:      doxycycline (ADOXA) 50 MG tablet, Take 50 mg by mouth 2 (two) times a day, Disp: , Rfl:     estradiol (ESTRACE VAGINAL) 0.1 mg/g vaginal cream, Apply 1/4  of an applicator intravaginally 1-2 times weekly, Disp: 42.5 g, Rfl: 3    levofloxacin (LEVAQUIN) 750 mg tablet, Take 1 tablet (750 mg total) by mouth every 24 hours for 7 days, Disp: 7 tablet, Rfl: 0    spironolactone (ALDACTONE) 50 mg tablet, Take 2 tabs bid daily, Disp: , Rfl:     albuterol (ProAir HFA) 90 mcg/act inhaler, Inhale 2 puffs every 6 (six) hours as needed for wheezing or shortness of breath, Disp: 8.5 g, Rfl: 3    Current Facility-Administered Medications:     cyclobenzaprine (FLEXERIL) tablet 10 mg, 10 mg, Oral, TID PRN, Toby Gaines PA-C    Current Allergies     Allergies as of 08/24/2024 - Reviewed 08/24/2024   Allergen Reaction Noted    Other Nausea Only and Vomiting 08/29/2014    Azithromycin Other (See Comments) 03/09/2013    Latex  05/27/2022    Penicillins Rash 07/14/2017            The following portions of the patient's history were reviewed and updated as appropriate: allergies, current medications, past family history, past medical history, past social history, past surgical history and problem list.     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     LAST ASSESSED: 3/9/13    Vascular headache        Past Surgical History:   Procedure Laterality Date    APPENDECTOMY      BREAST EXCISIONAL BIOPSY Left 2006    benign    COLONOSCOPY      DILATION AND  CURETTAGE OF UTERUS      EYE SURGERY      INCISIONAL BREAST BIOPSY      OH COLONOSCOPY FLX DX W/COLLJ SPEC WHEN PFRMD N/A 07/17/2017    Procedure: COLONOSCOPY;  Surgeon: Kevin Mohan MD;  Location: [facility];  Service: Colorectal    OH CYSTOURETHROSCOPY N/A 06/20/2022    Procedure: CYSTOSCOPY;  Surgeon: Sav Blackwood MD;  Location: [facility];  Service: UroGynecology           OH POST COLPORRHAPHY RECTOCELE W/WO PERINEORRHAPHY N/A 06/20/2022    Procedure: POSTERIOR COLPORRHAPHY,  I&D OF ICLUSION LABIAL CYST;  Surgeon: Sav Blackwood MD;  Location[facility];  Service: UroGynecology           OH SLING OPERATION STRESS INCONTINENCE N/A 06/20/2022    Procedure: RETROPUBIC SLING;  Surgeon: Sav Blackwood MD;  Location[facility];  Service: UroGynecology           RETINOPATHY SURGERY      retinal tear repair x3       Family History   Problem Relation Age of Onset    Breast cancer Mother 77    Colon cancer Father 60    Coronary artery disease Father     Aneurysm Sister         brain    Breast cancer Maternal Grandmother 49    Coronary artery disease Family     Breast cancer Family     Colon cancer Family     Hyperlipidemia Family     Emphysema Family         INTERSTITIAL    Osteoarthritis Family     Breast cancer Maternal Aunt 42    Colon cancer Maternal Grandfather 62    No Known Problems Paternal Grandmother     No Known Problems Paternal Grandfather     Breast cancer Cousin 40    No Known Problems Daughter     No Known Problems Sister     No Known Problems Son     No Known Problems Maternal Aunt          Medications have been verified.        Objective   /67   Pulse (!) 106   Temp 100.5 °F (38.1 °C) (Temporal)   Resp 20   SpO2 95%        Physical Exam     Physical Exam  Constitutional:       General: She is not in acute distress.     Appearance: She is well-developed. She is not diaphoretic.   HENT:      Head: Normocephalic and atraumatic.      Right Ear: Hearing, tympanic membrane, ear canal  and external ear normal.      Left Ear: Hearing, tympanic membrane, ear canal and external ear normal.      Nose: Rhinorrhea present.      Mouth/Throat:      Mouth: Oropharynx is clear and moist and mucous membranes are normal.      Pharynx: Uvula midline.   Cardiovascular:      Rate and Rhythm: Normal rate and regular rhythm.      Heart sounds: Normal heart sounds.   Pulmonary:      Effort: Pulmonary effort is normal. No respiratory distress.      Breath sounds: Normal breath sounds. No stridor. No wheezing, rhonchi or rales.   Chest:      Chest wall: No tenderness.   Musculoskeletal:      Cervical back: Normal range of motion and neck supple.   Lymphadenopathy:      Cervical: Cervical adenopathy present.

## 2024-08-24 NOTE — PATIENT INSTRUCTIONS
Cough  Levaquin once daily x 7 days  Tessalon Perles as needed for cough  Follow up with PCP in 3-5 days.  Proceed to  ER if symptoms worsen.

## 2024-09-19 DIAGNOSIS — D64.9 ANEMIA, UNSPECIFIED TYPE: Primary | ICD-10-CM

## 2024-09-19 DIAGNOSIS — E04.1 THYROID NODULE: ICD-10-CM

## 2024-09-19 DIAGNOSIS — Z13.1 SCREENING FOR DIABETES MELLITUS: ICD-10-CM

## 2024-09-19 DIAGNOSIS — E78.5 HYPERLIPIDEMIA, UNSPECIFIED HYPERLIPIDEMIA TYPE: ICD-10-CM

## 2024-10-09 ENCOUNTER — APPOINTMENT (OUTPATIENT)
Dept: LAB | Facility: MEDICAL CENTER | Age: 64
End: 2024-10-09
Payer: MEDICARE

## 2024-10-09 DIAGNOSIS — E04.1 THYROID NODULE: ICD-10-CM

## 2024-10-09 DIAGNOSIS — Z13.1 SCREENING FOR DIABETES MELLITUS: ICD-10-CM

## 2024-10-09 DIAGNOSIS — D64.9 ANEMIA, UNSPECIFIED TYPE: ICD-10-CM

## 2024-10-09 DIAGNOSIS — E78.5 HYPERLIPIDEMIA, UNSPECIFIED HYPERLIPIDEMIA TYPE: ICD-10-CM

## 2024-10-09 LAB
ALBUMIN SERPL BCG-MCNC: 4.3 G/DL (ref 3.5–5)
ALP SERPL-CCNC: 62 U/L (ref 34–104)
ALT SERPL W P-5'-P-CCNC: 17 U/L (ref 7–52)
ANION GAP SERPL CALCULATED.3IONS-SCNC: 9 MMOL/L (ref 4–13)
AST SERPL W P-5'-P-CCNC: 23 U/L (ref 13–39)
BASOPHILS # BLD AUTO: 0.03 THOUSANDS/ΜL (ref 0–0.1)
BASOPHILS NFR BLD AUTO: 1 % (ref 0–1)
BILIRUB SERPL-MCNC: 0.6 MG/DL (ref 0.2–1)
BILIRUB UR QL STRIP: NEGATIVE
BUN SERPL-MCNC: 17 MG/DL (ref 5–25)
CALCIUM SERPL-MCNC: 9.5 MG/DL (ref 8.4–10.2)
CHLORIDE SERPL-SCNC: 101 MMOL/L (ref 96–108)
CHOLEST SERPL-MCNC: 203 MG/DL
CLARITY UR: CLEAR
CO2 SERPL-SCNC: 28 MMOL/L (ref 21–32)
COLOR UR: COLORLESS
CREAT SERPL-MCNC: 0.68 MG/DL (ref 0.6–1.3)
EOSINOPHIL # BLD AUTO: 0.19 THOUSAND/ΜL (ref 0–0.61)
EOSINOPHIL NFR BLD AUTO: 3 % (ref 0–6)
ERYTHROCYTE [DISTWIDTH] IN BLOOD BY AUTOMATED COUNT: 13.5 % (ref 11.6–15.1)
FERRITIN SERPL-MCNC: 61 NG/ML (ref 11–307)
GFR SERPL CREATININE-BSD FRML MDRD: 92 ML/MIN/1.73SQ M
GLUCOSE P FAST SERPL-MCNC: 81 MG/DL (ref 65–99)
GLUCOSE UR STRIP-MCNC: NEGATIVE MG/DL
HCT VFR BLD AUTO: 44.2 % (ref 34.8–46.1)
HDLC SERPL-MCNC: 70 MG/DL
HGB BLD-MCNC: 13.7 G/DL (ref 11.5–15.4)
HGB UR QL STRIP.AUTO: NEGATIVE
IMM GRANULOCYTES # BLD AUTO: 0.02 THOUSAND/UL (ref 0–0.2)
IMM GRANULOCYTES NFR BLD AUTO: 0 % (ref 0–2)
IRON SATN MFR SERPL: 41 % (ref 15–50)
IRON SERPL-MCNC: 146 UG/DL (ref 50–212)
KETONES UR STRIP-MCNC: NEGATIVE MG/DL
LDLC SERPL CALC-MCNC: 117 MG/DL (ref 0–100)
LEUKOCYTE ESTERASE UR QL STRIP: NEGATIVE
LYMPHOCYTES # BLD AUTO: 2.14 THOUSANDS/ΜL (ref 0.6–4.47)
LYMPHOCYTES NFR BLD AUTO: 37 % (ref 14–44)
MCH RBC QN AUTO: 29.7 PG (ref 26.8–34.3)
MCHC RBC AUTO-ENTMCNC: 31 G/DL (ref 31.4–37.4)
MCV RBC AUTO: 96 FL (ref 82–98)
MONOCYTES # BLD AUTO: 0.55 THOUSAND/ΜL (ref 0.17–1.22)
MONOCYTES NFR BLD AUTO: 10 % (ref 4–12)
NEUTROPHILS # BLD AUTO: 2.86 THOUSANDS/ΜL (ref 1.85–7.62)
NEUTS SEG NFR BLD AUTO: 49 % (ref 43–75)
NITRITE UR QL STRIP: NEGATIVE
NRBC BLD AUTO-RTO: 0 /100 WBCS
PH UR STRIP.AUTO: 6.5 [PH]
PLATELET # BLD AUTO: 272 THOUSANDS/UL (ref 149–390)
PMV BLD AUTO: 11.7 FL (ref 8.9–12.7)
POTASSIUM SERPL-SCNC: 4.3 MMOL/L (ref 3.5–5.3)
PROT SERPL-MCNC: 7.2 G/DL (ref 6.4–8.4)
PROT UR STRIP-MCNC: NEGATIVE MG/DL
RBC # BLD AUTO: 4.62 MILLION/UL (ref 3.81–5.12)
SODIUM SERPL-SCNC: 138 MMOL/L (ref 135–147)
SP GR UR STRIP.AUTO: 1.01 (ref 1–1.03)
TIBC SERPL-MCNC: 352 UG/DL (ref 250–450)
TRIGL SERPL-MCNC: 82 MG/DL
TSH SERPL DL<=0.05 MIU/L-ACNC: 2.06 UIU/ML (ref 0.45–4.5)
UIBC SERPL-MCNC: 206 UG/DL (ref 155–355)
UROBILINOGEN UR STRIP-ACNC: <2 MG/DL
WBC # BLD AUTO: 5.79 THOUSAND/UL (ref 4.31–10.16)

## 2024-10-09 PROCEDURE — 36415 COLL VENOUS BLD VENIPUNCTURE: CPT

## 2024-10-09 PROCEDURE — 80061 LIPID PANEL: CPT

## 2024-10-09 PROCEDURE — 80053 COMPREHEN METABOLIC PANEL: CPT

## 2024-10-09 PROCEDURE — 84443 ASSAY THYROID STIM HORMONE: CPT

## 2024-10-09 PROCEDURE — 83550 IRON BINDING TEST: CPT

## 2024-10-09 PROCEDURE — 81003 URINALYSIS AUTO W/O SCOPE: CPT

## 2024-10-09 PROCEDURE — 82728 ASSAY OF FERRITIN: CPT

## 2024-10-09 PROCEDURE — 85025 COMPLETE CBC W/AUTO DIFF WBC: CPT

## 2024-10-09 PROCEDURE — 83540 ASSAY OF IRON: CPT

## 2024-10-22 ENCOUNTER — RA CDI HCC (OUTPATIENT)
Dept: OTHER | Facility: HOSPITAL | Age: 64
End: 2024-10-22

## 2024-10-22 PROBLEM — Z76.89 ENCOUNTER TO ESTABLISH CARE: Status: RESOLVED | Noted: 2024-04-10 | Resolved: 2024-10-22

## 2024-10-29 ENCOUNTER — OFFICE VISIT (OUTPATIENT)
Age: 64
End: 2024-10-29
Payer: MEDICARE

## 2024-10-29 VITALS
OXYGEN SATURATION: 98 % | HEIGHT: 64 IN | WEIGHT: 146.8 LBS | DIASTOLIC BLOOD PRESSURE: 78 MMHG | BODY MASS INDEX: 25.06 KG/M2 | SYSTOLIC BLOOD PRESSURE: 118 MMHG | HEART RATE: 66 BPM

## 2024-10-29 DIAGNOSIS — J45.20 MILD INTERMITTENT ASTHMA WITHOUT COMPLICATION: ICD-10-CM

## 2024-10-29 DIAGNOSIS — Z23 ENCOUNTER FOR IMMUNIZATION: ICD-10-CM

## 2024-10-29 DIAGNOSIS — E04.1 THYROID NODULE: ICD-10-CM

## 2024-10-29 DIAGNOSIS — Z00.00 MEDICARE ANNUAL WELLNESS VISIT, INITIAL: Primary | ICD-10-CM

## 2024-10-29 DIAGNOSIS — Z78.0 POSTMENOPAUSE: ICD-10-CM

## 2024-10-29 DIAGNOSIS — L71.9 ROSACEA: ICD-10-CM

## 2024-10-29 PROBLEM — Z86.69 HISTORY OF MIGRAINE: Status: RESOLVED | Noted: 2019-09-04 | Resolved: 2024-10-29

## 2024-10-29 PROCEDURE — G0402 INITIAL PREVENTIVE EXAM: HCPCS | Performed by: INTERNAL MEDICINE

## 2024-10-29 PROCEDURE — G0009 ADMIN PNEUMOCOCCAL VACCINE: HCPCS | Performed by: INTERNAL MEDICINE

## 2024-10-29 PROCEDURE — 90677 PCV20 VACCINE IM: CPT | Performed by: INTERNAL MEDICINE

## 2024-10-29 RX ORDER — DOXYCYCLINE HYCLATE 50 MG/1
50 CAPSULE ORAL DAILY
COMMUNITY
Start: 2024-08-25

## 2024-10-29 NOTE — PROGRESS NOTES
Ambulatory Visit  Name: Dory Pulido      : 1960      MRN: [id number]  Encounter Provider: Kolby Joseph MD  Encounter Date: 10/29/2024   Encounter department: [facility] INTERNAL MEDICINE    Assessment & Plan       Preventive health issues were discussed with patient, and age appropriate screening tests were ordered as noted in patient's After Visit Summary. Personalized health advice and appropriate referrals for health education or preventive services given if needed, as noted in patient's After Visit Summary.    History of Present Illness     HPI   Patient Care Team:  Kolby Joseph MD as PCP - General (Internal Medicine)  Kevin Mohan MD as Endoscopist    Review of Systems  Medical History Reviewed by provider this encounter:       Annual Wellness Visit Questionnaire   Dory is here for her Subsequent Wellness visit.     Health Risk Assessment:   Patient rates overall health as good. Patient feels that their physical health rating is same. Patient is satisfied with their life. Eyesight was rated as same. Hearing was rated as same. Patient feels that their emotional and mental health rating is same. Patients states they are never, rarely angry. Patient states they are sometimes unusually tired/fatigued. Pain experienced in the last 7 days has been none. Patient states that she has experienced no weight loss or gain in last 6 months.     Fall Risk Screening:   In the past year, patient has experienced: no history of falling in past year      Urinary Incontinence Screening:   Patient has not leaked urine accidently in the last six months.     Home Safety:  Patient does not have trouble with stairs inside or outside of their home. Patient has working smoke alarms and has working carbon monoxide detector. Home safety hazards include: none.     Nutrition:   Current diet is Regular and Limited junk food.     Medications:   Patient is not currently taking any over-the-counter supplements.  Patient is able to manage medications.     Activities of Daily Living (ADLs)/Instrumental Activities of Daily Living (IADLs):   Walk and transfer into and out of bed and chair?: Yes  Dress and groom yourself?: Yes    Bathe or shower yourself?: Yes    Feed yourself? Yes  Do your laundry/housekeeping?: Yes  Manage your money, pay your bills and track your expenses?: Yes  Make your own meals?: Yes    Do your own shopping?: Yes    Previous Hospitalizations:   Any hospitalizations or ED visits within the last 12 months?: No      Advance Care Planning:   Living will: No    Durable POA for healthcare: No    Advanced directive: No      PREVENTIVE SCREENINGS      Cardiovascular Screening:    General: Screening Not Indicated and History Lipid Disorder      Diabetes Screening:     General: Screening Current      Colorectal Cancer Screening:     General: Screening Current      Breast Cancer Screening:     General: Screening Current      Cervical Cancer Screening:    General: Screening Not Indicated      Lung Cancer Screening:     General: Screening Not Indicated      Hepatitis C Screening:    General: Screening Current    Screening, Brief Intervention, and Referral to Treatment (SBIRT)    Screening  Typical number of drinks in a day: 0  Typical number of drinks in a week: 1  Interpretation: Low risk drinking behavior.    AUDIT-C Screenin) How often did you have a drink containing alcohol in the past year? 2 to 4 times a month  2) How many drinks did you have on a typical day when you were drinking in the past year? 1 to 2  3) How often did you have 6 or more drinks on one occasion in the past year? never    AUDIT-C Score: 2  Interpretation: Score 0-2 (female): Negative screen for alcohol misuse    Single Item Drug Screening:  How often have you used an illegal drug (including marijuana) or a prescription medication for non-medical reasons in the past year? never    Single Item Drug Screen Score: 0  Interpretation: Negative  screen for possible drug use disorder    Social Determinants of Health     Food Insecurity: No Food Insecurity (10/22/2024)    Hunger Vital Sign     Worried About Running Out of Food in the Last Year: Never true     Ran Out of Food in the Last Year: Never true   Transportation Needs: No Transportation Needs (10/22/2024)    PRAPARE - Transportation     Lack of Transportation (Medical): No     Lack of Transportation (Non-Medical): No   Housing Stability: Low Risk  (10/22/2024)    Housing Stability Vital Sign     Unable to Pay for Housing in the Last Year: No     Number of Times Moved in the Last Year: 0     Homeless in the Last Year: No   Utilities: Not At Risk (10/22/2024)    [facility] Utilities     Threatened with loss of utilities: No     No results found.    Objective     There were no vitals taken for this visit.    Physical Exam

## 2024-10-29 NOTE — PROGRESS NOTES
INTERNAL MEDICINE OFFICE VISIT       NAME: Dory Pulido  AGE: 64 y.o. SEX: female       : 1960        MRN: [id number]    DATE: 10/29/2024  TIME: 10:01 AM    Assessment and Plan   1. Medicare annual wellness visit, initial  2. Thyroid nodule  3. Postmenopause  -     DXA bone density spine hip and pelvis; Future; Expected date: 10/29/2024  4. Mild intermittent asthma without complication  5. Rosacea             Chief Complaint   Medicare wellness visit    History of Present Illness   Dory Pulido is a 64 y.o.-year-old female who presents for a medicare wellness visit. Overall, Dory is doing well, no complaints at this time. Went over causes and side effects of aldactone and doxycycline prescribed by dermatology for acne and rosacea. Pt would like to take them at this time, but will consider stopping if acne and rosacea are less than moderate. 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 Will be prescribing Pro-air, as pt has been using as needed for seasonal allergies, and ordering DEXA scan, considering her age. General surgery is following pt in terms of thyroid ultrasound yearly for thyroid nodule.    Review of Systems   Review of Systems   Constitutional:  Negative for chills, fatigue and fever.   Respiratory:  Negative for cough and shortness of breath.    Cardiovascular:  Negative for chest pain and palpitations.   Gastrointestinal:  Negative for abdominal pain, constipation, diarrhea and vomiting.   Genitourinary:  Negative for dysuria and hematuria.   Musculoskeletal:  Negative for arthralgias and back pain.   Neurological:  Negative for weakness, numbness and headaches.   All other systems reviewed and are negative.      Active Problem List     Patient Active Problem List   Diagnosis    Retinal detachment    Vitamin D deficiency    Patent foramen ovale    Thyroid nodule     Rosacea    Mild intermittent asthma without complication       The following portions of the patient's history were reviewed and updated as appropriate: allergies, current medications, past family history, past medical history, past social history, past surgical history, and problem list.    Objective     Vitals:    10/29/24 0908   BP: 118/78   Pulse: 66   SpO2: 98%     Wt Readings from Last 3 Encounters:   10/29/24 66.6 kg (146 lb 12.8 oz)   08/09/24 65.3 kg (144 lb)   06/17/24 65.3 kg (144 lb)       Physical Exam  Constitutional:       General: She is not in acute distress.     Appearance: She is not ill-appearing.   Eyes:      Pupils: Pupils are equal, round, and reactive to light.   Cardiovascular:      Rate and Rhythm: Normal rate and regular rhythm.      Heart sounds: Normal heart sounds.   Pulmonary:      Effort: No respiratory distress.      Breath sounds: Normal breath sounds. No wheezing or rales.   Abdominal:      General: There is no distension.      Tenderness: There is no abdominal tenderness. There is no guarding.   Musculoskeletal:         General: No swelling.   Skin:     General: Skin is warm and dry.   Neurological:      General: No focal deficit present.      Mental Status: She is oriented to person, place, and time.         Pertinent Laboratory/Diagnostic Studies:  I have reviewed pertinent labs:  Most Recent Labs:   Lab Results   Component Value Date    WBC 5.79 10/09/2024    RBC 4.62 10/09/2024    HGB 13.7 10/09/2024    HCT 44.2 10/09/2024     10/09/2024    RDW 13.5 10/09/2024    NEUTROABS 2.86 10/09/2024    SODIUM 138 10/09/2024    K 4.3 10/09/2024     10/09/2024    CO2 28 10/09/2024    BUN 17 10/09/2024    CREATININE 0.68 10/09/2024    GLUC 97 05/02/2022    GLUF 81 10/09/2024    CALCIUM 9.5 10/09/2024    AST 23 10/09/2024    ALT 17 10/09/2024    ALKPHOS 62 10/09/2024    TP 7.2 10/09/2024    ALB 4.3 10/09/2024    TBILI 0.60 10/09/2024    CHOLESTEROL 203 (H) 10/09/2024    HDL 70  10/09/2024    TRIG 82 10/09/2024    LDLCALC 117 (H) 10/09/2024    NONHDLC 83 03/06/2020    DHE7NNMVOBFI 2.062 10/09/2024    FREET4 0.96 03/23/2022    HGBA1C 5.2 03/16/2023     03/16/2023         Orders Placed This Encounter   Procedures    DXA bone density spine hip and pelvis       ALLERGIES:  Allergies   Allergen Reactions    Azithromycin Other (See Comments)     Pt doesn't remember     Latex      Added based on information entered during case entry, please review and add reactions, type, and severity as needed    Penicillins Rash       Current Medications     Current Outpatient Medications   Medication Sig Dispense Refill    albuterol (ProAir HFA) 90 mcg/act inhaler Inhale 2 puffs every 6 (six) hours as needed for wheezing or shortness of breath 8.5 g 3    aspirin 81 mg chewable tablet Chew 1 tablet (81 mg total) daily  0    cholecalciferol (VITAMIN D3) 1,000 units tablet Take 1,000 Units by mouth daily      doxycycline (ADOXA) 50 MG tablet Take 50 mg by mouth 2 (two) times a day      doxycycline hyclate (VIBRAMYCIN) 50 mg capsule Take 50 mg by mouth daily Take with food      estradiol (ESTRACE VAGINAL) 0.1 mg/g vaginal cream Apply 1/4  of an applicator intravaginally 1-2 times weekly 42.5 g 3    spironolactone (ALDACTONE) 50 mg tablet Take 2 tabs bid daily       Current Facility-Administered Medications   Medication Dose Route Frequency Provider Last Rate Last Admin    cyclobenzaprine (FLEXERIL) tablet 10 mg  10 mg Oral TID PRN Toby Gaines PA-C             Health Maintenance        Blanca Bernabe DO      Answers submitted by the patient for this visit:  Medicare Annual Wellness Visit (Submitted on 10/22/2024)  How would you rate your overall health?: good  Compared to last year, how is your physical health?: same  In general, how satisfied are you with your life?: satisfied  Compared to last year, how is your eyesight?: same  Compared to last year, how is your hearing?: same  Compared to last year, how is  your emotional/mental health?: same  How often is anger a problem for you?: never, rarely  How often do you feel unusually tired/fatigued?: sometimes  In the past 7 days, how much pain have you experienced?: none  In the past 6 months, have you lost or gained 10 pounds without trying?: No  One or more falls in the last year: No  In the past 6 months, have you accidentally leaked urine?: No  Do you have trouble with the stairs inside or outside your home?: No  Does your home have working smoke alarms?: Yes  Does your home have a carbon monoxide monitor?: Yes  Which safety hazards (if any) have you experienced in your home? Please select all that apply.: none  How would you describe your current diet? Please select all that apply.: Regular, Limited junk food  In addition to prescription medications, are you taking any over-the-counter supplements?: No  Can you manage your medications?: Yes  Are you currently taking any opioid medications?: No  Can you walk and transfer into and out of your bed and chair?: Yes  Can you dress and groom yourself?: Yes  Can you bathe or shower yourself?: Yes  Can you feed yourself?: Yes  Can you do your laundry/ housekeeping?: Yes  Can you manage your money, pay your bills, and track your expenses?: Yes  Can you make your own meals?: Yes  Can you do your own shopping?: Yes  Within the last 12 months, have you had any hospitalizations or Emergency Department visits?: No  Do you have a living will?: No  Do you have a Durable POA (Power of ) for healthcare decisions?: No  Do you have an Advanced Directive for end of life decisions?: No  How often have you used an illegal drug (including marijuana) or a prescription medication for non-medical reasons in the past year?: never  What is the typical number of drinks you consume in a day?: 0  What is the typical number of drinks you consume in a week?: 1  How often did you have a drink containing alcohol in the past year?: 2 to 4 times a  month  How many drinks did you have on a typical day  when you were drinking in the past year?: 1 to 2  How often did you have 6 or more drinks on one occasion in the past year?: never

## 2024-11-20 ENCOUNTER — HOSPITAL ENCOUNTER (OUTPATIENT)
Dept: RADIOLOGY | Facility: MEDICAL CENTER | Age: 64
Discharge: HOME | End: 2024-11-20
Payer: MEDICARE

## 2024-11-20 DIAGNOSIS — Z78.0 POSTMENOPAUSE: ICD-10-CM

## 2024-11-20 PROCEDURE — 77080 DXA BONE DENSITY AXIAL: CPT

## 2024-11-21 ENCOUNTER — RESULTS FOLLOW-UP (OUTPATIENT)
Age: 64
End: 2024-11-21

## 2024-11-21 PROBLEM — M85.89 OSTEOPENIA OF MULTIPLE SITES: Status: ACTIVE | Noted: 2017-10-23

## 2024-11-21 NOTE — RESULT ENCOUNTER NOTE
Hi Asmita,  Your DEXA scan results shows that you do not have osteoporosis.  You do have low bone mass.  This does not require medication treatment.  Maintaining regular exercise and adequate amounts of calcium and vitamin D should help prevent bone loss.  Please continue to take your vitamin D supplement.    We will recheck your DEXA scan again in 2 years.    - Dr Kolby Joseph

## 2024-12-30 ENCOUNTER — HOSPITAL ENCOUNTER (OUTPATIENT)
Dept: RADIOLOGY | Facility: MEDICAL CENTER | Age: 64
Discharge: HOME | End: 2024-12-30
Payer: MEDICARE

## 2024-12-30 DIAGNOSIS — E04.1 THYROID NODULE: ICD-10-CM

## 2024-12-30 PROCEDURE — 76536 US EXAM OF HEAD AND NECK: CPT

## 2025-04-21 ENCOUNTER — ESTABLISHED COMPREHENSIVE EXAM (OUTPATIENT)
Dept: URBAN - METROPOLITAN AREA CLINIC 6 | Facility: CLINIC | Age: 65
End: 2025-04-21

## 2025-04-21 DIAGNOSIS — H35.372: ICD-10-CM

## 2025-04-21 DIAGNOSIS — H25.813: ICD-10-CM

## 2025-04-21 DIAGNOSIS — H33.312: ICD-10-CM

## 2025-04-21 PROCEDURE — 92134 CPTRZ OPH DX IMG PST SGM RTA: CPT

## 2025-04-21 PROCEDURE — 92014 COMPRE OPH EXAM EST PT 1/>: CPT

## 2025-04-21 PROCEDURE — 92015 DETERMINE REFRACTIVE STATE: CPT

## 2025-04-21 ASSESSMENT — TONOMETRY
OS_IOP_MMHG: 19
OD_IOP_MMHG: 18

## 2025-04-21 ASSESSMENT — VISUAL ACUITY
OS_SC: 20/40
OD_PH: 20/30
OS_PH: 20/30
OD_SC: 20/80-1

## 2025-06-02 ENCOUNTER — TELEPHONE (OUTPATIENT)
Dept: SURGERY | Facility: CLINIC | Age: 65
End: 2025-06-02

## 2025-08-15 ENCOUNTER — ANNUAL EXAM (OUTPATIENT)
Dept: OBGYN CLINIC | Facility: CLINIC | Age: 65
End: 2025-08-15
Payer: MEDICARE

## (undated) DEVICE — UNDER BUTTOCKS DRAPE W/FLUID CONTROL POUCH: Brand: CONVERTORS

## (undated) DEVICE — ADHESIVE SKIN HIGH VISCOSITY EXOFIN 1ML

## (undated) DEVICE — CAUTERY TIP POLISHER: Brand: DEVON

## (undated) DEVICE — CATH FOLEY 18FR 5ML 2 WAY UNCOATED SILICONE

## (undated) DEVICE — IV FLUSH NSS 10ML POSIFLUSH

## (undated) DEVICE — SCD SEQUENTIAL COMPRESSION COMFORT SLEEVE MEDIUM KNEE LENGTH: Brand: KENDALL SCD

## (undated) DEVICE — EXIDINE 4 PCT

## (undated) DEVICE — NEEDLE HYPO 22G X 1-1/2 IN

## (undated) DEVICE — MEDI-VAC YANKAUER SUCTION HANDLE W/BULBOUS AND CONTROL VENT: Brand: CARDINAL HEALTH

## (undated) DEVICE — TUBING SUCTION 5MM X 12 FT

## (undated) DEVICE — GLOVE PI ULTRA TOUCH SZ.8.0

## (undated) DEVICE — 2000CC GUARDIAN II: Brand: GUARDIAN

## (undated) DEVICE — ELECTROSURGICAL DEVICE HOLSTER;FOR USE WITH MAXIMUM PEAK VOLTAGE OF 4000 V: Brand: FORCE TRIVERSE

## (undated) DEVICE — VIAL DECANTER

## (undated) DEVICE — SUT VICRYL 2-0 SH 27 IN UNDYED J417H

## (undated) DEVICE — PREMIUM DRY TRAY LF: Brand: MEDLINE INDUSTRIES, INC.

## (undated) DEVICE — INTENDED FOR TISSUE SEPARATION, AND OTHER PROCEDURES THAT REQUIRE A SHARP SURGICAL BLADE TO PUNCTURE OR CUT.: Brand: BARD-PARKER SAFETY BLADES SIZE 11, STERILE

## (undated) DEVICE — BULB SYRINGE,IRRIGATION WITH PROTECTIVE CAP: Brand: DOVER

## (undated) DEVICE — SMOKE EVACUATION TUBING WITH 8 IN INTEGRAL WAND AND SPONGE GUARD: Brand: BUFFALO FILTER

## (undated) DEVICE — ALLENTOWN DR  LUCENTE S LAP PK: Brand: CARDINAL HEALTH